# Patient Record
Sex: FEMALE | Race: BLACK OR AFRICAN AMERICAN | Employment: OTHER | URBAN - METROPOLITAN AREA
[De-identification: names, ages, dates, MRNs, and addresses within clinical notes are randomized per-mention and may not be internally consistent; named-entity substitution may affect disease eponyms.]

---

## 2017-02-14 ENCOUNTER — HOSPITAL ENCOUNTER (OUTPATIENT)
Dept: ULTRASOUND IMAGING | Age: 62
Discharge: HOME | End: 2017-02-14
Attending: NURSE PRACTITIONER
Payer: COMMERCIAL

## 2017-02-14 DIAGNOSIS — M79.606 PAIN AND SWELLING OF LOWER EXTREMITY: ICD-10-CM

## 2017-02-14 DIAGNOSIS — M79.89 PAIN AND SWELLING OF LOWER EXTREMITY: ICD-10-CM

## 2017-02-14 PROCEDURE — 93970 EXTREMITY STUDY: CPT

## 2017-08-23 PROBLEM — Z13.1 SCREENING FOR DIABETES MELLITUS (DM): Status: ACTIVE | Noted: 2017-08-23

## 2017-08-23 PROBLEM — E87.6 HYPOKALEMIA: Status: ACTIVE | Noted: 2017-08-23

## 2017-08-23 PROBLEM — E55.9 VITAMIN D DEFICIENCY: Status: ACTIVE | Noted: 2017-08-23

## 2017-08-23 PROBLEM — I10 HTN (HYPERTENSION): Status: ACTIVE | Noted: 2017-08-23

## 2017-08-23 PROBLEM — E04.1 LEFT THYROID NODULE: Status: ACTIVE | Noted: 2017-08-23

## 2017-08-23 PROBLEM — Z13.29 SCREENING FOR HYPOTHYROIDISM: Status: ACTIVE | Noted: 2017-08-23

## 2017-08-23 PROBLEM — Z00.00 ANNUAL PHYSICAL EXAM: Status: ACTIVE | Noted: 2017-08-23

## 2017-08-23 PROBLEM — E66.01 MORBID OBESITY (HCC): Status: ACTIVE | Noted: 2017-08-23

## 2017-08-23 PROBLEM — Z71.3 DIETARY COUNSELING: Status: ACTIVE | Noted: 2017-08-23

## 2017-08-23 PROBLEM — R10.30 LOWER ABDOMINAL PAIN: Status: ACTIVE | Noted: 2017-08-23

## 2017-08-23 PROBLEM — R21 RASH: Status: ACTIVE | Noted: 2017-08-23

## 2017-08-23 PROBLEM — M17.9 DJD (DEGENERATIVE JOINT DISEASE) OF KNEE: Status: ACTIVE | Noted: 2017-08-23

## 2017-08-23 PROBLEM — M51.9 LUMBAR DISC DISEASE: Status: ACTIVE | Noted: 2017-08-23

## 2017-08-23 PROBLEM — J30.9 RHINITIS, ALLERGIC: Status: ACTIVE | Noted: 2017-08-23

## 2017-08-23 PROBLEM — B35.4 TINEA CORPORIS: Status: ACTIVE | Noted: 2017-08-23

## 2017-08-23 PROBLEM — M79.89 SWELLING OF BOTH LOWER EXTREMITIES: Status: ACTIVE | Noted: 2017-08-23

## 2017-08-23 PROBLEM — Z78.0 MENOPAUSE: Status: ACTIVE | Noted: 2017-08-23

## 2017-08-23 PROBLEM — E66.9 OBESITY (BMI 30-39.9): Status: ACTIVE | Noted: 2017-08-23

## 2017-08-23 PROBLEM — L25.0 CONTACT DERMATITIS DUE TO COSMETICS: Status: ACTIVE | Noted: 2017-08-23

## 2017-08-23 PROBLEM — R10.9 ABDOMINAL PAIN: Status: ACTIVE | Noted: 2017-08-23

## 2017-08-23 PROBLEM — E78.5 HYPERLIPIDEMIA: Status: ACTIVE | Noted: 2017-08-23

## 2017-08-23 PROBLEM — K21.9 GASTROESOPHAGEAL REFLUX DISEASE WITH HIATAL HERNIA: Status: ACTIVE | Noted: 2017-08-23

## 2017-08-23 PROBLEM — K80.20 GALLSTONES: Status: ACTIVE | Noted: 2017-08-23

## 2017-08-23 PROBLEM — Z92.89 HISTORY OF TRANSFUSION: Status: ACTIVE | Noted: 2017-08-23

## 2017-08-23 PROBLEM — M19.90 DEGENERATIVE JOINT DISEASE: Status: ACTIVE | Noted: 2017-08-23

## 2017-08-23 PROBLEM — R05.9 COUGH: Status: ACTIVE | Noted: 2017-08-23

## 2017-08-23 PROBLEM — R51.9 HEADACHE: Status: ACTIVE | Noted: 2017-08-23

## 2017-08-23 PROBLEM — R32 URINARY INCONTINENCE: Status: ACTIVE | Noted: 2017-08-23

## 2017-08-23 PROBLEM — M79.89 LEG SWELLING: Status: ACTIVE | Noted: 2017-08-23

## 2017-08-23 PROBLEM — K44.9 GASTROESOPHAGEAL REFLUX DISEASE WITH HIATAL HERNIA: Status: ACTIVE | Noted: 2017-08-23

## 2017-08-23 PROBLEM — M25.551 HIP PAIN, RIGHT: Status: ACTIVE | Noted: 2017-08-23

## 2017-08-23 RX ORDER — TOLTERODINE 4 MG/1
4 CAPSULE, EXTENDED RELEASE ORAL DAILY
COMMUNITY
End: 2018-10-22 | Stop reason: ALTCHOICE

## 2017-08-23 RX ORDER — HYDROXYZINE PAMOATE 25 MG/1
25 CAPSULE ORAL
COMMUNITY
End: 2018-02-08

## 2017-08-23 RX ORDER — CLOTRIMAZOLE AND BETAMETHASONE DIPROPIONATE 10; .64 MG/G; MG/G
CREAM TOPICAL
COMMUNITY
End: 2018-02-08

## 2017-08-23 RX ORDER — RABEPRAZOLE SODIUM 20 MG/1
20 TABLET, DELAYED RELEASE ORAL DAILY
COMMUNITY
End: 2017-12-28

## 2017-09-27 ENCOUNTER — OFFICE VISIT (OUTPATIENT)
Dept: INTERNAL MEDICINE CLINIC | Age: 62
End: 2017-09-27

## 2017-09-27 VITALS
HEIGHT: 66 IN | TEMPERATURE: 99 F | DIASTOLIC BLOOD PRESSURE: 89 MMHG | WEIGHT: 293 LBS | OXYGEN SATURATION: 98 % | SYSTOLIC BLOOD PRESSURE: 136 MMHG | HEART RATE: 82 BPM | BODY MASS INDEX: 47.09 KG/M2

## 2017-09-27 DIAGNOSIS — E66.01 MORBID OBESITY DUE TO EXCESS CALORIES (HCC): ICD-10-CM

## 2017-09-27 DIAGNOSIS — I10 ESSENTIAL HYPERTENSION: Primary | ICD-10-CM

## 2017-09-27 DIAGNOSIS — K80.20 GALLSTONES: ICD-10-CM

## 2017-09-27 DIAGNOSIS — K44.9 GASTROESOPHAGEAL REFLUX DISEASE WITH HIATAL HERNIA: ICD-10-CM

## 2017-09-27 DIAGNOSIS — K21.9 GASTROESOPHAGEAL REFLUX DISEASE WITH HIATAL HERNIA: ICD-10-CM

## 2017-09-27 DIAGNOSIS — E78.2 MIXED HYPERLIPIDEMIA: ICD-10-CM

## 2017-09-27 RX ORDER — FLUCONAZOLE 150 MG/1
150 TABLET ORAL
COMMUNITY
End: 2018-02-08

## 2017-09-27 NOTE — PROGRESS NOTES
Subjective:  Ms. Ryanne Laws is a pleasant 60year old lady, who comes in today for follow up of her medical problems. She has no new complaints. 1. Hypertension, currently managed on Lisinopril/HCTZ 12/12.5 mg daily. She denies any headaches, dizziness or blurred vision. She denies any chest pain or palpitation. She denies any shortness of breath, cough, wheezing, PND or orthopnea. She does have chronic ankle edema. 2. GERD, managed on Nexium 40 mg daily. She denies any recent nausea or vomiting and her appetite remains good. Her weight has persistently gone up in the last year. She did have a normal colonoscopy in 2014. 3. Hyperlipidemia, managed on Simvastatin. She denies any joint pain or muscle ache. 4. Urinary incontinence, for which she sees Dr. Ananda William at [facility]. Dr. Ananda William added some Myrbetriq, which she is taking along with her Detrol LA. It has helped minimally. She is to follow up with Dr. Ananda William in one month. 5. Cholelithiasis. Further discussion with her revealed she has no recollection that she saw Dr. Caro Iverson in May of 2016, at which time Dr. Becki Blake did discuss doing a DaVinci cholecystectomy. I printed Dr. Anne Heart note and went over the visit with her. She has been asymptomatic. Physical Examination:  GENERAL:  On exam she is a pleasant, markedly obese lady in no acute distress. She is alert and oriented. VITALS:  BP: 136/89. P: 82.  R: 16.  T: 99.  O2 sat: 98.  WT: currently 392 lbs. She is 5'6\". NECK:  Supple without adenopathy or carotid bruits. CHEST:  Lungs were clear to auscultation, no rales or wheezes. CARDIAC:  Heart regular rhythm without murmur. EXTREMITIES:  Trace pitting edema in both ankles. Impression:  1. Hypertension, stable. 2. Hyperlipidemia. 3. Morbid obesity. 4. GERD. 5. Urinary incontinence. 6. Cholelithiasis. Plan:  1. She will continue with her current regimen and I will see her every three months.   I reviewed her lab work done at her last visit, which was entirely normal.  2. I did encourage her to follow up with Dr. Ash Rodriguez in regards to cholelithiasis.

## 2017-09-27 NOTE — PROGRESS NOTES
Robert Edwards presents with   Chief Complaint   Patient presents with    Follow-up     Patient here for a 3 month followup. She is fasting. No new complaints. 1. Have you been to the ER, urgent care clinic since your last visit? Hospitalized since your last visit? No    2. Have you seen or consulted any other health care providers outside of the [address] [place] [address] since your last visit? Include any pap smears or colon screening.  No

## 2017-09-27 NOTE — MR AVS SNAPSHOT
Visit Information Date & Time Provider Department Dept. Phone Encounter #  
 9/27/2017 10:30 AM Catrachito Triplett NP [facility] [phone] [id number] Follow-up Instructions Return in about 3 months (around 12/27/2017). Upcoming Health Maintenance Date Due Hepatitis C Screening 1955 DTaP/Tdap/Td series (1 - Tdap) 3/14/1975 PAP AKA CERVICAL CYTOLOGY 3/14/1975 FOBT Q 1 YEAR AGE 50-75 3/14/2004 ZOSTER VACCINE AGE 60> 1/14/2014 BREAST CANCER SCRN MAMMOGRAM 5/22/2015 INFLUENZA AGE 9 TO ADULT 8/1/2017 Allergies as of 9/27/2017  Review Complete On: 9/27/2017 By: Ana Parrish Severity Noted Reaction Type Reactions Adhesive Tape-silicones  59/78/6325    Unknown (comments) Current Immunizations  Never Reviewed Name Date Influenza Vaccine 11/14/2016 Influenza Vaccine Split 10/31/2011 11:27 AM  
  
 Not reviewed this visit You Were Diagnosed With   
  
 Codes Comments Essential hypertension    -  Primary ICD-10-CM: I10 
ICD-9-CM: 401.9 Mixed hyperlipidemia     ICD-10-CM: E78.2 ICD-9-CM: 272.2 Morbid obesity due to excess calories (HCC)     ICD-10-CM: E66.01 
ICD-9-CM: 278.01 Gallstones     ICD-10-CM: K80.20 ICD-9-CM: 574.20 Gastroesophageal reflux disease with hiatal hernia     ICD-10-CM: K21.9, K44.9 ICD-9-CM: 530.81, 553.3 Vitals BP Pulse Temp Height(growth percentile) Weight(growth percentile) SpO2  
 136/89 (BP 1 Location: Left arm, BP Patient Position: Sitting) 82 99 °F (37.2 °C) (Oral) 5' 6\" (1.676 m) (!) 392 lb (177.8 kg) 98% BMI OB Status Smoking Status 63.27 kg/m2 Postmenopausal Never Smoker BMI and BSA Data Body Mass Index Body Surface Area  
 63.27 kg/m 2 2.88 m 2 Preferred Pharmacy Pharmacy Name Phone [id number] [address] [place] [phone] Your Updated Medication List  
  
   
This list is accurate as of: 9/27/17 11:25 AM.  Always use your most recent med list.  
  
  
  
  
 BIOTIN PO Take  by mouth. diclofenac EC 75 mg EC tablet Commonly known as:  VOLTAREN Take 75 mg by mouth three (3) times daily. FISH OIL 1,000 mg Cap Generic drug:  omega-3 fatty acids-vitamin e Take 1 Cap by mouth daily. fluconazole 150 mg tablet Commonly known as:  DIFLUCAN Take 150 mg by mouth every thirty (30) days. FDA advises cautious prescribing of oral fluconazole in pregnancy. hydroCHLOROthiazide 12.5 mg capsule Commonly known as:  Salazar Priscila Take 12.5 mg by mouth daily. lisinopril 10 mg tablet Commonly known as:  Hepler Faustino Take 20 mg by mouth daily. LOTRISONE topical cream  
Generic drug:  clotrimazole-betamethasone Apply  to affected area two (2) times a day. MYRBETRIQ 25 mg ER tablet Generic drug:  mirabegron ER Take 25 mg by mouth daily. NexIUM 40 mg capsule Generic drug:  esomeprazole Take  by mouth daily. potassium chloride SA 10 mEq capsule Commonly known as:  Valentino China Take 20 mEq by mouth daily. RABEprazole 20 mg tablet Commonly known as:  ACIPHEX Take 20 mg by mouth daily. simvastatin 20 mg tablet Commonly known as:  ZOCOR Take  by mouth nightly. solifenacin 5 mg tablet Commonly known as:  Leta [place] Take 5 mg by mouth daily. tolterodine ER 4 mg ER capsule Commonly known as:  Emory Jimenez Take 4 mg by mouth daily. traMADol 50 mg tablet Commonly known as:  ULTRAM  
Take 1 Tab by mouth every six (6) hours as needed for Pain. Max Daily Amount: 200 mg. VISTARIL 25 mg capsule Generic drug:  hydrOXYzine pamoate Take 25 mg by mouth three (3) times daily as needed for Itching. VITAMIN D2 PO Take  by mouth. Follow-up Instructions Return in about 3 months (around 12/27/2017). Patient Instructions DASH Diet: Care Instructions Your Care Instructions The DASH diet is an eating plan that can help lower your blood pressure. DASH stands for Dietary Approaches to Stop Hypertension. Hypertension is high blood pressure. The DASH diet focuses on eating foods that are high in calcium, potassium, and magnesium. These nutrients can lower blood pressure. The foods that are highest in these nutrients are fruits, vegetables, low-fat dairy products, nuts, seeds, and legumes. But taking calcium, potassium, and magnesium supplements instead of eating foods that are high in those nutrients does not have the same effect. The DASH diet also includes whole grains, fish, and poultry. The DASH diet is one of several lifestyle changes your doctor may recommend to lower your high blood pressure. Your doctor may also want you to decrease the amount of sodium in your diet. Lowering sodium while following the DASH diet can lower blood pressure even further than just the DASH diet alone. Follow-up care is a key part of your treatment and safety. Be sure to make and go to all appointments, and call your doctor if you are having problems. It's also a good idea to know your test results and keep a list of the medicines you take. How can you care for yourself at home? Following the DASH diet · Eat 4 to 5 servings of fruit each day. A serving is 1 medium-sized piece of fruit, ½ cup chopped or canned fruit, 1/4 cup dried fruit, or 4 ounces (½ cup) of fruit juice. Choose fruit more often than fruit juice. · Eat 4 to 5 servings of vegetables each day. A serving is 1 cup of lettuce or raw leafy vegetables, ½ cup of chopped or cooked vegetables, or 4 ounces (½ cup) of vegetable juice. Choose vegetables more often than vegetable juice. · Get 2 to 3 servings of low-fat and fat-free dairy each day. A serving is 8 ounces of milk, 1 cup of yogurt, or 1 ½ ounces of cheese. · Eat 6 to 8 servings of grains each day. A serving is 1 slice of bread, 1 ounce of dry cereal, or ½ cup of cooked rice, pasta, or cooked cereal. Try to choose whole-grain products as much as possible. · Limit lean meat, poultry, and fish to 2 servings each day. A serving is 3 ounces, about the size of a deck of cards. · Eat 4 to 5 servings of nuts, seeds, and legumes (cooked dried beans, lentils, and split peas) each week. A serving is 1/3 cup of nuts, 2 tablespoons of seeds, or ½ cup of cooked beans or peas. · Limit fats and oils to 2 to 3 servings each day. A serving is 1 teaspoon of vegetable oil or 2 tablespoons of salad dressing. · Limit sweets and added sugars to 5 servings or less a week. A serving is 1 tablespoon jelly or jam, ½ cup sorbet, or 1 cup of lemonade. · Eat less than 2,300 milligrams (mg) of sodium a day. If you limit your sodium to 1,500 mg a day, you can lower your blood pressure even more. Tips for success · Start small. Do not try to make dramatic changes to your diet all at once. You might feel that you are missing out on your favorite foods and then be more likely to not follow the plan. Make small changes, and stick with them. Once those changes become habit, add a few more changes. · Try some of the following: ¨ Make it a goal to eat a fruit or vegetable at every meal and at snacks. This will make it easy to get the recommended amount of fruits and vegetables each day. ¨ Try yogurt topped with fruit and nuts for a snack or healthy dessert. ¨ Add lettuce, tomato, cucumber, and onion to sandwiches. ¨ Combine a ready-made pizza crust with low-fat mozzarella cheese and lots of vegetable toppings. Try using tomatoes, squash, spinach, broccoli, carrots, cauliflower, and onions. ¨ Have a variety of cut-up vegetables with a low-fat dip as an appetizer instead of chips and dip. ¨ Sprinkle sunflower seeds or chopped almonds over salads.  Or try adding chopped walnuts or almonds to cooked vegetables. ¨ Try some vegetarian meals using beans and peas. Add garbanzo or kidney beans to salads. Make burritos and tacos with mashed santillan beans or black beans. Where can you learn more? Go to http://jean pierre-shanae.info/. Enter T406 in the search box to learn more about \"DASH Diet: Care Instructions. \" Current as of: April 3, 2017 Content Version: 11.3 © 5277-8002 [employer]. Care instructions adapted under license by [employer] (which disclaims liability or warranty for this information). If you have questions about a medical condition or this instruction, always ask your healthcare professional. [address] any warranty or liability for your use of this information. Introducing [facility]! Brandyn Garcia introduces [employer] patient portal. Now you can access parts of your medical record, email your doctor's office, and request medication refills online. 1. In your internet browser, go to [email or web address] 2. Click on the First Time User? Click Here link in the Sign In box. You will see the New Member Sign Up page. 3. Enter your [employer] Access Code exactly as it appears below. You will not need to use this code after youve completed the sign-up process. If you do not sign up before the expiration date, you must request a new code. · [employer] Access Code: [email or web address] Expires: 11/18/2017  9:14 AM 
 
4. Enter the last four digits of your Social Security Number (xxxx) and Date of Birth (mm/dd/yyyy) as indicated and click Submit. You will be taken to the next sign-up page. 5. Create a [employer] ID. This will be your [employer] login ID and cannot be changed, so think of one that is secure and easy to remember. 6. Create a [employer] password. You can change your password at any time. 7. Enter your Password Reset Question and Answer.  This can be used at a later time if you forget your password. 8. Enter your e-mail address. You will receive e-mail notification when new information is available in [address] 9. Click Sign Up. You can now view and download portions of your medical record. 10. Click the Download Summary menu link to download a portable copy of your medical information. If you have questions, please visit the Frequently Asked Questions section of the [employer] website. Remember, [employer] is NOT to be used for urgent needs. For medical emergencies, dial 911. Now available from your iPhone and Android! Please provide this summary of care documentation to your next provider. Your primary care clinician is listed as Bisi Caruso. If you have any questions after today's visit, please call [phone].

## 2017-09-27 NOTE — PATIENT INSTRUCTIONS

## 2017-12-28 ENCOUNTER — OFFICE VISIT (OUTPATIENT)
Dept: INTERNAL MEDICINE CLINIC | Age: 62
End: 2017-12-28

## 2017-12-28 ENCOUNTER — HOSPITAL ENCOUNTER (OUTPATIENT)
Dept: CT IMAGING | Age: 62
Discharge: HOME | End: 2017-12-28
Attending: NURSE PRACTITIONER
Payer: OTHER GOVERNMENT

## 2017-12-28 VITALS
WEIGHT: 293 LBS | DIASTOLIC BLOOD PRESSURE: 86 MMHG | SYSTOLIC BLOOD PRESSURE: 130 MMHG | OXYGEN SATURATION: 99 % | BODY MASS INDEX: 47.09 KG/M2 | HEART RATE: 90 BPM | HEIGHT: 66 IN

## 2017-12-28 DIAGNOSIS — Z11.59 NEED FOR HEPATITIS C SCREENING TEST: ICD-10-CM

## 2017-12-28 DIAGNOSIS — R10.31 RIGHT LOWER QUADRANT ABDOMINAL PAIN: ICD-10-CM

## 2017-12-28 DIAGNOSIS — I10 ESSENTIAL HYPERTENSION: Primary | ICD-10-CM

## 2017-12-28 DIAGNOSIS — E66.01 OBESITY, CLASS III, BMI 40-49.9 (MORBID OBESITY) (HCC): ICD-10-CM

## 2017-12-28 DIAGNOSIS — Z23 ENCOUNTER FOR IMMUNIZATION: ICD-10-CM

## 2017-12-28 DIAGNOSIS — R10.31 RIGHT LOWER QUADRANT ABDOMINAL PAIN: Primary | ICD-10-CM

## 2017-12-28 LAB
ALBUMIN SERPL-MCNC: 4.1 G/DL (ref 3.9–5.4)
ALKALINE PHOS POC: 94 U/L (ref 38–126)
ALT SERPL-CCNC: 18 U/L (ref 9–52)
AST SERPL-CCNC: 14 U/L (ref 14–36)
BACTERIA UA POCT, BACTPOCT: NORMAL
BILIRUB UR QL STRIP: NEGATIVE
BUN BLD-MCNC: 14 MG/DL (ref 7–17)
CALCIUM BLD-MCNC: 9.6 MG/DL (ref 8.4–10.2)
CASTS UA POCT: NORMAL
CHLORIDE BLD-SCNC: 103 MMOL/L (ref 98–107)
CLUE CELLS, CLUEPOCT: NEGATIVE
CO2 POC: 30 MMOL/L (ref 22–32)
CREAT BLD-MCNC: 0.6 MG/DL (ref 0.6–1.3)
CREAT BLD-MCNC: 0.6 MG/DL (ref 0.7–1.2)
CRYSTALS UA POCT, CRYSPOCT: NEGATIVE
EGFR (POC): 97
EPITHELIAL CELLS POCT: NORMAL
GLUCOSE POC: 87 MG/DL (ref 65–105)
GLUCOSE UR-MCNC: NEGATIVE MG/DL
GRAN# POC: 3.4 K/UL (ref 2–7.8)
GRAN% POC: 71.7 % (ref 37–92)
HCT VFR BLD CALC: 34 % (ref 37–51)
HGB BLD-MCNC: 11.1 G/DL (ref 12–18)
KETONES P FAST UR STRIP-MCNC: NEGATIVE MG/DL
LY# POC: 1.1 K/UL (ref 0.6–4.1)
LY% POC: 24 % (ref 10–58.5)
MCH RBC QN: 27.9 PG (ref 26–32)
MCHC RBC-ENTMCNC: 32.7 G/DL (ref 30–36)
MCV RBC: 85 FL (ref 80–97)
MID #, POC: 0.1 K/UL (ref 0–1.8)
MID% POC: 4.3 % (ref 0.1–24)
MUCUS UA POCT, MUCPOCT: NORMAL
PH UR STRIP: 6 [PH] (ref 5–7)
PLATELET # BLD: 226 K/UL (ref 140–440)
POTASSIUM SERPL-SCNC: 4.1 MMOL/L (ref 3.6–5)
PROT SERPL-MCNC: 7.4 G/DL (ref 6.3–8.2)
PROT UR QL STRIP: NEGATIVE
RBC # BLD: 3.98 M/UL (ref 4.2–6.3)
RBC UA POCT, RBCPOCT: NORMAL
SODIUM SERPL-SCNC: 144 MMOL/L (ref 137–145)
SP GR UR STRIP: 1.02 (ref 1.01–1.02)
TOTAL BILIRUBIN POC: 0.8 MG/DL (ref 0.2–1.3)
TRICH UA POCT, TRICHPOC: NEGATIVE
UA UROBILINOGEN AMB POC: NORMAL (ref 0.2–1)
URINALYSIS CLARITY POC: CLEAR
URINALYSIS COLOR POC: NORMAL
URINE BLOOD POC: NEGATIVE
URINE CULT COMMENT, POCT: NORMAL
URINE LEUKOCYTES POC: NORMAL
URINE NITRITES POC: NEGATIVE
WBC # BLD: 4.6 K/UL (ref 4.1–10.9)
WBC UA POCT, WBCPOCT: NORMAL
YEAST UA POCT, YEASTPOC: NEGATIVE

## 2017-12-28 PROCEDURE — 74011250636 HC RX REV CODE- 250/636: Performed by: NURSE PRACTITIONER

## 2017-12-28 PROCEDURE — 74177 CT ABD & PELVIS W/CONTRAST: CPT

## 2017-12-28 PROCEDURE — 82565 ASSAY OF CREATININE: CPT

## 2017-12-28 PROCEDURE — 74011000255 HC RX REV CODE- 255: Performed by: NURSE PRACTITIONER

## 2017-12-28 PROCEDURE — 74011636320 HC RX REV CODE- 636/320: Performed by: NURSE PRACTITIONER

## 2017-12-28 RX ORDER — BARIUM SULFATE 20 MG/ML
900 SUSPENSION ORAL
Status: COMPLETED | OUTPATIENT
Start: 2017-12-28 | End: 2017-12-28

## 2017-12-28 RX ORDER — SODIUM CHLORIDE 0.9 % (FLUSH) 0.9 %
10 SYRINGE (ML) INJECTION
Status: COMPLETED | OUTPATIENT
Start: 2017-12-28 | End: 2017-12-28

## 2017-12-28 RX ORDER — SODIUM CHLORIDE 9 MG/ML
50 INJECTION, SOLUTION INTRAVENOUS
Status: COMPLETED | OUTPATIENT
Start: 2017-12-28 | End: 2017-12-28

## 2017-12-28 RX ADMIN — Medication 10 ML: at 15:07

## 2017-12-28 RX ADMIN — BARIUM SULFATE 900 ML: 21 SUSPENSION ORAL at 15:07

## 2017-12-28 RX ADMIN — SODIUM CHLORIDE 50 ML/HR: 900 INJECTION, SOLUTION INTRAVENOUS at 15:07

## 2017-12-28 RX ADMIN — IOPAMIDOL 100 ML: 755 INJECTION, SOLUTION INTRAVENOUS at 15:07

## 2017-12-28 NOTE — PATIENT INSTRUCTIONS
Abdominal Pain: Care Instructions  Your Care Instructions    Abdominal pain has many possible causes. Some aren't serious and get better on their own in a few days. Others need more testing and treatment. If your pain continues or gets worse, you need to be rechecked and may need more tests to find out what is wrong. You may need surgery to correct the problem. Don't ignore new symptoms, such as fever, nausea and vomiting, urination problems, pain that gets worse, and dizziness. These may be signs of a more serious problem. Your doctor may have recommended a follow-up visit in the next 8 to 12 hours. If you are not getting better, you may need more tests or treatment. The doctor has checked you carefully, but problems can develop later. If you notice any problems or new symptoms, get medical treatment right away. Follow-up care is a key part of your treatment and safety. Be sure to make and go to all appointments, and call your doctor if you are having problems. It's also a good idea to know your test results and keep a list of the medicines you take. How can you care for yourself at home? · Rest until you feel better. · To prevent dehydration, drink plenty of fluids, enough so that your urine is light yellow or clear like water. Choose water and other caffeine-free clear liquids until you feel better. If you have kidney, heart, or liver disease and have to limit fluids, talk with your doctor before you increase the amount of fluids you drink. · If your stomach is upset, eat mild foods, such as rice, dry toast or crackers, bananas, and applesauce. Try eating several small meals instead of two or three large ones. · Wait until 48 hours after all symptoms have gone away before you have spicy foods, alcohol, and drinks that contain caffeine. · Do not eat foods that are high in fat. · Avoid anti-inflammatory medicines such as aspirin, ibuprofen (Advil, Motrin), and naproxen (Aleve).  These can cause stomach upset. Talk to your doctor if you take daily aspirin for another health problem. When should you call for help? Call 911 anytime you think you may need emergency care. For example, call if:  ? · You passed out (lost consciousness). ? · You pass maroon or very bloody stools. ? · You vomit blood or what looks like coffee grounds. ? · You have new, severe belly pain. ?Call your doctor now or seek immediate medical care if:  ? · Your pain gets worse, especially if it becomes focused in one area of your belly. ? · You have a new or higher fever. ? · Your stools are black and look like tar, or they have streaks of blood. ? · You have unexpected vaginal bleeding. ? · You have symptoms of a urinary tract infection. These may include:  ¨ Pain when you urinate. ¨ Urinating more often than usual.  ¨ Blood in your urine. ? · You are dizzy or lightheaded, or you feel like you may faint. ? Watch closely for changes in your health, and be sure to contact your doctor if:  ? · You are not getting better after 1 day (24 hours). Where can you learn more? Go to http://jean pierre-shanae.info/. Enter B800 in the search box to learn more about \"Abdominal Pain: Care Instructions. \"  Current as of: March 20, 2017  Content Version: 11.4  © 0387-2628 [employer]. Care instructions adapted under license by [employer] (which disclaims liability or warranty for this information). If you have questions about a medical condition or this instruction, always ask your healthcare professional. [place] [address] any warranty or liability for your use of this information.

## 2017-12-28 NOTE — MR AVS SNAPSHOT
Visit Information Date & Time Provider Department Dept. Phone Encounter #  
 12/28/2017 10:00 AM Berkley Vidal NP [address] [facility] [phone] [id number] Follow-up Instructions Return in about 6 months (around 6/28/2018). Upcoming Health Maintenance Date Due Hepatitis C Screening 1955 DTaP/Tdap/Td series (1 - Tdap) 3/14/1975 PAP AKA CERVICAL CYTOLOGY 3/14/1975 FOBT Q 1 YEAR AGE 50-75 3/14/2004 ZOSTER VACCINE AGE 60> 1/14/2014 Influenza Age 5 to Adult 8/1/2017 Allergies as of 12/28/2017  Review Complete On: 12/28/2017 By: Adelaida Almeida Severity Noted Reaction Type Reactions Adhesive Tape-silicones  13/11/3749    Unknown (comments) Current Immunizations  Never Reviewed Name Date Influenza Vaccine 11/14/2016 Influenza Vaccine Split 10/31/2011 11:27 AM  
  
 Not reviewed this visit You Were Diagnosed With   
  
 Codes Comments Essential hypertension    -  Primary ICD-10-CM: I10 
ICD-9-CM: 401.9 Obesity, Class III, BMI 40-49.9 (morbid obesity) (HCC)     ICD-10-CM: E66.01 
ICD-9-CM: 278.01 Right lower quadrant abdominal pain     ICD-10-CM: R10.31 ICD-9-CM: 789.03 Need for hepatitis C screening test     ICD-10-CM: Z11.59 
ICD-9-CM: V73.89 Vitals BP Pulse Height(growth percentile) Weight(growth percentile) SpO2 BMI  
 141/74 (BP 1 Location: Left arm, BP Patient Position: Sitting) 90 5' 6\" (1.676 m) (!) 392 lb (177.8 kg) 99% 63.27 kg/m2 OB Status Smoking Status Postmenopausal Never Smoker BMI and BSA Data Body Mass Index Body Surface Area  
 63.27 kg/m 2 2.88 m 2 Preferred Pharmacy Pharmacy Name Phone [facility] [address], [facility] [address] [phone] Your Updated Medication List  
  
   
This list is accurate as of: 12/28/17 10:32 AM.  Always use your most recent med list.  
  
  
  
  
 BIOTIN PO Take  by mouth. diclofenac EC 75 mg EC tablet Commonly known as:  VOLTAREN Take 75 mg by mouth three (3) times daily. FISH OIL 1,000 mg Cap Generic drug:  omega-3 fatty acids-vitamin e Take 1 Cap by mouth daily. fluconazole 150 mg tablet Commonly known as:  DIFLUCAN Take 150 mg by mouth every thirty (30) days. FDA advises cautious prescribing of oral fluconazole in pregnancy. hydroCHLOROthiazide 12.5 mg capsule Commonly known as:  Gale Casa Take 12.5 mg by mouth daily. lisinopril 10 mg tablet Commonly known as:  Onur Shutters Take 20 mg by mouth daily. LOTRISONE topical cream  
Generic drug:  clotrimazole-betamethasone Apply  to affected area two (2) times a day. MYRBETRIQ 25 mg ER tablet Generic drug:  mirabegron ER Take 25 mg by mouth daily. NexIUM 40 mg capsule Generic drug:  esomeprazole Take  by mouth daily. potassium chloride SA 10 mEq capsule Commonly known as:  Tye [place] Take 20 mEq by mouth daily. simvastatin 20 mg tablet Commonly known as:  ZOCOR Take  by mouth nightly. tolterodine ER 4 mg ER capsule Commonly known as:  Daniela Putty Take 4 mg by mouth daily. traMADol 50 mg tablet Commonly known as:  ULTRAM  
Take 1 Tab by mouth every six (6) hours as needed for Pain. Max Daily Amount: 200 mg. VISTARIL 25 mg capsule Generic drug:  hydrOXYzine pamoate Take 25 mg by mouth three (3) times daily as needed for Itching. VITAMIN D2 PO Take  by mouth. We Performed the Following AMB POC BASIC METABOLIC PANEL [98266 CPT(R)] AMB POC COMPREHENSIVE METABOLIC PANEL [53341 CPT(R)] AMB POC URINALYSIS DIP STICK AUTO W/ MICRO  [24454 CPT(R)] HEPATITIS C AB [74178 CPT(R)] Follow-up Instructions Return in about 6 months (around 6/28/2018). Patient Instructions Abdominal Pain: Care Instructions Your Care Instructions Abdominal pain has many possible causes. Some aren't serious and get better on their own in a few days. Others need more testing and treatment. If your pain continues or gets worse, you need to be rechecked and may need more tests to find out what is wrong. You may need surgery to correct the problem. Don't ignore new symptoms, such as fever, nausea and vomiting, urination problems, pain that gets worse, and dizziness. These may be signs of a more serious problem. Your doctor may have recommended a follow-up visit in the next 8 to 12 hours. If you are not getting better, you may need more tests or treatment. The doctor has checked you carefully, but problems can develop later. If you notice any problems or new symptoms, get medical treatment right away. Follow-up care is a key part of your treatment and safety. Be sure to make and go to all appointments, and call your doctor if you are having problems. It's also a good idea to know your test results and keep a list of the medicines you take. How can you care for yourself at home? · Rest until you feel better. · To prevent dehydration, drink plenty of fluids, enough so that your urine is light yellow or clear like water. Choose water and other caffeine-free clear liquids until you feel better. If you have kidney, heart, or liver disease and have to limit fluids, talk with your doctor before you increase the amount of fluids you drink. · If your stomach is upset, eat mild foods, such as rice, dry toast or crackers, bananas, and applesauce. Try eating several small meals instead of two or three large ones. · Wait until 48 hours after all symptoms have gone away before you have spicy foods, alcohol, and drinks that contain caffeine. · Do not eat foods that are high in fat. · Avoid anti-inflammatory medicines such as aspirin, ibuprofen (Advil, Motrin), and naproxen (Aleve). These can cause stomach upset.  Talk to your doctor if you take daily aspirin for another health problem. When should you call for help? Call 911 anytime you think you may need emergency care. For example, call if: 
? · You passed out (lost consciousness). ? · You pass maroon or very bloody stools. ? · You vomit blood or what looks like coffee grounds. ? · You have new, severe belly pain. ?Call your doctor now or seek immediate medical care if: 
? · Your pain gets worse, especially if it becomes focused in one area of your belly. ? · You have a new or higher fever. ? · Your stools are black and look like tar, or they have streaks of blood. ? · You have unexpected vaginal bleeding. ? · You have symptoms of a urinary tract infection. These may include: 
¨ Pain when you urinate. ¨ Urinating more often than usual. 
¨ Blood in your urine. ? · You are dizzy or lightheaded, or you feel like you may faint. ? Watch closely for changes in your health, and be sure to contact your doctor if: 
? · You are not getting better after 1 day (24 hours). Where can you learn more? Go to http://jean pierre-shanae.info/. Enter J922 in the search box to learn more about \"Abdominal Pain: Care Instructions. \" Current as of: March 20, 2017 Content Version: 11.4 © 0861-3665 Healthwise, Incorporated. Care instructions adapted under license by [employer] (which disclaims liability or warranty for this information). If you have questions about a medical condition or this instruction, always ask your healthcare professional. [place] [address] any warranty or liability for your use of this information. Introducing [facility] & HEALTH SERVICES! [facility] introduces Tello patient portal. Now you can access parts of your medical record, email your doctor's office, and request medication refills online. 1. In your internet browser, go to [email or web address] 2. Click on the First Time User? Click Here link in the Sign In box. You will see the New Member Sign Up page. 3. Enter your RACTIV Access Code exactly as it appears below. You will not need to use this code after youve completed the sign-up process. If you do not sign up before the expiration date, you must request a new code. · RACTIV Access Code: [email or web address] Expires: 3/28/2018  9:49 AM 
 
4. Enter the last four digits of your Social Security Number (xxxx) and Date of Birth (mm/dd/yyyy) as indicated and click Submit. You will be taken to the next sign-up page. 5. Create a RACTIV ID. This will be your RACTIV login ID and cannot be changed, so think of one that is secure and easy to remember. 6. Create a RACTIV password. You can change your password at any time. 7. Enter your Password Reset Question and Answer. This can be used at a later time if you forget your password. 8. Enter your e-mail address. You will receive e-mail notification when new information is available in [address] 9. Click Sign Up. You can now view and download portions of your medical record. 10. Click the Download Summary menu link to download a portable copy of your medical information. If you have questions, please visit the Frequently Asked Questions section of the RACTIV website. Remember, RACTIV is NOT to be used for urgent needs. For medical emergencies, dial 911. Now available from your iPhone and Android! Please provide this summary of care documentation to your next provider. Your primary care clinician is listed as Walter Jiménez. If you have any questions after today's visit, please call [phone].

## 2017-12-28 NOTE — PROGRESS NOTES
Subjective:  Ms. Gilda Ramirez is a pleasant 60year old lady, who comes in today for follow up of her medical problems. Her complaint today is a 2-3 month history of intermittent right lower quadrant pain. She gets this discomfort approximately once a week. It is nagging in nature. At times the pain is severe enough to make her bend over. At times she's had some nausea, but denies any vomiting. She saw her gynecologist and urologist about a week ago, at which time she had a pelvic sonogram and she was told that there were no abnormalities. She has a history of cholelithiasis, for which she was evaluated by Dr. Bashir Cruz in May of 2016. She has never returned for follow up. She does have a normal bowel pattern without presence of blood in her stools or melena. She did have a colonoscopy four years ago that apparently was normal.  Her appetite remains unchanged. She tells me that she had a hernia previously and her pain is very similar. 1. Hypertension, currently managed on Lisinopril/HCTZ 12/12.5 mg daily. She denies any headaches, dizziness or blurred vision. She denies any chest pain or palpitations. She denies any shortness of breath, cough, wheezing, PND or orthopnea. She does have chronic ankle edema. 2. GERD, managed on Nexium with no new complaints. Her weight, however, has persistently gone up in the last year. 3. Hyperlipidemia, managed on Simvastatin. She denies any joint pain or muscle ache. 4. Urinary incontinence, under the care of Dr. Hua Sheppard from [facility]. She is now currently on Myrbetriq and Detrol LA. 5. Cholelithiasis as noted previously. 6. She is requesting her flu vaccine. Physical Examination:  GENERAL:  On exam she is a pleasant lady in no acute distress. She is markedly obese. She is unable to get on the examining table. She ambulates with a cane. She is alert and oriented. VITALS:  BP: 130/86. P: 90 and regular.   O2 sat: 99.  We could not get her weight since she is apparently over 400. HT: 5'6\". NECK:  Supple without adenopathy or carotid bruits. CHEST:  Lungs were clear to auscultation, no rales or wheezes. CARDIAC:  Heart regular rhythm without murmur. ABDOMEN:  I could not get her on the examining table. Because of her weight it would be impossible to palpate any masses. EXTREMITIES:  Trace pitting edema in both ankles. Distal pulses were present. Impression:  1. Right lower quadrant abdominal pain. 2. Hypertension. 3. Hyperlipidemia. 4. Morbid obesity. 5. GERD. 6. Urinary incontinence. 7. Cholelithiasis. Plan:  1. It was opted to schedule her for a CT of the abdomen. If her CT is normal then I think we should get a repeat colonoscopy. Further plans will be established pending the results of the CT scan. 2. I did get a CBC this morning, comprehensive metabolic and UA. I will call her with these results. 3. I did give her a flu vaccine in her left deltoid, which she tolerated well. 4. In the meantime she will continue with her current regimen and I will see her every three months.

## 2017-12-28 NOTE — PROGRESS NOTES
Ruby Donis presents with   Chief Complaint   Patient presents with    Follow-up   Patient here for a 3 month followup. She is fasting. Wants flu shot. 1. Have you been to the ER, urgent care clinic since your last visit? Hospitalized since your last visit? No    2. Have you seen or consulted any other health care providers outside of the [address] [place] [address] since your last visit? Include any pap smears or colon screening.  No

## 2017-12-29 LAB — HCV AB S/CO SERPL IA: 0.2 S/CO RATIO (ref 0–0.9)

## 2018-01-02 DIAGNOSIS — R10.31 RIGHT LOWER QUADRANT ABDOMINAL PAIN: Primary | ICD-10-CM

## 2018-02-08 RX ORDER — ELECTROLYTES/DEXTROSE
10 SOLUTION, ORAL ORAL
COMMUNITY

## 2018-02-08 RX ORDER — CLOTRIMAZOLE AND BETAMETHASONE DIPROPIONATE 10; .64 MG/G; MG/G
CREAM TOPICAL 2 TIMES DAILY
COMMUNITY

## 2018-02-08 RX ORDER — LISINOPRIL AND HYDROCHLOROTHIAZIDE 10; 12.5 MG/1; MG/1
1 TABLET ORAL DAILY
COMMUNITY
End: 2018-05-30 | Stop reason: SDUPTHER

## 2018-02-09 ENCOUNTER — ANESTHESIA EVENT (OUTPATIENT)
Dept: ENDOSCOPY | Age: 63
End: 2018-02-09
Payer: OTHER GOVERNMENT

## 2018-02-09 ENCOUNTER — HOSPITAL ENCOUNTER (OUTPATIENT)
Age: 63
Setting detail: OUTPATIENT SURGERY
Discharge: HOME | End: 2018-02-09
Attending: INTERNAL MEDICINE | Admitting: INTERNAL MEDICINE
Payer: OTHER GOVERNMENT

## 2018-02-09 ENCOUNTER — ANESTHESIA (OUTPATIENT)
Dept: ENDOSCOPY | Age: 63
End: 2018-02-09
Payer: OTHER GOVERNMENT

## 2018-02-09 VITALS
HEIGHT: 66 IN | SYSTOLIC BLOOD PRESSURE: 146 MMHG | OXYGEN SATURATION: 97 % | BODY MASS INDEX: 47.09 KG/M2 | DIASTOLIC BLOOD PRESSURE: 64 MMHG | RESPIRATION RATE: 20 BRPM | TEMPERATURE: 97.6 F | WEIGHT: 293 LBS | HEART RATE: 84 BPM

## 2018-02-09 LAB
H PYLORI FROM TISSUE: NEGATIVE
KIT LOT NO., HCLOLOT: NORMAL
NEGATIVE CONTROL: NEGATIVE
POSITIVE CONTROL: POSITIVE

## 2018-02-09 PROCEDURE — 76040000007: Performed by: INTERNAL MEDICINE

## 2018-02-09 PROCEDURE — 77030019988 HC FCPS ENDOSC DISP BSC -B: Performed by: INTERNAL MEDICINE

## 2018-02-09 PROCEDURE — 76060000032 HC ANESTHESIA 0.5 TO 1 HR: Performed by: INTERNAL MEDICINE

## 2018-02-09 PROCEDURE — 74011250636 HC RX REV CODE- 250/636

## 2018-02-09 PROCEDURE — 87077 CULTURE AEROBIC IDENTIFY: CPT | Performed by: INTERNAL MEDICINE

## 2018-02-09 PROCEDURE — 74011250636 HC RX REV CODE- 250/636: Performed by: INTERNAL MEDICINE

## 2018-02-09 PROCEDURE — 74011000250 HC RX REV CODE- 250

## 2018-02-09 RX ORDER — SODIUM CHLORIDE 0.9 % (FLUSH) 0.9 %
5-10 SYRINGE (ML) INJECTION AS NEEDED
Status: DISCONTINUED | OUTPATIENT
Start: 2018-02-09 | End: 2018-02-09 | Stop reason: HOSPADM

## 2018-02-09 RX ORDER — EPINEPHRINE 0.1 MG/ML
1 INJECTION INTRACARDIAC; INTRAVENOUS
Status: DISCONTINUED | OUTPATIENT
Start: 2018-02-09 | End: 2018-02-09 | Stop reason: HOSPADM

## 2018-02-09 RX ORDER — NALOXONE HYDROCHLORIDE 0.4 MG/ML
0.4 INJECTION, SOLUTION INTRAMUSCULAR; INTRAVENOUS; SUBCUTANEOUS
Status: DISCONTINUED | OUTPATIENT
Start: 2018-02-09 | End: 2018-02-09 | Stop reason: HOSPADM

## 2018-02-09 RX ORDER — PROPOFOL 10 MG/ML
INJECTION, EMULSION INTRAVENOUS AS NEEDED
Status: DISCONTINUED | OUTPATIENT
Start: 2018-02-09 | End: 2018-02-09 | Stop reason: HOSPADM

## 2018-02-09 RX ORDER — SODIUM CHLORIDE 9 MG/ML
50 INJECTION, SOLUTION INTRAVENOUS CONTINUOUS
Status: DISCONTINUED | OUTPATIENT
Start: 2018-02-09 | End: 2018-02-09 | Stop reason: HOSPADM

## 2018-02-09 RX ORDER — SODIUM CHLORIDE 0.9 % (FLUSH) 0.9 %
5-10 SYRINGE (ML) INJECTION EVERY 8 HOURS
Status: DISCONTINUED | OUTPATIENT
Start: 2018-02-09 | End: 2018-02-09 | Stop reason: HOSPADM

## 2018-02-09 RX ORDER — ATROPINE SULFATE 0.1 MG/ML
0.5 INJECTION INTRAVENOUS
Status: DISCONTINUED | OUTPATIENT
Start: 2018-02-09 | End: 2018-02-09 | Stop reason: HOSPADM

## 2018-02-09 RX ORDER — DEXTROMETHORPHAN/PSEUDOEPHED 2.5-7.5/.8
1.2 DROPS ORAL
Status: DISCONTINUED | OUTPATIENT
Start: 2018-02-09 | End: 2018-02-09 | Stop reason: HOSPADM

## 2018-02-09 RX ORDER — FLUMAZENIL 0.1 MG/ML
0.2 INJECTION INTRAVENOUS
Status: DISCONTINUED | OUTPATIENT
Start: 2018-02-09 | End: 2018-02-09 | Stop reason: HOSPADM

## 2018-02-09 RX ORDER — LIDOCAINE HYDROCHLORIDE 20 MG/ML
INJECTION, SOLUTION EPIDURAL; INFILTRATION; INTRACAUDAL; PERINEURAL AS NEEDED
Status: DISCONTINUED | OUTPATIENT
Start: 2018-02-09 | End: 2018-02-09 | Stop reason: HOSPADM

## 2018-02-09 RX ADMIN — PROPOFOL 20 MG: 10 INJECTION, EMULSION INTRAVENOUS at 10:34

## 2018-02-09 RX ADMIN — PROPOFOL 50 MG: 10 INJECTION, EMULSION INTRAVENOUS at 10:30

## 2018-02-09 RX ADMIN — SODIUM CHLORIDE 50 ML/HR: 900 INJECTION, SOLUTION INTRAVENOUS at 09:59

## 2018-02-09 RX ADMIN — PROPOFOL 50 MG: 10 INJECTION, EMULSION INTRAVENOUS at 10:20

## 2018-02-09 RX ADMIN — PROPOFOL 50 MG: 10 INJECTION, EMULSION INTRAVENOUS at 10:25

## 2018-02-09 RX ADMIN — PROPOFOL 50 MG: 10 INJECTION, EMULSION INTRAVENOUS at 10:18

## 2018-02-09 RX ADMIN — PROPOFOL 100 MG: 10 INJECTION, EMULSION INTRAVENOUS at 10:16

## 2018-02-09 RX ADMIN — LIDOCAINE HYDROCHLORIDE 100 MG: 20 INJECTION, SOLUTION EPIDURAL; INFILTRATION; INTRACAUDAL; PERINEURAL at 10:16

## 2018-02-09 NOTE — DISCHARGE INSTRUCTIONS
Cal Bell  [id number]  1955    COLON DISCHARGE INSTRUCTIONS  Discomfort:  Redness at IV site- apply warm compress to area; if redness or soreness persist- contact your physician  There may be a slight amount of blood passed from the rectum  Gaseous discomfort- walking, belching will help relieve any discomfort  You may not operate a vehicle for 12 hours  You may not engage in an occupation involving machinery or appliances for rest of today  You may not drink alcoholic beverages for at least 12 hours  Avoid making any critical decisions for at least 24 hour  DIET:   Regular diet    - however -  remember your colon is empty and a heavy meal will produce gas. ACTIVITY:  It is recommended that you spend the remainder of the day resting -  avoid any strenuous activity. CALL M.D. ANY SIGN OF:   Increasing pain, nausea, vomiting  Abdominal distension (swelling)  New increased bleeding (oral or rectal)  Fever (chills)    Follow-up Instructions:   Call Dr. Peyton Bob  Telephone # [phone]  Brief Findings: normal        DISCHARGE SUMMARY from Nurse    The following personal items collected during your admission are returned to you:   Dental Appliance: Dental Appliances: None  Vision: Visual Aid: None  Hearing Aid:    Jewelry:    Clothing:    Other Valuables:    Valuables sent to safe: [employer] Activation    Thank you for requesting access to [employer]. Please follow the instructions below to securely access and download your online medical record. [employer] allows you to send messages to your doctor, view your test results, renew your prescriptions, schedule appointments, and more. How Do I Sign Up? 1. In your internet browser, go to [email or web address]  2. Click on the First Time User? Click Here link in the Sign In box. You will be redirect to the New Member Sign Up page. 3. Enter your [employer] Access Code exactly as it appears below.  You will not need to use this code after youve completed the sign-up process. If you do not sign up before the expiration date, you must request a new code. TheBankCloud Access Code: [email or web address]  Expires: 3/28/2018  9:49 AM (This is the date your TheBankCloud access code will )    4. Enter the last four digits of your Social Security Number (xxxx) and Date of Birth (mm/dd/yyyy) as indicated and click Submit. You will be taken to the next sign-up page. 5. Create a TheBankCloud ID. This will be your TheBankCloud login ID and cannot be changed, so think of one that is secure and easy to remember. 6. Create a TheBankCloud password. You can change your password at any time. 7. Enter your Password Reset Question and Answer. This can be used at a later time if you forget your password. 8. Enter your e-mail address. You will receive e-mail notification when new information is available in [address]. 9. Click Sign Up. You can now view and download portions of your medical record. 10. Click the Download Summary menu link to download a portable copy of your medical information. Additional Information    If you have questions, please visit the Frequently Asked Questions section of the TheBankCloud website at [email or web address] Remember, TheBankCloud is NOT to be used for urgent needs. For medical emergencies, dial 911.

## 2018-02-09 NOTE — IP AVS SNAPSHOT
[address] [facility] [address]. 
[phone] Patient: Loc Ricks MRN: [id number] PHL:2/11/3447 About your hospitalization You were admitted on:  February 9, 2018 You last received care in the:  [facility] ENDOSCOPY You were discharged on:  February 9, 2018 Why you were hospitalized Your primary diagnosis was:  Not on File Follow-up Information None Discharge Orders None A check shawn indicates which time of day the medication should be taken. My Medications CONTINUE taking these medications Instructions Each Dose to Equal  
 Morning Noon Evening Bedtime  
 biotin 5 mg capsule Commonly known as:  VITAMIN B7 Your last dose was: Your next dose is: Take 10 mg by mouth. 10 mg  
    
   
   
   
  
 clotrimazole-betamethasone topical cream  
Commonly known as:  Myrtie Hint Your last dose was: Your next dose is:    
   
   
 Apply  to affected area two (2) times a day. FISH OIL 1,000 mg Cap Generic drug:  omega-3 fatty acids-vitamin e Your last dose was: Your next dose is: Take 1 Cap by mouth daily. 1 Cap  
    
   
   
   
  
 lisinopril-hydroCHLOROthiazide 10-12.5 mg per tablet Commonly known as:  Ari Mcrae Your last dose was: Your next dose is: Take 1 Tab by mouth daily. 1 Tab MYRBETRIQ 25 mg ER tablet Generic drug:  mirabegron ER Your last dose was: Your next dose is: Take 25 mg by mouth daily. 25 mg NexIUM 40 mg capsule Generic drug:  esomeprazole Your last dose was: Your next dose is: Take  by mouth daily. potassium chloride SA 10 mEq capsule Commonly known as:  Nataly Cox Your last dose was: Your next dose is: Take 20 mEq by mouth daily. 20 mEq  
    
   
   
   
  
 simvastatin 20 mg tablet Commonly known as:  ZOCOR Your last dose was: Your next dose is: Take  by mouth nightly. tolterodine ER 4 mg ER capsule Commonly known as:  Cassy Baldwin Your last dose was: Your next dose is: Take 4 mg by mouth daily. 4 mg VITAMIN D2 PO Your last dose was: Your next dose is: Take  by mouth. Discharge Instructions Cal Sanjay Arabella [id number] 1955 COLON DISCHARGE INSTRUCTIONS Discomfort: 
Redness at IV site- apply warm compress to area; if redness or soreness persist- contact your physician There may be a slight amount of blood passed from the rectum Gaseous discomfort- walking, belching will help relieve any discomfort You may not operate a vehicle for 12 hours You may not engage in an occupation involving machinery or appliances for rest of today You may not drink alcoholic beverages for at least 12 hours Avoid making any critical decisions for at least 24 hour DIET: 
 Regular diet  however -  remember your colon is empty and a heavy meal will produce gas. ACTIVITY: It is recommended that you spend the remainder of the day resting -  avoid any strenuous activity. CALL M.D. ANY SIGN OF: Increasing pain, nausea, vomiting Abdominal distension (swelling) New increased bleeding (oral or rectal) Fever (chills) Follow-up Instructions: 
 Call Dr. Peyton Bob Telephone # [phone] Brief Findings: normal 
 
 
 
DISCHARGE SUMMARY from Nurse The following personal items collected during your admission are returned to you:  
Dental Appliance: Dental Appliances: None Vision: Visual Aid: None Hearing Aid:   
Jewelry:   
Clothing:   
Other Valuables:   
Valuables sent to safe: [employer] Thank you for requesting access to [employer]. Please follow the instructions below to securely access and download your online medical record. [employer] allows you to send messages to your doctor, view your test results, renew your prescriptions, schedule appointments, and more. How Do I Sign Up? 1. In your internet browser, go to [email or web address] 
2. Click on the First Time User? Click Here link in the Sign In box. You will be redirect to the New Member Sign Up page. 3. Enter your [employer] Access Code exactly as it appears below. You will not need to use this code after youve completed the sign-up process. If you do not sign up before the expiration date, you must request a new code. [employer] Access Code: [email or web address] Expires: 3/28/2018  9:49 AM (This is the date your [employer] access code will ) 4. Enter the last four digits of your Social Security Number (xxxx) and Date of Birth (mm/dd/yyyy) as indicated and click Submit. You will be taken to the next sign-up page. 5. Create a [employer] ID. This will be your [employer] login ID and cannot be changed, so think of one that is secure and easy to remember. 6. Create a [employer] password. You can change your password at any time. 7. Enter your Password Reset Question and Answer. This can be used at a later time if you forget your password. 8. Enter your e-mail address. You will receive e-mail notification when new information is available in [address] 9. Click Sign Up. You can now view and download portions of your medical record. 10. Click the Download Summary menu link to download a portable copy of your medical information. Additional Information If you have questions, please visit the Frequently Asked Questions section of the [employer] website at [email or web address] Remember, [employer]ek is NOT to be used for urgent needs. For medical emergencies, dial 911. Introducing [facility] & HEALTH SERVICES! Tamiko Stark introduces thePlatform patient portal. Now you can access parts of your medical record, email your doctor's office, and request medication refills online. 1. In your internet browser, go to [email or web address] 2. Click on the First Time User? Click Here link in the Sign In box. You will see the New Member Sign Up page. 3. Enter your thePlatform Access Code exactly as it appears below. You will not need to use this code after youve completed the sign-up process. If you do not sign up before the expiration date, you must request a new code. · thePlatform Access Code: [email or web address] Expires: 3/28/2018  9:49 AM 
 
4. Enter the last four digits of your Social Security Number (xxxx) and Date of Birth (mm/dd/yyyy) as indicated and click Submit. You will be taken to the next sign-up page. 5. Create a thePlatform ID. This will be your thePlatform login ID and cannot be changed, so think of one that is secure and easy to remember. 6. Create a thePlatform password. You can change your password at any time. 7. Enter your Password Reset Question and Answer. This can be used at a later time if you forget your password. 8. Enter your e-mail address. You will receive e-mail notification when new information is available in [address] 9. Click Sign Up. You can now view and download portions of your medical record. 10. Click the Download Summary menu link to download a portable copy of your medical information. If you have questions, please visit the Frequently Asked Questions section of the thePlatform website. Remember, thePlatform is NOT to be used for urgent needs. For medical emergencies, dial 911. Now available from your iPhone and Android! Providers Seen During Your Hospitalization Provider Specialty Primary office phone Nick Van MD Gastroenterology [phone] Your Primary Care Physician (PCP) Primary Care Physician Office Phone Office Fax Willie Babb  You are allergic to the following Allergen Reactions Adhesive Tape-Silicones Unknown (comments) Recent Documentation Height Weight Breastfeeding? BMI OB Status Smoking Status 1.676 m (!) 165.7 kg No 58.95 kg/m2 Postmenopausal Never Smoker Emergency Contacts Name Discharge Info Relation Home Work Mobile Eren Bell DISCHARGE CAREGIVER [3] Spouse [3] [phone] Patient Belongings The following personal items are in your possession at time of discharge: 
  Dental Appliances: None  Visual Aid: None Please provide this summary of care documentation to your next provider. Signatures-by signing, you are acknowledging that this After Visit Summary has been reviewed with you and you have received a copy. Patient Signature:  ____________________________________________________________ Date:  ____________________________________________________________  
  
Pennie Samreen Provider Signature:  ____________________________________________________________ Date:  ____________________________________________________________

## 2018-02-09 NOTE — ANESTHESIA POSTPROCEDURE EVALUATION
Post-Anesthesia Evaluation and Assessment    Patient: Aidan Torres MRN: [id number]  SSN: [id number]    YOB: 1955  Age: 60 y.o. Sex: female       Cardiovascular Function/Vital Signs  Visit Vitals    /83    Pulse 85    Temp 36.4 °C (97.6 °F)    Resp 20    Ht 5' 6\" (1.676 m)    Wt (!) 165.7 kg (365 lb 4 oz)    SpO2 100%    Breastfeeding No    BMI 58.95 kg/m2       Patient is status post general, total IV anesthesia anesthesia for Procedure(s):  ESOPHAGOGASTRODUODENOSCOPY (EGD)  COLONOSCOPY  ESOPHAGOGASTRODUODENAL (EGD) BIOPSY. Nausea/Vomiting: None    Postoperative hydration reviewed and adequate. Pain:  Pain Scale 1: Numeric (0 - 10) (02/09/18 1105)  Pain Intensity 1: 0 (02/09/18 1105)   Managed    Neurological Status: At baseline    Mental Status and Level of Consciousness: Arousable    Pulmonary Status:   O2 Device: Room air (02/09/18 1105)   Adequate oxygenation and airway patent    Complications related to anesthesia: None    Post-anesthesia assessment completed.  No concerns    Signed By: Leesa Hannah MD     February 9, 2018

## 2018-02-09 NOTE — PROCEDURES
Colonoscopy Operative Report  Akira Baldwin [facility]  1955  [id number]      Procedure Type:   Colonoscopy --diagnostic     Indications:     Abdominal pain, LLQ    Pre-operative Diagnosis: see indication above    Post-operative Diagnosis:  See findings below    : Ambreen Nolen MD    Referring Provider: Claudio    Sedation:  MAC anesthesia Propofol    Pre-Procedural Exam:      Airway: clear, Malimpati 2   Heart: RRR, without gallops or rubs  Lungs: clear bilaterally without wheezes, crackles, or rhonchi  Abdomen: soft, nontender, nondistended, bowel sounds present     Procedure Details:  After informed consent was obtained with all risks and benefits of procedure explained and preoperative exam completed, the patient was taken to the endoscopy suite and placed in the left lateral decubitus position. Upon sequential sedation as per above, a digital rectal exam was performed. The Olympus videocolonoscope [id number] was inserted in the rectum and carefully advanced to the hepatic flexure. The quality of preparation was good. The colonoscope was slowly withdrawn with careful evaluation between folds. Retoflexion in the rectum was completed. Findings/Impression: ANUS: Anal exam reveals no masses or hemorrhoids, sphincter tone is normal.   RECTUM: Rectal exam reveals no masses or hemorrhoids. SIGMOID COLON: The mucosa is normal with good vascular pattern and without ulcers, diverticula, and polyps. DESCENDING COLON: The mucosa is normal with good vascular pattern and without ulcers, diverticula, and polyps. SPLENIC FLEXURE: The splenic flexure is normal.   TRANSVERSE COLON: The mucosa is normal with good vascular pattern and without ulcers, diverticula, and polyps. HEPATIC FLEXURE: The hepatic flexure is normal.  Unable to advance due to loop in scope         Specimen Removed:  none    Complications: None. EBL:  None.     Recommendations: --For colon cancer screening in this average-risk patient, colonoscopy may be repeated in 10 years. , -Follow up with primary care physician. Regular diet. Resume normal medication(s). Discharge Disposition:  Home in the company of a  when able to ambulate.

## 2018-02-09 NOTE — PROCEDURES
Endoscopic Gastroduodenoscopy Procedure Note  Elsi [facility]  1955  [id number]      Procedure: Endoscopic Gastroduodenoscopy with VEGA test    Indication:  Abdominal pain, epigastric    Pre-operative Diagnosis: see indication above    Post-operative Diagnosis: see findings below    :  Yo Olson MD    Referring Provider:  Benito Mcgovern    Anethesia/Sedation:  MAC anesthesia Propofol    Pre-Procedural Exam:      Airway: clear, Malimpati 2   Heart: RRR, without gallops or rubs  Lungs: clear bilaterally without wheezes, crackles, or rhonchi  Abdomen: soft, nontender, nondistended, bowel sounds present        Procedure Details     After infom consent was obtained for the procedure, with all risks and benefits of procedure explained the patient was taken to the endoscopy suite and placed in the left lateral decubitus position. Following sequential administration of sedation as per above, the [id number] gastroscope was inserted into the mouth and advanced under direct vision to second portion of the duodenum. A careful inspection was made as the gastroscope was withdrawn, including a retroflexed view of the proximal stomach; findings and interventions are described below. Findings/Impression: ESOPHAGUS: The esophagus is normal. The proximal, mid, and distal portions are normal. The Z-Line is intact. Moderate hiatus hernia  STOMACH: The fundus on antegrade and retroflex views is normal. The body, antrum, and pylorus are normal.   DUODENUM: The bulb and second portions are normal.    Therapies:  none    Specimens: vega          Complications:   None; patient tolerated the procedure well. EBL:  None. Recommendations:  -Await VEGA test result and treat for Helicobacter pylori if positive. , -Follow symptoms. , -Follow up with primary care physician    Discharge Disposition:

## 2018-02-09 NOTE — H&P
Gastroenterology History and Physical    Patient: Guillermo Bell    Physician: Jim Padilla MD    Vital Signs: Blood pressure (!) 150/93, pulse 95, temperature 97.6 °F (36.4 °C), resp. rate 17, height 5' 6\" (1.676 m), weight (!) 165.7 kg (365 lb 4 oz), SpO2 97 %, not currently breastfeeding. Allergies: Allergies   Allergen Reactions    Adhesive Tape-Silicones Unknown (comments)       Indication: epigastric and LLQ pain    History:  Past Medical History:   Diagnosis Date    Abdominal pain 8/23/2017    Annual physical exam 8/23/2017    Arthritis     Chronic pain     Contact dermatitis due to cosmetics 8/23/2017    Cough 8/23/2017    Degenerative joint disease 8/23/2017    Dietary counseling 8/23/2017    DJD (degenerative joint disease) of knee 8/23/2017    Gallstones 8/23/2017    Gastroesophageal reflux disease with hiatal hernia 8/23/2017    GERD (gastroesophageal reflux disease)     Hip pain, right 8/23/2017    History of transfusion 8/23/2017    HTN (hypertension) 8/23/2017    Hypercholesteremia     Hyperlipidemia 8/23/2017    Hypertension     Hypokalemia 8/23/2017    Left thyroid nodule 8/23/2017    Leg swelling 8/23/2017    Lower abdominal pain 8/23/2017    Lumbar disc disease 8/23/2017    Menopause 8/23/2017    Morbid obesity ([address] 8/23/2017    Obesity (BMI 30-39. 9) 8/23/2017    Overactive bladder     Rash 8/23/2017    Rhinitis, allergic 8/23/2017    Screening for diabetes mellitus (DM) 8/23/2017    Screening for hypothyroidism 8/23/2017    Swelling of both lower extremities 8/23/2017    Tinea corporis 8/23/2017    Urinary incontinence 8/23/2017    Vitamin D deficiency 8/23/2017      Past Surgical History:   Procedure Laterality Date    ABDOMEN SURGERY PROC UNLISTED      HERNIA    COLONOSCOPY,DIAGNOSTIC  11/12/2014         HX CATARACT REMOVAL Right     HX HEENT      NODULES-VOCAL CORD    HX ORTHOPAEDIC  2004    ELGIIO KNEE REPLACEMENT    HX TONSILLECTOMY  AS CHILD    HX TUBAL LIGATION        Social History     Social History    Marital status:      Spouse name: N/A    Number of children: N/A    Years of education: N/A     Occupational History    retired      Social History Main Topics    Smoking status: Never Smoker    Smokeless tobacco: Never Used    Alcohol use No    Drug use: No    Sexual activity: Not Asked     Other Topics Concern    None     Social History Narrative      Family History   Problem Relation Age of Onset    Diabetes Mother     Cancer Father     Hypertension Sister     Hypertension Brother     Heart Disease Brother        Medications:   Prior to Admission medications    Medication Sig Start Date End Date Taking? Authorizing Provider   lisinopril-hydroCHLOROthiazide (PRINZIDE, ZESTORETIC) 10-12.5 mg per tablet Take 1 Tab by mouth daily. Yes Historical Provider   biotin (VITAMIN B7) 5 mg capsule Take 10 mg by mouth. Yes Historical Provider   mirabegron ER (MYRBETRIQ) 25 mg ER tablet Take 25 mg by mouth daily. Yes Historical Provider   ERGOCALCIFEROL, VITAMIN D2, (VITAMIN D2 PO) Take  by mouth. Yes Historical Provider   tolterodine ER (DETROL LA) 4 mg ER capsule Take 4 mg by mouth daily. Yes Historical Provider   omega-3 fatty acids-vitamin e (FISH OIL) 1,000 mg cap Take 1 Cap by mouth daily. Yes Historical Provider   potassium chloride SA (MICRO-K) 10 mEq capsule Take 20 mEq by mouth daily. Yes Bernard Man MD   esomeprazole (NEXIUM) 40 mg capsule Take  by mouth daily. Yes Bernard Man MD   simvastatin (ZOCOR) 20 mg tablet Take  by mouth nightly. Yes Bernard Man MD   clotrimazole-betamethasone (LOTRISONE) topical cream Apply  to affected area two (2) times a day. Historical Provider       Physical Exam:   HEENT: Head: Normocephalic, no lesions, without obvious abnormality.    Heart: regular rate and rhythm, S1, S2 normal, no murmur, click, rub or gallop   Lungs: chest clear, no wheezing, rales, normal symmetric air entry, Heart exam - S1, S2 normal, no murmur, no gallop, rate regular   Abdominal: Bowel sounds are normal, liver is not enlarged, spleen is not enlarged     Signed:  Ankit Park MD 2/9/2018

## 2018-02-09 NOTE — ANESTHESIA PREPROCEDURE EVALUATION
Anesthetic History   No history of anesthetic complications            Review of Systems / Medical History  Patient summary reviewed, nursing notes reviewed and pertinent labs reviewed    Pulmonary  Within defined limits                 Neuro/Psych   Within defined limits           Cardiovascular    Hypertension: well controlled              Exercise tolerance: >4 METS     GI/Hepatic/Renal     GERD: well controlled           Endo/Other      Hypothyroidism: poorly controlled  Morbid obesity, arthritis (generalized; knees are worst) and anemia     Other Findings   Comments: Overactive bladder         Physical Exam    Airway  Mallampati: II  TM Distance: > 6 cm  Neck ROM: normal range of motion   Mouth opening: Normal     Cardiovascular    Rhythm: regular  Rate: normal      Pertinent negatives: No murmur   Dental    Dentition: Lower dentition intact and Upper dentition intact  Comments: Permanent upper retainer   Pulmonary      Decreased breath sounds: bibasilar           Abdominal  GI exam deferred       Other Findings            Anesthetic Plan    ASA: 3  Anesthesia type: general and total IV anesthesia          Induction: Intravenous  Anesthetic plan and risks discussed with: Patient

## 2018-02-09 NOTE — PROGRESS NOTES
Paulino Bell  1955  [id number]    Situation:  Verbal report received from: Serafin Kraft rn  Procedure: Procedure(s) with comments:  ESOPHAGOGASTRODUODENOSCOPY (EGD)  COLONOSCOPY  ESOPHAGOGASTRODUODENAL (EGD) BIOPSY - biopsy    Background:    Preoperative diagnosis: LLQ ABD PAIN, EPIGASTRIC ABD PAIN  Postoperative diagnosis: EGD - moderate size hiatal hernia,  Colon - Normal colon to hepatic flexure    :  Dr. Darwin Adame  Assistant(s): Endoscopy Technician-1: Lena Eller  Endoscopy RN-1: Dannielle De León RN; Gabby Li RN    Specimens: * No specimens in log *  H. Pylori  yes    Assessment:  Intra-procedure medications     Anesthesia gave intra-procedure sedation and medications, see anesthesia flow sheet yes    Intravenous fluids: NS@ Ulus May     Vital signs stable  yes    Abdominal assessment: round and soft  yes    Recommendation:  Discharge patient per MD order yes.   Family or Friend  yes  Permission to share finding with family or friend yes

## 2018-04-10 ENCOUNTER — TELEPHONE (OUTPATIENT)
Dept: INTERNAL MEDICINE CLINIC | Age: 63
End: 2018-04-10

## 2018-04-10 NOTE — TELEPHONE ENCOUNTER
Maryuri Spicer phoned the office requesting the results of her CT of the abdomen and repeat colonoscopy. Patient may be reached at [phone] (Y). Addendum:  Informed patient CT was normal which is why Vin sent her to Dr. John Rg who then did the EGD & colonoscopy. Told patient Dr. Pat Rubio office has the results of those 2 tests & gave her that office number.

## 2018-05-16 RX ORDER — RABEPRAZOLE SODIUM 20 MG/1
TABLET, DELAYED RELEASE ORAL
Qty: 90 TAB | Refills: 3 | Status: SHIPPED | OUTPATIENT
Start: 2018-05-16 | End: 2018-10-22 | Stop reason: SDUPTHER

## 2018-05-16 RX ORDER — POTASSIUM CHLORIDE 750 MG/1
CAPSULE, EXTENDED RELEASE ORAL
Qty: 180 CAP | Refills: 3 | Status: SHIPPED | OUTPATIENT
Start: 2018-05-16 | End: 2018-10-22 | Stop reason: SDUPTHER

## 2018-05-30 RX ORDER — SIMVASTATIN 40 MG/1
TABLET, FILM COATED ORAL
Qty: 90 TAB | Refills: 3 | Status: SHIPPED | OUTPATIENT
Start: 2018-05-30 | End: 2018-10-05 | Stop reason: SDUPTHER

## 2018-05-30 RX ORDER — LISINOPRIL AND HYDROCHLOROTHIAZIDE 10; 12.5 MG/1; MG/1
TABLET ORAL
Qty: 90 TAB | Refills: 3 | Status: SHIPPED | OUTPATIENT
Start: 2018-05-30 | End: 2018-07-17 | Stop reason: SDUPTHER

## 2018-07-17 RX ORDER — LISINOPRIL AND HYDROCHLOROTHIAZIDE 10; 12.5 MG/1; MG/1
1 TABLET ORAL DAILY
Qty: 90 TAB | Refills: 3 | Status: SHIPPED | OUTPATIENT
Start: 2018-07-17 | End: 2018-10-22 | Stop reason: SDUPTHER

## 2018-10-05 RX ORDER — SIMVASTATIN 40 MG/1
TABLET, FILM COATED ORAL
Qty: 15 TAB | Refills: 0 | Status: SHIPPED | OUTPATIENT
Start: 2018-10-05 | End: 2018-10-22 | Stop reason: SDUPTHER

## 2018-10-05 NOTE — TELEPHONE ENCOUNTER
Pharmacy on file verified  Requested Prescriptions     Pending Prescriptions Disp Refills    simvastatin (ZOCOR) 40 mg tablet 15 Tab 0     Sig: TAKE 1 TABLET DAILY     *patient states that she is completely out of her medication and [facility] is on back order, she is requesting a 15 day supply (rec. Quantity per [facility]) sent to the pharmacy on file.     Best call back # for patient: [phone]

## 2018-10-22 ENCOUNTER — OFFICE VISIT (OUTPATIENT)
Dept: INTERNAL MEDICINE CLINIC | Age: 63
End: 2018-10-22

## 2018-10-22 VITALS
BODY MASS INDEX: 48.82 KG/M2 | SYSTOLIC BLOOD PRESSURE: 136 MMHG | HEIGHT: 65 IN | WEIGHT: 293 LBS | DIASTOLIC BLOOD PRESSURE: 83 MMHG | OXYGEN SATURATION: 98 % | HEART RATE: 81 BPM

## 2018-10-22 DIAGNOSIS — R53.83 FATIGUE, UNSPECIFIED TYPE: ICD-10-CM

## 2018-10-22 DIAGNOSIS — Z23 ENCOUNTER FOR IMMUNIZATION: ICD-10-CM

## 2018-10-22 DIAGNOSIS — E66.01 OBESITY, CLASS III, BMI 40-49.9 (MORBID OBESITY) (HCC): ICD-10-CM

## 2018-10-22 DIAGNOSIS — R73.9 HYPERGLYCEMIA: ICD-10-CM

## 2018-10-22 DIAGNOSIS — I10 ESSENTIAL HYPERTENSION: Primary | ICD-10-CM

## 2018-10-22 DIAGNOSIS — E78.2 MIXED HYPERLIPIDEMIA: ICD-10-CM

## 2018-10-22 DIAGNOSIS — K44.9 GASTROESOPHAGEAL REFLUX DISEASE WITH HIATAL HERNIA: ICD-10-CM

## 2018-10-22 DIAGNOSIS — K21.9 GASTROESOPHAGEAL REFLUX DISEASE WITH HIATAL HERNIA: ICD-10-CM

## 2018-10-22 DIAGNOSIS — R32 URINARY INCONTINENCE, UNSPECIFIED TYPE: ICD-10-CM

## 2018-10-22 DIAGNOSIS — E55.9 VITAMIN D DEFICIENCY: ICD-10-CM

## 2018-10-22 RX ORDER — RABEPRAZOLE SODIUM 20 MG/1
20 TABLET, DELAYED RELEASE ORAL DAILY
Qty: 90 TAB | Refills: 3 | Status: SHIPPED | OUTPATIENT
Start: 2018-10-22 | End: 2019-05-31 | Stop reason: SDUPTHER

## 2018-10-22 RX ORDER — SIMVASTATIN 40 MG/1
TABLET, FILM COATED ORAL
Qty: 15 TAB | Refills: 0 | Status: SHIPPED | OUTPATIENT
Start: 2018-10-22 | End: 2018-10-22 | Stop reason: SDUPTHER

## 2018-10-22 RX ORDER — DICLOFENAC SODIUM 10 MG/G
GEL TOPICAL 4 TIMES DAILY
COMMUNITY
End: 2022-06-14

## 2018-10-22 RX ORDER — LISINOPRIL AND HYDROCHLOROTHIAZIDE 10; 12.5 MG/1; MG/1
1 TABLET ORAL DAILY
Qty: 90 TAB | Refills: 3 | Status: SHIPPED | OUTPATIENT
Start: 2018-10-22 | End: 2019-06-20 | Stop reason: SDUPTHER

## 2018-10-22 RX ORDER — SIMVASTATIN 40 MG/1
TABLET, FILM COATED ORAL
Qty: 90 TAB | Refills: 3 | Status: SHIPPED | OUTPATIENT
Start: 2018-10-22 | End: 2018-10-29 | Stop reason: SDUPTHER

## 2018-10-22 RX ORDER — POTASSIUM CHLORIDE 750 MG/1
10 CAPSULE, EXTENDED RELEASE ORAL 2 TIMES DAILY
Qty: 180 CAP | Refills: 3 | Status: SHIPPED | OUTPATIENT
Start: 2018-10-22 | End: 2019-06-10 | Stop reason: SDUPTHER

## 2018-10-22 NOTE — PROGRESS NOTES
Susanakimberly Albrecht presents with   Chief Complaint   Patient presents with    Follow-up     overdue    Medication Refill   Patient here for an overdue followup visit. She is fasting. Has been seeing a specialist for her feet. Doesn't remember his name. 1. Have you been to the ER, urgent care clinic since your last visit? Hospitalized since your last visit? Yes When: 02/09/2018 Where: [address] [facility] Reason for visit: endoscopy    2. Have you seen or consulted any other health care providers outside of the [address] [place] [address] since your last visit? Include any pap smears or colon screening.  Yes Reason for visit: feet

## 2018-10-22 NOTE — PROGRESS NOTES
Subjective:  Ms. Daphne Dorado is a pleasant 63year old lady who returns today after almost a ten month absence. She was asked to come in to be seen so we could refill all of her medications. Today she has no complaints. Her medical problems include:  1. HTN that has been managed effectively on Lisinopril/HCTZ 10/12.5 mg daily. She denies any headache, dizziness or blurred vision. She denies any chest pain or palpitations. She denies shortness of breath, cough, wheezing, PND or orthopnea. She does have chronic ankle edema. 2. GERD that is currently being managed on Aciphex 20 mg daily. She did have an endoscopy by Dr. Teodora Eldridge back in February of 2018 that was normal.   In addition at that time she also had a colonoscopy that was also entirely normal and she's to have a repeat one in ten years. 3. Hyperlipidemia, managed on Simvastatin. She denies any joint pain or muscle aches. 4. Urinary incontinence. She is under the care of Dr. Roberto Peters from [facility]. She has been managed on Myrbetriq. She did have a pelvic and pap through the [facility] last year. 5. Morbid obesity. She continued to really struggle with her weight. She's now up to 401 lbs. I have discussed with her bariatric surgery, but she has no desire. Physical Examination:  GENERAL:  Pleasant, markedly obese lady in no acute distress. She is alert and oriented. She answers my questions appropriately. She has great difficulty getting from a sitting to standing position. She ambulates with a cane. VITALS:  BP: 136/83. P: 81.  O2 sat: 98.  WT: 401 lbs. HEENT:  Normocephalic, atraumatic. NECK:  Supple without adenopathy or thyromegaly. No carotid bruits. CHEST:  Lungs clear to auscultation, no rales or wheezes. CV:  Heart regular rhythm without murmur or gallop. EXTREMITIES:  She had trace pitting edema, no calf tenderness. Distal pulses were present.     Impression:  1. HTN, stable. 2. Hyperlipidemia. 3. Morbid obesity. 4. GERD. 5. Urinary incontinence. Plan:  1. She was fasting this morning so therefore it was opted to do all of her fasting lab studies. I will continue to see her every six months. 2. She is scheduled for her mammogram in the next few days at the [facility]. 3. While here today she did get her flu vaccine in her right deltoid, which she tolerated well. She did get her Shingrix last year from [facility], therefore we will obtain these results. 4. Once again I discussed at length today with her the importance of a prudent diet, some mild exercise in order to get her BMI to an acceptable level. I am not sure how successful we will be with this. I will continue to encourage her.

## 2018-10-22 NOTE — PATIENT INSTRUCTIONS

## 2018-10-23 LAB
25(OH)D3+25(OH)D2 SERPL-MCNC: 29.6 NG/ML (ref 30–100)
ALBUMIN SERPL-MCNC: 4.3 G/DL (ref 3.6–4.8)
ALBUMIN/GLOB SERPL: 1.3 {RATIO} (ref 1.2–2.2)
ALP SERPL-CCNC: 110 IU/L (ref 39–117)
ALT SERPL-CCNC: 8 IU/L (ref 0–32)
AST SERPL-CCNC: 12 IU/L (ref 0–40)
BILIRUB SERPL-MCNC: 0.6 MG/DL (ref 0–1.2)
BUN SERPL-MCNC: 15 MG/DL (ref 8–27)
BUN/CREAT SERPL: 24 (ref 12–28)
CALCIUM SERPL-MCNC: 9.8 MG/DL (ref 8.7–10.3)
CHLORIDE SERPL-SCNC: 105 MMOL/L (ref 96–106)
CHOLEST SERPL-MCNC: 143 MG/DL (ref 100–199)
CO2 SERPL-SCNC: 23 MMOL/L (ref 20–29)
CREAT SERPL-MCNC: 0.62 MG/DL (ref 0.57–1)
EST. AVERAGE GLUCOSE BLD GHB EST-MCNC: 111 MG/DL
GLOBULIN SER CALC-MCNC: 3.4 G/DL (ref 1.5–4.5)
GLUCOSE SERPL-MCNC: 90 MG/DL (ref 65–99)
HBA1C MFR BLD: 5.5 % (ref 4.8–5.6)
HDLC SERPL-MCNC: 45 MG/DL
LDLC SERPL CALC-MCNC: 84 MG/DL (ref 0–99)
POTASSIUM SERPL-SCNC: 4.2 MMOL/L (ref 3.5–5.2)
PROT SERPL-MCNC: 7.7 G/DL (ref 6–8.5)
SODIUM SERPL-SCNC: 145 MMOL/L (ref 134–144)
T4 FREE SERPL-MCNC: 1.3 NG/DL (ref 0.82–1.77)
TRIGL SERPL-MCNC: 68 MG/DL (ref 0–149)
TSH SERPL DL<=0.005 MIU/L-ACNC: 2.57 UIU/ML (ref 0.45–4.5)
VLDLC SERPL CALC-MCNC: 14 MG/DL (ref 5–40)

## 2018-10-29 ENCOUNTER — TELEPHONE (OUTPATIENT)
Dept: INTERNAL MEDICINE CLINIC | Age: 63
End: 2018-10-29

## 2018-10-29 LAB
BACTERIA UA POCT, BACTPOCT: NORMAL
BILIRUB UR QL STRIP: NEGATIVE
CASTS UA POCT: 0
CLUE CELLS, CLUEPOCT: NEGATIVE
CRYSTALS UA POCT, CRYSPOCT: NEGATIVE
EPITHELIAL CELLS POCT: NEGATIVE
GLUCOSE UR-MCNC: NEGATIVE MG/DL
GRAN# POC: 3.2 K/UL (ref 2–7.8)
GRAN% POC: 68.9 % (ref 37–92)
HCT VFR BLD CALC: 36.1 % (ref 37–51)
HGB BLD-MCNC: 11.4 G/DL (ref 12–18)
KETONES P FAST UR STRIP-MCNC: NEGATIVE MG/DL
LY# POC: 1.1 K/UL (ref 0.6–4.1)
LY% POC: 27.4 % (ref 10–58.5)
MCH RBC QN: 27.3 PG (ref 26–32)
MCHC RBC-ENTMCNC: 31.6 G/DL (ref 30–36)
MCV RBC: 86 FL (ref 80–97)
MID #, POC: 1 K/UL (ref 0–1.8)
MID% POC: 3.7 % (ref 0.1–24)
MUCUS UA POCT, MUCPOCT: NORMAL
PH UR STRIP: 6 [PH] (ref 5–7)
PLATELET # BLD: 206 K/UL (ref 140–440)
PROT UR QL STRIP: NEGATIVE
RBC # BLD: 4.17 M/UL (ref 4.2–6.3)
RBC UA POCT, RBCPOCT: 0
SP GR UR STRIP: 1.02 (ref 1.01–1.02)
TRICH UA POCT, TRICHPOC: NEGATIVE
UA UROBILINOGEN AMB POC: NORMAL (ref 0.2–1)
URINALYSIS CLARITY POC: CLEAR
URINALYSIS COLOR POC: NORMAL
URINE BLOOD POC: NEGATIVE
URINE CULT COMMENT, POCT: NORMAL
URINE LEUKOCYTES POC: NEGATIVE
URINE NITRITES POC: NEGATIVE
WBC # BLD: 4.4 K/UL (ref 4.1–10.9)
WBC UA POCT, WBCPOCT: 0
YEAST UA POCT, YEASTPOC: NEGATIVE

## 2018-10-29 RX ORDER — SIMVASTATIN 40 MG/1
TABLET, FILM COATED ORAL
Qty: 90 TAB | Refills: 3 | Status: SHIPPED | OUTPATIENT
Start: 2018-10-29 | End: 2018-11-05 | Stop reason: SDUPTHER

## 2018-10-29 NOTE — TELEPHONE ENCOUNTER
Parish Bell, patient of Buck Caraballo NP, phoned the office stating [facility] informed her they did not receive her simvastatin, however upon research computer displays 'Receipt confirmed by pharmacy 10/22/2018 at 1:09 PM'.     I verified [facility] address & phone number with patient, & below is the contact for [facility] that patient has:    [facility]  [phone]   [address] [place] [address], [address] [place] [address],First Floor

## 2018-11-05 ENCOUNTER — TELEPHONE (OUTPATIENT)
Dept: INTERNAL MEDICINE CLINIC | Age: 63
End: 2018-11-05

## 2018-11-05 RX ORDER — SIMVASTATIN 40 MG/1
TABLET, FILM COATED ORAL
Qty: 30 TAB | Refills: 0 | Status: SHIPPED | OUTPATIENT
Start: 2018-11-05 | End: 2019-10-31 | Stop reason: SDUPTHER

## 2019-04-23 ENCOUNTER — OFFICE VISIT (OUTPATIENT)
Dept: INTERNAL MEDICINE CLINIC | Age: 64
End: 2019-04-23

## 2019-04-23 VITALS
RESPIRATION RATE: 17 BRPM | HEIGHT: 65 IN | WEIGHT: 293 LBS | SYSTOLIC BLOOD PRESSURE: 126 MMHG | HEART RATE: 86 BPM | TEMPERATURE: 97.8 F | BODY MASS INDEX: 48.82 KG/M2 | OXYGEN SATURATION: 99 % | DIASTOLIC BLOOD PRESSURE: 84 MMHG

## 2019-04-23 DIAGNOSIS — I10 ESSENTIAL HYPERTENSION: Primary | ICD-10-CM

## 2019-04-23 LAB
ANION GAP SERPL CALC-SCNC: 13 MMOL/L
BUN SERPL-MCNC: 20 MG/DL (ref 7–17)
CALCIUM SERPL-MCNC: 9.5 MG/DL (ref 8.4–10.2)
CHLORIDE SERPL-SCNC: 106 MMOL/L (ref 98–107)
CO2 SERPL-SCNC: 28 MMOL/L (ref 22–32)
CREAT SERPL-MCNC: 0.6 MG/DL (ref 0.7–1.2)
ERYTHROCYTE [DISTWIDTH] IN BLOOD BY AUTOMATED COUNT: 12.9 %
GLUCOSE SERPL-MCNC: 82 MG/DL (ref 65–105)
HCT VFR BLD AUTO: 34.5 % (ref 37–51)
HGB BLD-MCNC: 11.5 G/DL (ref 12–18)
LYMPHOCYTES ABSOLUTE: 1.3 K/UL (ref 0.6–4.1)
LYMPHOCYTES NFR BLD: 27.9 % (ref 10–58.5)
MCH RBC QN AUTO: 28.9 PG (ref 26–32)
MCHC RBC AUTO-ENTMCNC: 33.3 G/DL (ref 30–36)
MCV RBC AUTO: 86.8 FL (ref 80–97)
MONOCYTES ABS-DIF,2141: 0.4 K/UL (ref 0–1.8)
MONOCYTES NFR BLD: 8.3 % (ref 0.1–24)
NEUTROPHILS # BLD: 63.8 % (ref 37–92)
NEUTROPHILS ABS,2156: 3 K/UL (ref 2–7.8)
PLATELET # BLD AUTO: 214 K/UL (ref 140–440)
PMV BLD AUTO: 8.5 FL
POTASSIUM SERPL-SCNC: 4.6 MMOL/L (ref 3.6–5)
RBC # BLD AUTO: 3.98 M/UL (ref 4.2–6.3)
SODIUM SERPL-SCNC: 147 MMOL/L (ref 137–145)
WBC # BLD AUTO: 4.7 K/UL (ref 4.1–10.9)

## 2019-04-23 NOTE — PATIENT INSTRUCTIONS
High Blood Pressure: Care Instructions  Overview    It's normal for blood pressure to go up and down throughout the day. But if it stays up, you have high blood pressure. Another name for high blood pressure is hypertension. Despite what a lot of people think, high blood pressure usually doesn't cause headaches or make you feel dizzy or lightheaded. It usually has no symptoms. But it does increase your risk of stroke, heart attack, and other problems. You and your doctor will talk about your risks of these problems based on your blood pressure. Your doctor will give you a goal for your blood pressure. Your goal will be based on your health and your age. Lifestyle changes, such as eating healthy and being active, are always important to help lower blood pressure. You might also take medicine to reach your blood pressure goal.  Follow-up care is a key part of your treatment and safety. Be sure to make and go to all appointments, and call your doctor if you are having problems. It's also a good idea to know your test results and keep a list of the medicines you take. How can you care for yourself at home? Medical treatment  · If you stop taking your medicine, your blood pressure will go back up. You may take one or more types of medicine to lower your blood pressure. Be safe with medicines. Take your medicine exactly as prescribed. Call your doctor if you think you are having a problem with your medicine. · Talk to your doctor before you start taking aspirin every day. Aspirin can help certain people lower their risk of a heart attack or stroke. But taking aspirin isn't right for everyone, because it can cause serious bleeding. · See your doctor regularly. You may need to see the doctor more often at first or until your blood pressure comes down. · If you are taking blood pressure medicine, talk to your doctor before you take decongestants or anti-inflammatory medicine, such as ibuprofen.  Some of these medicines can raise blood pressure. · Learn how to check your blood pressure at home. Lifestyle changes  · Stay at a healthy weight. This is especially important if you put on weight around the waist. Losing even 10 pounds can help you lower your blood pressure. · If your doctor recommends it, get more exercise. Walking is a good choice. Bit by bit, increase the amount you walk every day. Try for at least 30 minutes on most days of the week. You also may want to swim, bike, or do other activities. · Avoid or limit alcohol. Talk to your doctor about whether you can drink any alcohol. · Try to limit how much sodium you eat to less than 2,300 milligrams (mg) a day. Your doctor may ask you to try to eat less than 1,500 mg a day. · Eat plenty of fruits (such as bananas and oranges), vegetables, legumes, whole grains, and low-fat dairy products. · Lower the amount of saturated fat in your diet. Saturated fat is found in animal products such as milk, cheese, and meat. Limiting these foods may help you lose weight and also lower your risk for heart disease. · Do not smoke. Smoking increases your risk for heart attack and stroke. If you need help quitting, talk to your doctor about stop-smoking programs and medicines. These can increase your chances of quitting for good. When should you call for help? Call 911 anytime you think you may need emergency care. This may mean having symptoms that suggest that your blood pressure is causing a serious heart or blood vessel problem. Your blood pressure may be over 180/120.   For example, call 911 if:    · You have symptoms of a heart attack. These may include:  ? Chest pain or pressure, or a strange feeling in the chest.  ? Sweating. ? Shortness of breath. ? Nausea or vomiting. ? Pain, pressure, or a strange feeling in the back, neck, jaw, or upper belly or in one or both shoulders or arms. ? Lightheadedness or sudden weakness.   ? A fast or irregular heartbeat.     · You have symptoms of a stroke. These may include:  ? Sudden numbness, tingling, weakness, or loss of movement in your face, arm, or leg, especially on only one side of your body. ? Sudden vision changes. ? Sudden trouble speaking. ? Sudden confusion or trouble understanding simple statements. ? Sudden problems with walking or balance. ? A sudden, severe headache that is different from past headaches.     · You have severe back or belly pain.    Do not wait until your blood pressure comes down on its own. Get help right away.   Call your doctor now or seek immediate care if:    · Your blood pressure is much higher than normal (such as 180/120 or higher), but you don't have symptoms.     · You think high blood pressure is causing symptoms, such as:  ? Severe headache.  ? Blurry vision.    Watch closely for changes in your health, and be sure to contact your doctor if:    · Your blood pressure measures higher than your doctor recommends at least 2 times. That means the top number is higher or the bottom number is higher, or both.     · You think you may be having side effects from your blood pressure medicine. Where can you learn more? Go to http://jean pierre-shanae.info/. Enter A026 in the search box to learn more about \"High Blood Pressure: Care Instructions. \"  Current as of: July 22, 2018  Content Version: 11.9  © 3660-0676 [employer]. Care instructions adapted under license by [employer] (which disclaims liability or warranty for this information). If you have questions about a medical condition or this instruction, always ask your healthcare professional. [place] [address] any warranty or liability for your use of this information.

## 2019-04-23 NOTE — PROGRESS NOTES
Subjective:  Ms. Alejandrina Carrasco is a pleasant 71year old lady who comes in today for a six month follow up of her medical problems. She has no new complaints. 1. Hypertension, managed effectively on Lisinopril/HCTZ 10/12.5 mg daily. Denies any headaches, dizziness or blurred vision. Denies chest pain or palpitations. Denies shortness of breath, cough, wheezing, PND or orthopnea. Does have chronic ankle edema. 2. GERD, managed on Aciphex 20 mg daily. She had an endoscopy by Dr. Janny Gregg back in February of 2018 that was normal.  In addition, at that time she also had a colonoscopy that was entirely normal.  3. Hyperlipidemia, managed on Simvastatin. Denies joint pain or muscle aches. 4. Urinary incontinence, under the care of Dr. Taylor Werner at [facility]. She is managed on Myrbetriq. She did have a pelvic and pap through the [facility] last year. 5. Morbid obesity. She has continued to struggle with her weight. In the past she has declined possibility of bariatric surgery. Past Medical History:   Diagnosis Date    Abdominal pain 8/23/2017    Annual physical exam 8/23/2017    Arthritis     Chronic pain     Contact dermatitis due to cosmetics 8/23/2017    Cough 8/23/2017    Degenerative joint disease 8/23/2017    Dietary counseling 8/23/2017    DJD (degenerative joint disease) of knee 8/23/2017    Gallstones 8/23/2017    Gastroesophageal reflux disease with hiatal hernia 8/23/2017    GERD (gastroesophageal reflux disease)     Hip pain, right 8/23/2017    History of transfusion 8/23/2017    HTN (hypertension) 8/23/2017    Hypercholesteremia     Hyperlipidemia 8/23/2017    Hypertension     Hypokalemia 8/23/2017    Left thyroid nodule 8/23/2017    Leg swelling 8/23/2017    Lower abdominal pain 8/23/2017    Lumbar disc disease 8/23/2017    Menopause 8/23/2017    Morbid obesity ([address] 8/23/2017    Obesity (BMI 30-39. 9) 8/23/2017    Overactive bladder     Rash 8/23/2017    Rhinitis, allergic 8/23/2017    Screening for diabetes mellitus (DM) 8/23/2017    Screening for hypothyroidism 8/23/2017    Swelling of both lower extremities 8/23/2017    Tinea corporis 8/23/2017    Urinary incontinence 8/23/2017    Vitamin D deficiency 8/23/2017     Past Surgical History:   Procedure Laterality Date    ABDOMEN SURGERY PROC UNLISTED      HERNIA    COLONOSCOPY N/A 2/9/2018    COLONOSCOPY performed by Melissa Clements MD at [facility]  11/12/2014         HX CATARACT REMOVAL Right     HX HEENT      NODULES-VOCAL CORD    HX ORTHOPAEDIC  2004    ELIGIO KNEE REPLACEMENT    HX TONSILLECTOMY  AS CHILD    HX TUBAL LIGATION         Current Outpatient Medications on File Prior to Visit   Medication Sig Dispense Refill    simvastatin (ZOCOR) 40 mg tablet TAKE 1 TABLET DAILY 30 Tab 0    diclofenac (VOLTAREN) 1 % gel Apply  to affected area four (4) times daily.  lisinopril-hydroCHLOROthiazide (PRINZIDE, ZESTORETIC) 10-12.5 mg per tablet Take 1 Tab by mouth daily. 90 Tab 3    potassium chloride SA (MICRO-K) 10 mEq capsule Take 1 Cap by mouth two (2) times a day. 180 Cap 3    RABEprazole (ACIPHEX) 20 mg tablet Take 1 Tab by mouth daily. 90 Tab 3    biotin (VITAMIN B7) 5 mg capsule Take 10 mg by mouth.  clotrimazole-betamethasone (LOTRISONE) topical cream Apply  to affected area two (2) times a day.  mirabegron ER (MYRBETRIQ) 25 mg ER tablet Take 25 mg by mouth daily.  ERGOCALCIFEROL, VITAMIN D2, (VITAMIN D2 PO) Take  by mouth.  omega-3 fatty acids-vitamin e (FISH OIL) 1,000 mg cap Take 1 Cap by mouth daily. No current facility-administered medications on file prior to visit. Allergies   Allergen Reactions    Adhesive Tape-Silicones Unknown (comments)     Physical Examination:  GENERAL:  Pleasant, morbidly obese lady in no acute distress. She is alert and oriented. VITALS:  BP: 126/84. P: 86.  R: 17.  T: 97.8.   O2 sat: 99%. WT: 389 lbs, which is down by almost 10 lbs since her last visit. HEENT:  Normocephalic, atraumatic. NECK:  Supple without adenopathy. thyromegaly or carotid bruits. CHEST:  Lungs clear to auscultation, no rales or wheezes. CV:  Heart regular rhythm without murmur. EXTREMITIES:  Trace pitting edema in both ankles. No calf tenderness. Distal pulses were present. Impression:  1. Hypertension, stable. 2. Hyperlipidemia. 3. Morbid obesity. 4. GERD. 5. Urinary incontinence. Plan:  1. She will continue with her current regimen and I will see her back in six months for updated H&P and Medicare wellness. Today we did get a CBC and basic metabolic and I will call her as soon as I have results. 2. Once again we discussed importance of prudent diet, mild exercise and certainly weight loss to keep BMI within acceptable level.

## 2019-04-23 NOTE — PROGRESS NOTES
M[facility]  Identified pt with two pt identifiers(name and ). Chief Complaint   Patient presents with    Hypertension    GERD    Cholesterol Problem    Referral Follow Up       1. Have you been to the ER, urgent care clinic since your last visit? Hospitalized since your last visit? no    2. Have you seen or consulted any other health care providers outside of the [address] [place] [address] since your last visit? Include any pap smears or colon screening. Yes, Dr Ольга Olvera, [facility], 2019      Medication reconciliation up to date and corrected with patient at this time. Today's provider has been notified of reason for visit, vitals and flowsheets obtained on patients. Reviewed record in preparation for visit, huddled with provider and have obtained necessary documentation. Depression Risk Factor Screening:     3 most recent PHQ Screens 10/22/2018   Little interest or pleasure in doing things Not at all   Feeling down, depressed, irritable, or hopeless Not at all   Total Score PHQ 2 0       Functional Ability and Level of Safety:     Activities of Daily Living  No flowsheet data found. Fall Risk  No flowsheet data found. Abuse Screen  Abuse Screening Questionnaire 2017   Do you ever feel afraid of your partner? N   Are you in a relationship with someone who physically or mentally threatens you? N   Is it safe for you to go home?  Y           Health Maintenance Due   Topic    DTaP/Tdap/Td series (1 - Tdap)    Shingrix Vaccine Age 50> (1 of 2)    FOBT Q 1 YEAR AGE 50-75     BREAST CANCER SCRN MAMMOGRAM     MEDICARE YEARLY EXAM     GLAUCOMA SCREENING Q2Y     Bone Densitometry (Dexa) Screening     Pneumococcal 65+ years (1 of 2 - PCV13)       Wt Readings from Last 3 Encounters:   19 (!) 389 lb 9.6 oz (176.7 kg)   10/22/18 (!) 401 lb (181.9 kg)   18 (!) 365 lb 4 oz (165.7 kg)     Temp Readings from Last 3 Encounters:   19 97.8 °F (36.6 °C) (Oral) 02/09/18 97.6 °F (36.4 °C)   09/27/17 99 °F (37.2 °C) (Oral)     BP Readings from Last 3 Encounters:   04/23/19 126/84   10/22/18 136/83   02/09/18 146/64     Pulse Readings from Last 3 Encounters:   04/23/19 86   10/22/18 81   02/09/18 84     Vitals:    04/23/19 1015   BP: 126/84   Pulse: 86   Resp: 17   Temp: 97.8 °F (36.6 °C)   TempSrc: Oral   SpO2: 99%   Weight: (!) 389 lb 9.6 oz (176.7 kg)   Height: 5' 5\" (1.651 m)         Patient Care Team   Patient Care Team:  Juancarlos Cardona NP as PCP - General (Nurse Practitioner)  Rikc Bazzi MD as Surgeon (General Surgery)

## 2019-06-03 RX ORDER — RABEPRAZOLE SODIUM 20 MG/1
TABLET, DELAYED RELEASE ORAL
Qty: 90 TAB | Refills: 3 | Status: SHIPPED | OUTPATIENT
Start: 2019-06-03 | End: 2019-08-23 | Stop reason: SDUPTHER

## 2019-06-10 RX ORDER — POTASSIUM CHLORIDE 750 MG/1
CAPSULE, EXTENDED RELEASE ORAL
Qty: 180 CAP | Refills: 3 | Status: SHIPPED | OUTPATIENT
Start: 2019-06-10 | End: 2019-08-23 | Stop reason: SDUPTHER

## 2019-06-20 RX ORDER — LISINOPRIL AND HYDROCHLOROTHIAZIDE 10; 12.5 MG/1; MG/1
TABLET ORAL
Qty: 90 TAB | Refills: 3 | Status: SHIPPED | OUTPATIENT
Start: 2019-06-20 | End: 2019-08-23 | Stop reason: SDUPTHER

## 2019-08-23 RX ORDER — RABEPRAZOLE SODIUM 20 MG/1
TABLET, DELAYED RELEASE ORAL
Qty: 30 TAB | Refills: 0 | Status: SHIPPED | OUTPATIENT
Start: 2019-08-23 | End: 2019-09-17 | Stop reason: SDUPTHER

## 2019-08-23 RX ORDER — POTASSIUM CHLORIDE 750 MG/1
CAPSULE, EXTENDED RELEASE ORAL
Qty: 60 CAP | Refills: 0 | Status: SHIPPED | OUTPATIENT
Start: 2019-08-23 | End: 2019-09-17 | Stop reason: SDUPTHER

## 2019-08-23 RX ORDER — LISINOPRIL AND HYDROCHLOROTHIAZIDE 10; 12.5 MG/1; MG/1
TABLET ORAL
Qty: 30 TAB | Refills: 0 | Status: SHIPPED | OUTPATIENT
Start: 2019-08-23 | End: 2019-09-17 | Stop reason: SDUPTHER

## 2019-08-23 RX ORDER — RABEPRAZOLE SODIUM 20 MG/1
TABLET, DELAYED RELEASE ORAL
Qty: 90 TAB | Refills: 3 | Status: SHIPPED | OUTPATIENT
Start: 2019-08-23 | End: 2019-09-17 | Stop reason: SDUPTHER

## 2019-08-23 RX ORDER — POTASSIUM CHLORIDE 750 MG/1
CAPSULE, EXTENDED RELEASE ORAL
Qty: 180 CAP | Refills: 3 | Status: SHIPPED | OUTPATIENT
Start: 2019-08-23 | End: 2019-09-17 | Stop reason: SDUPTHER

## 2019-08-23 RX ORDER — LISINOPRIL AND HYDROCHLOROTHIAZIDE 10; 12.5 MG/1; MG/1
TABLET ORAL
Qty: 90 TAB | Refills: 3 | Status: SHIPPED | OUTPATIENT
Start: 2019-08-23 | End: 2019-09-17 | Stop reason: SDUPTHER

## 2019-08-23 NOTE — TELEPHONE ENCOUNTER
Patient requests a 30 day supply of these medications [facility] her until her supply from [address] can be sent to her. The prescriptions ordered in June were never shipped to her and [facility] has no orders in their system.     Requested Prescriptions     Pending Prescriptions Disp Refills    potassium chloride SA (MICRO-K) 10 mEq capsule 60 Cap 0     Sig: TAKE 2 CAPSULES BY MOUTH DAILY    RABEprazole (ACIPHEX) 20 mg tablet 30 Tab 0     Sig: TAKE 1 TABLET BY MOUTH DAILY    lisinopril-hydroCHLOROthiazide (PRINZIDE, ZESTORETIC) 10-12.5 mg per tablet 30 Tab 0     Sig: TAKE 1 TABLET BY MOUTH DAILY

## 2019-08-23 NOTE — TELEPHONE ENCOUNTER
PCP: Deondre Mayberry NP    Last appt: 4/23/2019  Future Appointments   Date Time Provider Brennon Duke   10/29/2019  8:30 AM Ramirez Snyder NP [facility]       Requested Prescriptions     Pending Prescriptions Disp Refills    lisinopril-hydroCHLOROthiazide (PRINZIDE, ZESTORETIC) 10-12.5 mg per tablet 90 Tab 3     Sig: TAKE 1 TABLET BY MOUTH DAILY    potassium chloride SA (MICRO-K) 10 mEq capsule 180 Cap 3     Sig: TAKE 2 CAPSULES BY MOUTH DAILY    RABEprazole (ACIPHEX) 20 mg tablet 90 Tab 3     Sig: TAKE 1 TABLET BY MOUTH DAILY       Prior labs and Blood pressures:  BP Readings from Last 3 Encounters:   04/23/19 126/84   10/22/18 136/83   02/09/18 146/64     Lab Results   Component Value Date/Time    Sodium 147 (H) 04/23/2019 10:44 AM    Potassium 4.6 04/23/2019 10:44 AM    Chloride 106 04/23/2019 10:44 AM    CO2 28.0 04/23/2019 10:44 AM    Anion gap 13 04/23/2019 10:44 AM    Glucose 82 04/23/2019 10:44 AM    BUN 20.0 (H) 04/23/2019 10:44 AM    Creatinine 0.6 (L) 04/23/2019 10:44 AM    BUN/Creatinine ratio 24 10/22/2018 11:20 AM    GFR est AA >60 04/23/2019 10:44 AM    GFR est non-AA >60 04/23/2019 10:44 AM    Calcium 9.5 04/23/2019 10:44 AM     Lab Results   Component Value Date/Time    Hemoglobin A1c 5.5 10/22/2018 11:20 AM     Lab Results   Component Value Date/Time    Cholesterol, total 143 10/22/2018 11:20 AM    HDL Cholesterol 45 10/22/2018 11:20 AM    LDL, calculated 84 10/22/2018 11:20 AM    VLDL, calculated 14 10/22/2018 11:20 AM    Triglyceride 68 10/22/2018 11:20 AM     Lab Results   Component Value Date/Time    VITAMIN D, 25-HYDROXY 29.6 (L) 10/22/2018 11:20 AM       Lab Results   Component Value Date/Time    TSH 2.570 10/22/2018 11:20 AM

## 2019-08-23 NOTE — TELEPHONE ENCOUNTER
Patient would like to have enough of each of the [facility] to [facility] her [facility] until [facility]   She would like it sent to [facility] on [address] and [address]

## 2019-09-17 RX ORDER — LISINOPRIL AND HYDROCHLOROTHIAZIDE 10; 12.5 MG/1; MG/1
TABLET ORAL
Qty: 90 TAB | Refills: 3 | Status: SHIPPED | OUTPATIENT
Start: 2019-09-17 | End: 2019-09-20 | Stop reason: SDUPTHER

## 2019-09-17 RX ORDER — POTASSIUM CHLORIDE 750 MG/1
CAPSULE, EXTENDED RELEASE ORAL
Qty: 180 CAP | Refills: 3 | Status: SHIPPED | OUTPATIENT
Start: 2019-09-17 | End: 2019-09-20 | Stop reason: SDUPTHER

## 2019-09-17 RX ORDER — RABEPRAZOLE SODIUM 20 MG/1
TABLET, DELAYED RELEASE ORAL
Qty: 90 TAB | Refills: 3 | Status: SHIPPED | OUTPATIENT
Start: 2019-09-17 | End: 2019-09-20 | Stop reason: SDUPTHER

## 2019-09-17 NOTE — TELEPHONE ENCOUNTER
Requested Prescriptions     Pending Prescriptions Disp Refills    potassium chloride SA (MICRO-K) 10 mEq capsule 180 Cap 3     Sig: TAKE 2 CAPSULES BY MOUTH DAILY    lisinopril-hydroCHLOROthiazide (PRINZIDE, ZESTORETIC) 10-12.5 mg per tablet 90 Tab 3     Sig: TAKE 1 TABLET BY MOUTH DAILY    RABEprazole (ACIPHEX) 20 mg tablet 90 Tab 3     Sig: TAKE 1 TABLET BY MOUTH DAILY

## 2019-09-17 NOTE — TELEPHONE ENCOUNTER
PCP: Horacio Chadwick NP    Last appt: 4/23/2019  Future Appointments   Date Time Provider Brennon Duke   10/29/2019  8:30 AM Shena Snyder NP [facility]       Requested Prescriptions     Pending Prescriptions Disp Refills    potassium chloride SA (MICRO-K) 10 mEq capsule 180 Cap 3     Sig: TAKE 2 CAPSULES BY MOUTH DAILY    lisinopril-hydroCHLOROthiazide (PRINZIDE, ZESTORETIC) 10-12.5 mg per tablet 90 Tab 3     Sig: TAKE 1 TABLET BY MOUTH DAILY    RABEprazole (ACIPHEX) 20 mg tablet 90 Tab 3     Sig: TAKE 1 TABLET BY MOUTH DAILY       Prior labs and Blood pressures:  BP Readings from Last 3 Encounters:   04/23/19 126/84   10/22/18 136/83   02/09/18 146/64     Lab Results   Component Value Date/Time    Sodium 147 (H) 04/23/2019 10:44 AM    Potassium 4.6 04/23/2019 10:44 AM    Chloride 106 04/23/2019 10:44 AM    CO2 28.0 04/23/2019 10:44 AM    Anion gap 13 04/23/2019 10:44 AM    Glucose 82 04/23/2019 10:44 AM    BUN 20.0 (H) 04/23/2019 10:44 AM    Creatinine 0.6 (L) 04/23/2019 10:44 AM    BUN/Creatinine ratio 24 10/22/2018 11:20 AM    GFR est AA >60 04/23/2019 10:44 AM    GFR est non-AA >60 04/23/2019 10:44 AM    Calcium 9.5 04/23/2019 10:44 AM     Lab Results   Component Value Date/Time    Hemoglobin A1c 5.5 10/22/2018 11:20 AM     Lab Results   Component Value Date/Time    Cholesterol, total 143 10/22/2018 11:20 AM    HDL Cholesterol 45 10/22/2018 11:20 AM    LDL, calculated 84 10/22/2018 11:20 AM    VLDL, calculated 14 10/22/2018 11:20 AM    Triglyceride 68 10/22/2018 11:20 AM     Lab Results   Component Value Date/Time    VITAMIN D, 25-HYDROXY 29.6 (L) 10/22/2018 11:20 AM       Lab Results   Component Value Date/Time    TSH 2.570 10/22/2018 11:20 AM

## 2019-09-23 RX ORDER — RABEPRAZOLE SODIUM 20 MG/1
TABLET, DELAYED RELEASE ORAL
Qty: 90 TAB | Refills: 3 | Status: SHIPPED | OUTPATIENT
Start: 2019-09-23 | End: 2019-09-30 | Stop reason: SDUPTHER

## 2019-09-23 RX ORDER — LISINOPRIL AND HYDROCHLOROTHIAZIDE 10; 12.5 MG/1; MG/1
TABLET ORAL
Qty: 90 TAB | Refills: 3 | Status: SHIPPED | OUTPATIENT
Start: 2019-09-23 | End: 2019-09-30 | Stop reason: SDUPTHER

## 2019-09-23 RX ORDER — POTASSIUM CHLORIDE 750 MG/1
CAPSULE, EXTENDED RELEASE ORAL
Qty: 180 CAP | Refills: 3 | Status: SHIPPED | OUTPATIENT
Start: 2019-09-23 | End: 2019-09-30 | Stop reason: SDUPTHER

## 2019-09-23 NOTE — TELEPHONE ENCOUNTER
PCP: Eleanor Alva NP    Last appt: 4/23/2019  Future Appointments   Date Time Provider Brennon Duke   10/29/2019  8:30 AM Greta Snyder NP [facility]       Requested Prescriptions     Pending Prescriptions Disp Refills    lisinopril-hydroCHLOROthiazide (PRINZIDE, ZESTORETIC) 10-12.5 mg per tablet 90 Tab 3     Sig: TAKE 1 TABLET BY MOUTH DAILY    RABEprazole (ACIPHEX) 20 mg tablet 90 Tab 3     Sig: TAKE 1 TABLET BY MOUTH DAILY    potassium chloride SA (MICRO-K) 10 mEq capsule 180 Cap 3     Sig: TAKE 2 CAPSULES BY MOUTH DAILY       Prior labs and Blood pressures:  BP Readings from Last 3 Encounters:   04/23/19 126/84   10/22/18 136/83   02/09/18 146/64     Lab Results   Component Value Date/Time    Sodium 147 (H) 04/23/2019 10:44 AM    Potassium 4.6 04/23/2019 10:44 AM    Chloride 106 04/23/2019 10:44 AM    CO2 28.0 04/23/2019 10:44 AM    Anion gap 13 04/23/2019 10:44 AM    Glucose 82 04/23/2019 10:44 AM    BUN 20.0 (H) 04/23/2019 10:44 AM    Creatinine 0.6 (L) 04/23/2019 10:44 AM    BUN/Creatinine ratio 24 10/22/2018 11:20 AM    GFR est AA >60 04/23/2019 10:44 AM    GFR est non-AA >60 04/23/2019 10:44 AM    Calcium 9.5 04/23/2019 10:44 AM     Lab Results   Component Value Date/Time    Hemoglobin A1c 5.5 10/22/2018 11:20 AM     Lab Results   Component Value Date/Time    Cholesterol, total 143 10/22/2018 11:20 AM    HDL Cholesterol 45 10/22/2018 11:20 AM    LDL, calculated 84 10/22/2018 11:20 AM    VLDL, calculated 14 10/22/2018 11:20 AM    Triglyceride 68 10/22/2018 11:20 AM     Lab Results   Component Value Date/Time    VITAMIN D, 25-HYDROXY 29.6 (L) 10/22/2018 11:20 AM       Lab Results   Component Value Date/Time    TSH 2.570 10/22/2018 11:20 AM

## 2019-09-25 PROBLEM — Z13.29 SCREENING FOR HYPOTHYROIDISM: Status: RESOLVED | Noted: 2017-08-23 | Resolved: 2019-09-25

## 2019-09-25 PROBLEM — Z00.00 ANNUAL PHYSICAL EXAM: Status: RESOLVED | Noted: 2017-08-23 | Resolved: 2019-09-25

## 2019-09-25 PROBLEM — Z13.1 SCREENING FOR DIABETES MELLITUS (DM): Status: RESOLVED | Noted: 2017-08-23 | Resolved: 2019-09-25

## 2019-09-30 RX ORDER — RABEPRAZOLE SODIUM 20 MG/1
TABLET, DELAYED RELEASE ORAL
Qty: 90 TAB | Refills: 3 | Status: SHIPPED | OUTPATIENT
Start: 2019-09-30 | End: 2019-09-30 | Stop reason: SDUPTHER

## 2019-09-30 RX ORDER — LISINOPRIL AND HYDROCHLOROTHIAZIDE 10; 12.5 MG/1; MG/1
TABLET ORAL
Qty: 30 TAB | Refills: 0 | Status: SHIPPED | OUTPATIENT
Start: 2019-09-30 | End: 2019-10-09 | Stop reason: SDUPTHER

## 2019-09-30 RX ORDER — RABEPRAZOLE SODIUM 20 MG/1
TABLET, DELAYED RELEASE ORAL
Qty: 90 TAB | Refills: 3 | Status: SHIPPED | OUTPATIENT
Start: 2019-09-30 | End: 2020-11-11 | Stop reason: SDUPTHER

## 2019-09-30 RX ORDER — POTASSIUM CHLORIDE 750 MG/1
CAPSULE, EXTENDED RELEASE ORAL
Qty: 60 CAP | Refills: 0 | Status: SHIPPED | OUTPATIENT
Start: 2019-09-30 | End: 2020-09-25

## 2019-09-30 NOTE — TELEPHONE ENCOUNTER
Patient wants to know why she can't get her medications refilled via [facility] or at the pharmacy. She is now out of meds.     Call back [phone]

## 2019-09-30 NOTE — TELEPHONE ENCOUNTER
PCP: Wilber Byrne NP    Last appt: 4/23/2019  Future Appointments   Date Time Provider Brennon Duke   10/29/2019  8:30 AM Katlin Snyder NP [facility]       Requested Prescriptions     Pending Prescriptions Disp Refills    RABEprazole (ACIPHEX) 20 mg tablet 90 Tab 3     Sig: TAKE 1 TABLET BY MOUTH DAILY       Prior labs and Blood pressures:  BP Readings from Last 3 Encounters:   04/23/19 126/84   10/22/18 136/83   02/09/18 146/64     Lab Results   Component Value Date/Time    Sodium 147 (H) 04/23/2019 10:44 AM    Potassium 4.6 04/23/2019 10:44 AM    Chloride 106 04/23/2019 10:44 AM    CO2 28.0 04/23/2019 10:44 AM    Anion gap 13 04/23/2019 10:44 AM    Glucose 82 04/23/2019 10:44 AM    BUN 20.0 (H) 04/23/2019 10:44 AM    Creatinine 0.6 (L) 04/23/2019 10:44 AM    BUN/Creatinine ratio 24 10/22/2018 11:20 AM    GFR est AA >60 04/23/2019 10:44 AM    GFR est non-AA >60 04/23/2019 10:44 AM    Calcium 9.5 04/23/2019 10:44 AM     Lab Results   Component Value Date/Time    Hemoglobin A1c 5.5 10/22/2018 11:20 AM     Lab Results   Component Value Date/Time    Cholesterol, total 143 10/22/2018 11:20 AM    HDL Cholesterol 45 10/22/2018 11:20 AM    LDL, calculated 84 10/22/2018 11:20 AM    VLDL, calculated 14 10/22/2018 11:20 AM    Triglyceride 68 10/22/2018 11:20 AM     Lab Results   Component Value Date/Time    VITAMIN D, 25-HYDROXY 29.6 (L) 10/22/2018 11:20 AM       Lab Results   Component Value Date/Time    TSH 2.570 10/22/2018 11:20 AM

## 2019-09-30 NOTE — TELEPHONE ENCOUNTER
Patient needs a 30 day supply of Lisinopril-HCTZ and Potassium Chloride sent to the local pharmacy to hold her until receiving her order from mail order. Also, she needs Rabeprazole sent to [facility] and will  a 30 day supply at her local pharmacy to last her until she receives her order from mail order.     Requested Prescriptions     Pending Prescriptions Disp Refills    RABEprazole (ACIPHEX) 20 mg tablet 90 Tab 3     Sig: TAKE 1 TABLET BY MOUTH DAILY    lisinopril-hydroCHLOROthiazide (PRINZIDE, ZESTORETIC) 10-12.5 mg per tablet 30 Tab 0     Sig: TAKE 1 TABLET BY MOUTH DAILY    potassium chloride SA (MICRO-K) 10 mEq capsule 60 Cap 0     Sig: TAKE 2 CAPSULES BY MOUTH DAILY

## 2019-10-10 RX ORDER — LISINOPRIL AND HYDROCHLOROTHIAZIDE 10; 12.5 MG/1; MG/1
TABLET ORAL
Qty: 30 TAB | Refills: 0 | Status: SHIPPED | OUTPATIENT
Start: 2019-10-10 | End: 2020-09-25

## 2019-11-01 RX ORDER — SIMVASTATIN 40 MG/1
TABLET, FILM COATED ORAL
Qty: 90 TAB | Refills: 0 | Status: SHIPPED | OUTPATIENT
Start: 2019-11-01 | End: 2019-12-30 | Stop reason: SDUPTHER

## 2019-11-01 NOTE — TELEPHONE ENCOUNTER
PCP: Héctor Key NP    Last appt: 4/23/2019  Future Appointments   Date Time Provider Brennon Duke   11/13/2019  2:00 PM Jonny Snyder NP [facility]       Requested Prescriptions     Pending Prescriptions Disp Refills    simvastatin (ZOCOR) 40 mg tablet [Pharmacy Med Name: SIMVASTATIN TABS 40MG] 90 Tab 4     Sig: TAKE 1 TABLET DAILY

## 2019-11-13 ENCOUNTER — OFFICE VISIT (OUTPATIENT)
Dept: INTERNAL MEDICINE CLINIC | Age: 64
End: 2019-11-13

## 2019-11-13 VITALS
WEIGHT: 293 LBS | HEIGHT: 65 IN | SYSTOLIC BLOOD PRESSURE: 132 MMHG | RESPIRATION RATE: 18 BRPM | BODY MASS INDEX: 48.82 KG/M2 | TEMPERATURE: 97.9 F | OXYGEN SATURATION: 99 % | DIASTOLIC BLOOD PRESSURE: 76 MMHG | HEART RATE: 88 BPM

## 2019-11-13 DIAGNOSIS — K21.9 GASTROESOPHAGEAL REFLUX DISEASE WITH HIATAL HERNIA: ICD-10-CM

## 2019-11-13 DIAGNOSIS — E66.01 CLASS 3 SEVERE OBESITY DUE TO EXCESS CALORIES WITHOUT SERIOUS COMORBIDITY WITH BODY MASS INDEX (BMI) OF 60.0 TO 69.9 IN ADULT (HCC): ICD-10-CM

## 2019-11-13 DIAGNOSIS — R53.83 FATIGUE, UNSPECIFIED TYPE: ICD-10-CM

## 2019-11-13 DIAGNOSIS — E55.9 VITAMIN D DEFICIENCY: ICD-10-CM

## 2019-11-13 DIAGNOSIS — I10 ESSENTIAL HYPERTENSION: ICD-10-CM

## 2019-11-13 DIAGNOSIS — K44.9 GASTROESOPHAGEAL REFLUX DISEASE WITH HIATAL HERNIA: ICD-10-CM

## 2019-11-13 DIAGNOSIS — R73.9 HYPERGLYCEMIA: ICD-10-CM

## 2019-11-13 DIAGNOSIS — Z00.00 WELCOME TO MEDICARE PREVENTIVE VISIT: Primary | ICD-10-CM

## 2019-11-13 DIAGNOSIS — E78.2 MIXED HYPERLIPIDEMIA: ICD-10-CM

## 2019-11-13 LAB
A-G RATIO,AGRAT: 1.2 RATIO
ALBUMIN SERPL-MCNC: 4.3 G/DL (ref 3.9–5.4)
ALP SERPL-CCNC: 112 U/L (ref 38–126)
ALT SERPL-CCNC: 16 U/L (ref 0–35)
ANION GAP SERPL CALC-SCNC: 10 MMOL/L
AST SERPL W P-5'-P-CCNC: 18 U/L (ref 14–36)
BILIRUB SERPL-MCNC: 0.9 MG/DL (ref 0.2–1.3)
BILIRUB UR QL: NEGATIVE
BUN SERPL-MCNC: 14 MG/DL (ref 7–17)
BUN/CREATININE RATIO,BUCR: 23 RATIO
CALCIUM SERPL-MCNC: 9.8 MG/DL (ref 8.4–10.2)
CHLORIDE SERPL-SCNC: 106 MMOL/L (ref 98–107)
CHOL/HDL RATIO,CHHD: 3 RATIO (ref 0–4)
CHOLEST SERPL-MCNC: 139 MG/DL (ref 0–200)
CLARITY: CLEAR
CO2 SERPL-SCNC: 29 MMOL/L (ref 22–32)
COLOR UR: ABNORMAL
CREAT SERPL-MCNC: 0.6 MG/DL (ref 0.7–1.2)
GLOBULIN,GLOB: 3.5
GLUCOSE 24H UR-MRATE: NEGATIVE G/(24.H)
GLUCOSE SERPL-MCNC: 94 MG/DL (ref 65–105)
HDLC SERPL-MCNC: 47 MG/DL (ref 35–130)
HGB UR QL STRIP: ABNORMAL
KETONES UR QL STRIP.AUTO: NEGATIVE
LDL/HDL RATIO,LDHD: 2 RATIO
LDLC SERPL CALC-MCNC: 81 MG/DL (ref 0–130)
LEUKOCYTE ESTERASE: NEGATIVE
NITRITE UR QL STRIP.AUTO: NEGATIVE
PH UR STRIP: 6 [PH] (ref 5–7)
POTASSIUM SERPL-SCNC: 4.4 MMOL/L (ref 3.6–5)
PROT SERPL-MCNC: 7.8 G/DL (ref 6.3–8.2)
PROT UR STRIP-MCNC: NEGATIVE MG/DL
RBC #/AREA URNS HPF: 0 #/HPF
SODIUM SERPL-SCNC: 145 MMOL/L (ref 137–145)
SP GR UR REFRACTOMETRY: 1.02 (ref 1–1.03)
SQUAMOUS EPITHELIAL CELLS: ABNORMAL
TRIGL SERPL-MCNC: 57 MG/DL (ref 0–200)
UROBILINOGEN UR QL STRIP.AUTO: NEGATIVE
VLDLC SERPL CALC-MCNC: 11 MG/DL
WBC URNS QL MICRO: ABNORMAL #/HPF

## 2019-11-13 NOTE — PROGRESS NOTES
Jayro Cordero is a 71 y.o. female     Chief Complaint   Patient presents with    Complete Physical       Visit Vitals  /76 (BP 1 Location: Left arm, BP Patient Position: Prone)   Pulse 88   Temp 97.9 °F (36.6 °C) (Oral)   Resp 18   Ht 5' 5\" (1.651 m)   Wt (!) 398 lb 6.4 oz (180.7 kg)   SpO2 99%   BMI 66.30 kg/m²       Health Maintenance Due   Topic Date Due    DTaP/Tdap/Td series (1 - Tdap) 03/14/1975    Shingrix Vaccine Age 50> (1 of 2) 03/14/2004    MEDICARE YEARLY EXAM  10/22/2018    GLAUCOMA SCREENING Q2Y  03/14/2019    Bone Densitometry (Dexa) Screening  03/14/2019    Pneumococcal 65+ years (1 of 1 - PPSV23) 03/14/2019    Influenza Age 9 to Adult  08/01/2019       1. Have you been to the ER, urgent care clinic since your last visit? Hospitalized since your last visit? No    2. Have you seen or consulted any other health care providers outside of the [address] [place] [address] since your last visit? Include any pap smears or colon screening.  No

## 2019-11-13 NOTE — PATIENT INSTRUCTIONS
Medicare Wellness Visit, Female The best way to live healthy is to have a lifestyle where you eat a well-balanced diet, exercise regularly, limit alcohol use, and quit all forms of tobacco/nicotine, if applicable. Regular preventive services are another way to keep healthy. Preventive services (vaccines, screening tests, monitoring & exams) can help personalize your care plan, which helps you manage your own care. Screening tests can find health problems at the earliest stages, when they are easiest to treat. [facility] follows the current, evidence-based guidelines published by the [facility] ([facility]) when recommending preventive services for our patients. Because we follow these guidelines, sometimes recommendations change over time as research supports it. (For example, mammograms used to be recommended annually. Even though Medicare will still pay for an annual mammogram, the newer guidelines recommend a mammogram every two years for women of average risk). Of course, you and your doctor may decide to screen more often for some diseases, based on your risk and your co-morbidities (chronic disease you are already diagnosed with). Preventive services for you include: - Medicare offers their members a free annual wellness visit, which is time for you and your primary care provider to discuss and plan for your preventive service needs. Take advantage of this benefit every year! 
-All adults over the age of 72 should receive the recommended pneumonia vaccines. Current USPSTF guidelines recommend a series of two vaccines for the best pneumonia protection.  
-All adults should have a flu vaccine yearly and a tetanus vaccine every 10 years.  
-All adults age 48 and older should receive the shingles vaccines (series of two vaccines). -All adults age 38-68 who are overweight should have a diabetes screening test once every three years. -All adults born between [birth year] and 1966 should be screened once for Hepatitis C. 
-Other screening tests and preventive services for persons with diabetes include: an eye exam to screen for diabetic retinopathy, a kidney function test, a foot exam, and stricter control over your cholesterol.  
-Cardiovascular screening for adults with routine risk involves an electrocardiogram (ECG) at intervals determined by your doctor.  
-Colorectal cancer screenings should be done for adults age 54-65 with no increased risk factors for colorectal cancer. There are a number of acceptable methods of screening for this type of cancer. Each test has its own benefits and drawbacks. Discuss with your doctor what is most appropriate for you during your annual wellness visit. The different tests include: colonoscopy (considered the best screening method), a fecal occult blood test, a fecal DNA test, and sigmoidoscopy. 
 
-A bone mass density test is recommended when a woman turns 65 to screen for osteoporosis. This test is only recommended one time, as a screening. Some providers will use this same test as a disease monitoring tool if you already have osteoporosis. -Breast cancer screenings are recommended every other year for women of normal risk, age 54-69. 
-Cervical cancer screenings for women over age 72 are only recommended with certain risk factors. Here is a list of your current Health Maintenance items (your personalized list of preventive services) with a due date: 
Health Maintenance Due Topic Date Due  
 DTaP/Tdap/Td  (1 - Tdap) 03/14/1975  Shingles Vaccine (1 of 2) 03/14/2004 [employer] Annual Well Visit  10/22/2018  Glaucoma Screening   03/14/2019  Bone Mineral Density   03/14/2019  Pneumococcal Vaccine (1 of 1 - PPSV23) 03/14/2019  Flu Vaccine  08/01/2019 Body Mass Index: Care Instructions Your Care Instructions Body mass index (BMI) can help you see if your weight is raising your risk for health problems. It uses a formula to compare how much you weigh with how tall you are. · A BMI lower than 18.5 is considered underweight. · A BMI between 18.5 and 24.9 is considered healthy. · A BMI between 25 and 29.9 is considered overweight. A BMI of 30 or higher is considered obese. If your BMI is in the normal range, it means that you have a lower risk for weight-related health problems. If your BMI is in the overweight or obese range, you may be at increased risk for weight-related health problems, such as high blood pressure, heart disease, stroke, arthritis or joint pain, and diabetes. If your BMI is in the underweight range, you may be at increased risk for health problems such as fatigue, lower protection (immunity) against illness, muscle loss, bone loss, hair loss, and hormone problems. BMI is just one measure of your risk for weight-related health problems. You may be at higher risk for health problems if you are not active, you eat an unhealthy diet, or you drink too much alcohol or use tobacco products. Follow-up care is a key part of your treatment and safety. Be sure to make and go to all appointments, and call your doctor if you are having problems. It's also a good idea to know your test results and keep a list of the medicines you take. How can you care for yourself at home? · Practice healthy eating habits. This includes eating plenty of fruits, vegetables, whole grains, lean protein, and low-fat dairy. · If your doctor recommends it, get more exercise. Walking is a good choice. Bit by bit, increase the amount you walk every day. Try for at least 30 minutes on most days of the week. · Do not smoke. Smoking can increase your risk for health problems. If you need help quitting, talk to your doctor about stop-smoking programs and medicines. These can increase your chances of quitting for good. · Limit alcohol to 2 drinks a day for men and 1 drink a day for women.  Too much alcohol can cause health problems. If you have a BMI higher than 25 · Your doctor may do other tests to check your risk for weight-related health problems. This may include measuring the distance around your waist. A waist measurement of more than 40 inches in men or 35 inches in women can increase the risk of weight-related health problems. · Talk with your doctor about steps you can take to stay healthy or improve your health. You may need to make lifestyle changes to lose weight and stay healthy, such as changing your diet and getting regular exercise. If you have a BMI lower than 18.5 · Your doctor may do other tests to check your risk for health problems. · Talk with your doctor about steps you can take to stay healthy or improve your health. You may need to make lifestyle changes to gain or maintain weight and stay healthy, such as getting more healthy foods in your diet and doing exercises to build muscle. Where can you learn more? Go to http://jean pierre-shanae.info/. Enter S176 in the search box to learn more about \"Body Mass Index: Care Instructions. \" Current as of: March 28, 2019 Content Version: 12.2 © 9967-5143 [employer]. Care instructions adapted under license by [employer] (which disclaims liability or warranty for this information). If you have questions about a medical condition or this instruction, always ask your healthcare professional. [address] any warranty or liability for your use of this information.

## 2019-11-14 LAB
25(OH)D3 SERPL-MCNC: 27 NG/ML (ref 30–96)
BASOPHILS # BLD AUTO: 0 X10E3/UL (ref 0–0.2)
BASOPHILS NFR BLD AUTO: 0 %
EOSINOPHIL # BLD AUTO: 0.2 X10E3/UL (ref 0–0.4)
EOSINOPHIL NFR BLD AUTO: 4 %
ERYTHROCYTE [DISTWIDTH] IN BLOOD BY AUTOMATED COUNT: 12.8 % (ref 12.3–15.4)
HCT VFR BLD AUTO: 37.2 % (ref 34–46.6)
HGB BLD-MCNC: 11.8 G/DL (ref 11.1–15.9)
IMM GRANULOCYTES # BLD AUTO: 0 X10E3/UL (ref 0–0.1)
IMM GRANULOCYTES NFR BLD AUTO: 0 %
LYMPHOCYTES # BLD AUTO: 1.4 X10E3/UL (ref 0.7–3.1)
LYMPHOCYTES NFR BLD AUTO: 29 %
MCH RBC QN AUTO: 27.6 PG (ref 26.6–33)
MCHC RBC AUTO-ENTMCNC: 31.7 G/DL (ref 31.5–35.7)
MCV RBC AUTO: 87 FL (ref 79–97)
MONOCYTES # BLD AUTO: 0.4 X10E3/UL (ref 0.1–0.9)
MONOCYTES NFR BLD AUTO: 9 %
NEUTROPHILS # BLD AUTO: 2.7 X10E3/UL (ref 1.4–7)
NEUTROPHILS NFR BLD AUTO: 58 %
PLATELET # BLD AUTO: 226 X10E3/UL (ref 150–450)
RBC # BLD AUTO: 4.27 X10E6/UL (ref 3.77–5.28)
T4 FREE SERPL-MCNC: 1.07 NG/DL (ref 0.58–2.3)
TSH SERPL DL<=0.05 MIU/L-ACNC: 1.85 UIU/ML (ref 0.34–5.6)
WBC # BLD AUTO: 4.6 X10E3/UL (ref 3.4–10.8)

## 2019-11-15 LAB — HBA1C MFR BLD HPLC: 5 % (ref 4–5.7)

## 2019-12-30 RX ORDER — SIMVASTATIN 40 MG/1
TABLET, FILM COATED ORAL
Qty: 90 TAB | Refills: 3 | Status: SHIPPED | OUTPATIENT
Start: 2019-12-30 | End: 2020-01-08 | Stop reason: SDUPTHER

## 2019-12-30 NOTE — TELEPHONE ENCOUNTER
Requested Prescriptions     Pending Prescriptions Disp Refills    simvastatin (ZOCOR) 40 mg tablet 90 Tab 3     Sig: Take 1 tab daily       Last Refill: 11-1-19  Last visit:11-13-19

## 2020-01-08 RX ORDER — SIMVASTATIN 40 MG/1
TABLET, FILM COATED ORAL
Qty: 90 TAB | Refills: 3 | Status: SHIPPED | OUTPATIENT
Start: 2020-01-08 | End: 2021-01-18

## 2020-01-08 NOTE — TELEPHONE ENCOUNTER
PCP: Wei Morrison NP    Last appt: 11/13/2019  No future appointments.     Requested Prescriptions     Pending Prescriptions Disp Refills    simvastatin (ZOCOR) 40 mg tablet 90 Tab 3     Sig: Take 1 tab daily       Prior labs and Blood pressures:  BP Readings from Last 3 Encounters:   11/13/19 132/76   04/23/19 126/84   10/22/18 136/83     Lab Results   Component Value Date/Time    Sodium 145 11/13/2019 03:27 PM    Potassium 4.4 11/13/2019 03:27 PM    Chloride 106 11/13/2019 03:27 PM    CO2 29.0 11/13/2019 03:27 PM    Anion gap 10 11/13/2019 03:27 PM    Glucose 94 11/13/2019 03:27 PM    BUN 14.0 11/13/2019 03:27 PM    Creatinine 0.6 (L) 11/13/2019 03:27 PM    BUN/Creatinine ratio 23 11/13/2019 03:27 PM    GFR est AA >60 11/13/2019 03:27 PM    GFR est non-AA >60 11/13/2019 03:27 PM    Calcium 9.8 11/13/2019 03:27 PM     Lab Results   Component Value Date/Time    Hemoglobin A1c 5.0 11/13/2019 03:27 PM     Lab Results   Component Value Date/Time    Cholesterol, total 139 11/13/2019 03:27 PM    HDL Cholesterol 47 11/13/2019 03:27 PM    LDL, calculated 81 11/13/2019 03:27 PM    VLDL, calculated 14 10/22/2018 11:20 AM    VLDL 11 11/13/2019 03:27 PM    Triglyceride 57 11/13/2019 03:27 PM    CHOL/HDL Ratio 3 11/13/2019 03:27 PM     Lab Results   Component Value Date/Time    VITAMIN D, 25-HYDROXY 27 (L) 11/13/2019 03:27 PM       Lab Results   Component Value Date/Time    TSH 2.570 10/22/2018 11:20 AM    TSH, 3rd generation 1.85 11/13/2019 03:27 PM

## 2020-03-19 ENCOUNTER — TELEPHONE (OUTPATIENT)
Dept: INTERNAL MEDICINE CLINIC | Age: 65
End: 2020-03-19

## 2020-03-19 RX ORDER — AZITHROMYCIN 250 MG/1
TABLET, FILM COATED ORAL
Qty: 6 TAB | Refills: 0 | Status: SHIPPED | OUTPATIENT
Start: 2020-03-19 | End: 2022-06-14

## 2020-03-19 NOTE — TELEPHONE ENCOUNTER
Patient called stating she has sinus issue and a cough with no fever would like a prescription sent to pharmacy.

## 2020-09-25 ENCOUNTER — HOSPITAL ENCOUNTER (OUTPATIENT)
Dept: VASCULAR SURGERY | Age: 65
Discharge: HOME | End: 2020-09-25
Attending: ORTHOPAEDIC SURGERY
Payer: MEDICARE

## 2020-09-25 DIAGNOSIS — M79.89 RIGHT LEG SWELLING: ICD-10-CM

## 2020-09-25 PROCEDURE — 93971 EXTREMITY STUDY: CPT

## 2020-09-25 RX ORDER — LISINOPRIL AND HYDROCHLOROTHIAZIDE 10; 12.5 MG/1; MG/1
TABLET ORAL
Qty: 90 TAB | Refills: 3 | Status: SHIPPED | OUTPATIENT
Start: 2020-09-25 | End: 2020-12-29 | Stop reason: SDUPTHER

## 2020-09-25 RX ORDER — POTASSIUM CHLORIDE 750 MG/1
CAPSULE, EXTENDED RELEASE ORAL
Qty: 180 CAP | Refills: 3 | Status: SHIPPED | OUTPATIENT
Start: 2020-09-25 | End: 2022-09-02 | Stop reason: SDUPTHER

## 2020-09-25 NOTE — TELEPHONE ENCOUNTER
PCP: Bonny Townsend NP    Last appt: 11/13/2019    Future Appointments   Date Time Provider Brennon Duke   9/25/2020 11:00 AM VASCULAR ROOM 1 [address] [place] [address]. BONNYEdenCB DELAROSA   11/24/2020 10:00 AM Nadeau-Deckert, Karle Babinski, NP [facility]       Requested Prescriptions     Pending Prescriptions Disp Refills    potassium chloride SA (MICRO-K) 10 mEq capsule [Pharmacy Med Name: POTASSIUM CHLORIDE ER CAPS 10MEQ] 180 Cap 3     Sig: TAKE 2 CAPSULES DAILY    lisinopril-hydroCHLOROthiazide (PRINZIDE, ZESTORETIC) 10-12.5 mg per tablet [Pharmacy Med Name: LISINOPRIL/HCTZ TABS 10/12.5] 90 Tab 3     Sig: TAKE 1 TABLET DAILY       Prior labs and Blood pressures:  BP Readings from Last 3 Encounters:   11/13/19 132/76   04/23/19 126/84   10/22/18 136/83     Lab Results   Component Value Date/Time    Sodium 145 11/13/2019 03:27 PM    Potassium 4.4 11/13/2019 03:27 PM    Chloride 106 11/13/2019 03:27 PM    CO2 29.0 11/13/2019 03:27 PM    Anion gap 10 11/13/2019 03:27 PM    Glucose 94 11/13/2019 03:27 PM    BUN 14.0 11/13/2019 03:27 PM    Creatinine 0.6 (L) 11/13/2019 03:27 PM    BUN/Creatinine ratio 23 11/13/2019 03:27 PM    GFR est AA >60 11/13/2019 03:27 PM    GFR est non-AA >60 11/13/2019 03:27 PM    Calcium 9.8 11/13/2019 03:27 PM     Lab Results   Component Value Date/Time    Hemoglobin A1c 5.0 11/13/2019 03:27 PM     Lab Results   Component Value Date/Time    Cholesterol, total 139 11/13/2019 03:27 PM    HDL Cholesterol 47 11/13/2019 03:27 PM    LDL, calculated 81 11/13/2019 03:27 PM    VLDL, calculated 14 10/22/2018 11:20 AM    VLDL 11 11/13/2019 03:27 PM    Triglyceride 57 11/13/2019 03:27 PM    CHOL/HDL Ratio 3 11/13/2019 03:27 PM     Lab Results   Component Value Date/Time    VITAMIN D, 25-HYDROXY 27 (L) 11/13/2019 03:27 PM       Lab Results   Component Value Date/Time    TSH 2.570 10/22/2018 11:20 AM    TSH, 3rd generation 1.85 11/13/2019 03:27 PM

## 2020-11-11 RX ORDER — RABEPRAZOLE SODIUM 20 MG/1
TABLET, DELAYED RELEASE ORAL
Qty: 90 TAB | Refills: 3 | Status: SHIPPED | OUTPATIENT
Start: 2020-11-11 | End: 2021-03-18 | Stop reason: SDUPTHER

## 2020-11-11 NOTE — TELEPHONE ENCOUNTER
PCP: Ramona Ruth NP    Last appt: 11/13/2019    Future Appointments   Date Time Provider Brennon Duke   11/24/2020 10:00 AM Joie Snyder NP [facility]       Requested Prescriptions     Pending Prescriptions Disp Refills    RABEprazole (ACIPHEX) 20 mg tablet 90 Tab 3     Sig: TAKE 1 TABLET BY MOUTH DAILY       Prior labs and Blood pressures:  BP Readings from Last 3 Encounters:   11/13/19 132/76   04/23/19 126/84   10/22/18 136/83     Lab Results   Component Value Date/Time    Sodium 145 11/13/2019 03:27 PM    Potassium 4.4 11/13/2019 03:27 PM    Chloride 106 11/13/2019 03:27 PM    CO2 29.0 11/13/2019 03:27 PM    Anion gap 10 11/13/2019 03:27 PM    Glucose 94 11/13/2019 03:27 PM    BUN 14.0 11/13/2019 03:27 PM    Creatinine 0.6 (L) 11/13/2019 03:27 PM    BUN/Creatinine ratio 23 11/13/2019 03:27 PM    GFR est AA >60 11/13/2019 03:27 PM    GFR est non-AA >60 11/13/2019 03:27 PM    Calcium 9.8 11/13/2019 03:27 PM     Lab Results   Component Value Date/Time    Hemoglobin A1c 5.0 11/13/2019 03:27 PM     Lab Results   Component Value Date/Time    Cholesterol, total 139 11/13/2019 03:27 PM    HDL Cholesterol 47 11/13/2019 03:27 PM    LDL, calculated 81 11/13/2019 03:27 PM    VLDL, calculated 14 10/22/2018 11:20 AM    VLDL 11 11/13/2019 03:27 PM    Triglyceride 57 11/13/2019 03:27 PM    CHOL/HDL Ratio 3 11/13/2019 03:27 PM     Lab Results   Component Value Date/Time    VITAMIN D, 25-HYDROXY 27 (L) 11/13/2019 03:27 PM       Lab Results   Component Value Date/Time    TSH 2.570 10/22/2018 11:20 AM    TSH, 3rd generation 1.85 11/13/2019 03:27 PM

## 2020-11-11 NOTE — TELEPHONE ENCOUNTER
Last Refill: 9/30/2019  Last Visit: 11/13/2019  Next Visit: 11/24/2020    Requested Prescriptions     Pending Prescriptions Disp Refills    RABEprazole (ACIPHEX) 20 mg tablet 90 Tab 3     Sig: TAKE 1 TABLET BY MOUTH DAILY

## 2020-11-24 ENCOUNTER — OFFICE VISIT (OUTPATIENT)
Dept: INTERNAL MEDICINE CLINIC | Age: 65
End: 2020-11-24
Payer: MEDICARE

## 2020-11-24 VITALS
OXYGEN SATURATION: 99 % | WEIGHT: 293 LBS | DIASTOLIC BLOOD PRESSURE: 74 MMHG | SYSTOLIC BLOOD PRESSURE: 132 MMHG | TEMPERATURE: 98 F | RESPIRATION RATE: 18 BRPM | BODY MASS INDEX: 48.82 KG/M2 | HEIGHT: 65 IN | HEART RATE: 91 BPM

## 2020-11-24 DIAGNOSIS — E66.01 CLASS 3 SEVERE OBESITY DUE TO EXCESS CALORIES WITHOUT SERIOUS COMORBIDITY WITH BODY MASS INDEX (BMI) OF 60.0 TO 69.9 IN ADULT (HCC): ICD-10-CM

## 2020-11-24 DIAGNOSIS — I10 ESSENTIAL HYPERTENSION: ICD-10-CM

## 2020-11-24 DIAGNOSIS — R53.83 FATIGUE, UNSPECIFIED TYPE: ICD-10-CM

## 2020-11-24 DIAGNOSIS — E78.2 MIXED HYPERLIPIDEMIA: ICD-10-CM

## 2020-11-24 DIAGNOSIS — R73.9 HYPERGLYCEMIA: ICD-10-CM

## 2020-11-24 DIAGNOSIS — Z00.00 MEDICARE ANNUAL WELLNESS VISIT, SUBSEQUENT: ICD-10-CM

## 2020-11-24 DIAGNOSIS — K21.9 GASTROESOPHAGEAL REFLUX DISEASE WITH HIATAL HERNIA: Primary | ICD-10-CM

## 2020-11-24 DIAGNOSIS — E55.9 VITAMIN D DEFICIENCY: ICD-10-CM

## 2020-11-24 DIAGNOSIS — K44.9 GASTROESOPHAGEAL REFLUX DISEASE WITH HIATAL HERNIA: Primary | ICD-10-CM

## 2020-11-24 DIAGNOSIS — Z23 ENCOUNTER FOR IMMUNIZATION: ICD-10-CM

## 2020-11-24 DIAGNOSIS — N39.0 URINARY TRACT INFECTION WITHOUT HEMATURIA, SITE UNSPECIFIED: ICD-10-CM

## 2020-11-24 DIAGNOSIS — Z23 NEEDS FLU SHOT: ICD-10-CM

## 2020-11-24 LAB
25(OH)D3 SERPL-MCNC: 29 NG/ML (ref 30–96)
A-G RATIO,AGRAT: 1.2 RATIO
ALBUMIN SERPL-MCNC: 4.5 G/DL (ref 3.9–5.4)
ALP SERPL-CCNC: 117 U/L (ref 38–126)
ALT SERPL-CCNC: 12 U/L (ref 0–35)
ANION GAP SERPL CALC-SCNC: 11 MMOL/L
AST SERPL W P-5'-P-CCNC: 18 U/L (ref 14–36)
BACTERIA,BACTU: ABNORMAL
BILIRUB SERPL-MCNC: 0.9 MG/DL (ref 0.2–1.3)
BILIRUB UR QL: NEGATIVE
BUN SERPL-MCNC: 17 MG/DL (ref 7–17)
BUN/CREATININE RATIO,BUCR: 24 RATIO
CALCIUM SERPL-MCNC: 10.1 MG/DL (ref 8.4–10.2)
CHLORIDE SERPL-SCNC: 104 MMOL/L (ref 98–107)
CHOL/HDL RATIO,CHHD: 4 RATIO (ref 0–4)
CHOLEST SERPL-MCNC: 186 MG/DL (ref 0–200)
CLARITY: CLEAR
CO2 SERPL-SCNC: 30 MMOL/L (ref 22–32)
COLOR UR: ABNORMAL
CREAT SERPL-MCNC: 0.7 MG/DL (ref 0.7–1.2)
ERYTHROCYTE [DISTWIDTH] IN BLOOD BY AUTOMATED COUNT: 13.9 %
GLOBULIN,GLOB: 3.8
GLUCOSE 24H UR-MRATE: NEGATIVE G/(24.H)
GLUCOSE SERPL-MCNC: 96 MG/DL (ref 65–105)
HBA1C MFR BLD HPLC: 5.3 % (ref 4–5.7)
HCT VFR BLD AUTO: 41 % (ref 37–51)
HDLC SERPL-MCNC: 53 MG/DL (ref 35–130)
HGB BLD-MCNC: 12.7 G/DL (ref 12–18)
HGB UR QL STRIP: NEGATIVE
KETONES UR QL STRIP.AUTO: NEGATIVE
LDL/HDL RATIO,LDHD: 2 RATIO
LDLC SERPL CALC-MCNC: 120 MG/DL (ref 0–130)
LEUKOCYTE ESTERASE: ABNORMAL
LYMPHOCYTES ABSOLUTE: 1.5 K/UL (ref 0.6–4.1)
LYMPHOCYTES NFR BLD: 29.2 % (ref 10–58.5)
MCH RBC QN AUTO: 27.3 PG (ref 26–32)
MCHC RBC AUTO-ENTMCNC: 31 G/DL (ref 30–36)
MCV RBC AUTO: 88.1 FL (ref 80–97)
MONOCYTES ABS-DIF,2141: 0.4 K/UL (ref 0–1.8)
MONOCYTES NFR BLD: 7.7 % (ref 0.1–24)
NEUTROPHILS # BLD: 63.1 % (ref 37–92)
NEUTROPHILS ABS,2156: 3.3 K/UL (ref 2–7.8)
NITRITE UR QL STRIP.AUTO: NEGATIVE
PH UR STRIP: 6 [PH] (ref 5–7)
PLATELET # BLD AUTO: 220 K/UL (ref 140–440)
POTASSIUM SERPL-SCNC: 4.4 MMOL/L (ref 3.6–5)
PROT SERPL-MCNC: 8.3 G/DL (ref 6.3–8.2)
PROT UR STRIP-MCNC: NEGATIVE MG/DL
RBC # BLD AUTO: 4.65 M/UL (ref 4.2–6.3)
RBC #/AREA URNS HPF: 0 #/HPF
SODIUM SERPL-SCNC: 145 MMOL/L (ref 137–145)
SP GR UR REFRACTOMETRY: 1.02 (ref 1–1.03)
T4 FREE SERPL-MCNC: 1.1 NG/DL (ref 0.58–2.3)
TRIGL SERPL-MCNC: 64 MG/DL (ref 0–200)
TSH SERPL DL<=0.05 MIU/L-ACNC: 3.33 UIU/ML (ref 0.34–5.6)
UROBILINOGEN UR QL STRIP.AUTO: NEGATIVE
VLDLC SERPL CALC-MCNC: 13 MG/DL
WBC # BLD AUTO: 5.2 K/UL (ref 4.1–10.9)
WBC URNS QL MICRO: ABNORMAL #/HPF

## 2020-11-24 PROCEDURE — 85025 COMPLETE CBC W/AUTO DIFF WBC: CPT | Performed by: NURSE PRACTITIONER

## 2020-11-24 PROCEDURE — 80053 COMPREHEN METABOLIC PANEL: CPT | Performed by: NURSE PRACTITIONER

## 2020-11-24 PROCEDURE — 83036 HEMOGLOBIN GLYCOSYLATED A1C: CPT | Performed by: NURSE PRACTITIONER

## 2020-11-24 PROCEDURE — G9899 SCRN MAM PERF RSLTS DOC: HCPCS | Performed by: NURSE PRACTITIONER

## 2020-11-24 PROCEDURE — G8752 SYS BP LESS 140: HCPCS | Performed by: NURSE PRACTITIONER

## 2020-11-24 PROCEDURE — G0008 ADMIN INFLUENZA VIRUS VAC: HCPCS | Performed by: NURSE PRACTITIONER

## 2020-11-24 PROCEDURE — G0009 ADMIN PNEUMOCOCCAL VACCINE: HCPCS | Performed by: NURSE PRACTITIONER

## 2020-11-24 PROCEDURE — 3017F COLORECTAL CA SCREEN DOC REV: CPT | Performed by: NURSE PRACTITIONER

## 2020-11-24 PROCEDURE — 84439 ASSAY OF FREE THYROXINE: CPT | Performed by: NURSE PRACTITIONER

## 2020-11-24 PROCEDURE — G0439 PPPS, SUBSEQ VISIT: HCPCS | Performed by: NURSE PRACTITIONER

## 2020-11-24 PROCEDURE — 1090F PRES/ABSN URINE INCON ASSESS: CPT | Performed by: NURSE PRACTITIONER

## 2020-11-24 PROCEDURE — G8536 NO DOC ELDER MAL SCRN: HCPCS | Performed by: NURSE PRACTITIONER

## 2020-11-24 PROCEDURE — 80061 LIPID PANEL: CPT | Performed by: NURSE PRACTITIONER

## 2020-11-24 PROCEDURE — 90670 PCV13 VACCINE IM: CPT | Performed by: NURSE PRACTITIONER

## 2020-11-24 PROCEDURE — 1101F PT FALLS ASSESS-DOCD LE1/YR: CPT | Performed by: NURSE PRACTITIONER

## 2020-11-24 PROCEDURE — 81003 URINALYSIS AUTO W/O SCOPE: CPT | Performed by: NURSE PRACTITIONER

## 2020-11-24 PROCEDURE — 84443 ASSAY THYROID STIM HORMONE: CPT | Performed by: NURSE PRACTITIONER

## 2020-11-24 PROCEDURE — 82306 VITAMIN D 25 HYDROXY: CPT | Performed by: NURSE PRACTITIONER

## 2020-11-24 PROCEDURE — G8754 DIAS BP LESS 90: HCPCS | Performed by: NURSE PRACTITIONER

## 2020-11-24 PROCEDURE — G8427 DOCREV CUR MEDS BY ELIG CLIN: HCPCS | Performed by: NURSE PRACTITIONER

## 2020-11-24 PROCEDURE — G8400 PT W/DXA NO RESULTS DOC: HCPCS | Performed by: NURSE PRACTITIONER

## 2020-11-24 PROCEDURE — 90694 VACC AIIV4 NO PRSRV 0.5ML IM: CPT | Performed by: NURSE PRACTITIONER

## 2020-11-24 PROCEDURE — G8417 CALC BMI ABV UP PARAM F/U: HCPCS | Performed by: NURSE PRACTITIONER

## 2020-11-24 PROCEDURE — 99214 OFFICE O/P EST MOD 30 MIN: CPT | Performed by: NURSE PRACTITIONER

## 2020-11-24 PROCEDURE — G8432 DEP SCR NOT DOC, RNG: HCPCS | Performed by: NURSE PRACTITIONER

## 2020-11-24 NOTE — PROGRESS NOTES
Talita Sawant is a 76 y.o. female     Chief Complaint   Patient presents with    Annual Wellness Visit     medicare wellness/cpe       Visit Vitals  /74 (BP 1 Location: Left arm, BP Patient Position: Sitting)   Pulse 91   Temp 98 °F (36.7 °C) (Oral)   Resp 18   Ht 5' 5\" (1.651 m)   Wt (!) 406 lb 12.8 oz (184.5 kg)   SpO2 99%   BMI 67.70 kg/m²       Health Maintenance Due   Topic Date Due    DTaP/Tdap/Td series (1 - Tdap) 03/14/1975    Shingrix Vaccine Age 50> (1 of 2) 03/14/2004    GLAUCOMA SCREENING Q2Y  03/14/2019    Bone Densitometry (Dexa) Screening  03/14/2019    Pneumococcal 65+ years (1 of 1 - PPSV23) 03/14/2019    Flu Vaccine (1) 09/01/2020    Medicare Yearly Exam  11/13/2020    Lipid Screen  11/13/2020       1. Have you been to the ER, urgent care clinic since your last visit? Hospitalized since your last visit? No    2. Have you seen or consulted any other health care providers outside of the [address] [place] [address] since your last visit? Include any pap smears or colon screening. No      Administered Influenza vaccine in left deltoid patient tolerated injection well. ICP#:[id number]    Exp:6/30/21    EEN:[id number]    Administered Prevnar 13 in left deltoid patient tolerated injection well.     VJE#:[id number]    Exp:8/31/22    HP:[id number]

## 2020-11-24 NOTE — PROGRESS NOTES
This is the Subsequent Medicare Annual Wellness Exam, performed 12 months or more after the Initial AWV or the last Subsequent AWV    I have reviewed the patient's medical history in detail and updated the computerized patient record. Depression Risk Factor Screening:     3 most recent PHQ Screens 11/24/2020   Little interest or pleasure in doing things Not at all   Feeling down, depressed, irritable, or hopeless Not at all   Total Score PHQ 2 0       Alcohol Risk Screen   Do you average more than 1 drink per night or more than 7 drinks a week:  No    On any one occasion in the past three months have you have had more than 3 drinks containing alcohol:  No        Functional Ability and Level of Safety:   Hearing: Hearing is good. Activities of Daily Living: The home contains: grab bars  Patient does total self care     Ambulation: with no difficulty     Fall Risk:  Fall Risk Assessment, last 12 mths 11/24/2020   Able to walk? Yes   Fall in past 12 months? No     Abuse Screen:  Patient is not abused       Cognitive Screening   Has your family/caregiver stated any concerns about your memory: no     Cognitive Screening: Normal - MMSE (Mini Mental Status Exam)    Assessment/Plan   Education and counseling provided:  Are appropriate based on today's review and evaluation    Diagnoses and all orders for this visit:    1. Gastroesophageal reflux disease with hiatal hernia    2. Hyperglycemia  -     HEMOGLOBIN A1C W/O EAG    3. Fatigue, unspecified type  -     TSH 3RD GENERATION  -     T4, FREE    4. Vitamin D deficiency  -     VITAMIN D, 25 HYDROXY    5. Mixed hyperlipidemia  -     LIPID PANEL    6. Essential hypertension  -     CBC WITH AUTOMATED DIFF  -     METABOLIC PANEL, COMPREHENSIVE  -     URINALYSIS W/O MICRO    7. Needs flu shot  -     FLU (FLUAD QUAD INFLUENZA VACCINE,QUAD,ADJUVANTED)    8.  Encounter for immunization  -     PNEUMOCOCCAL CONJ VACCINE 13 VALENT IM    Other orders  -     URINALYSIS; MICROSCOPIC ONLY        Health Maintenance Due     Health Maintenance Due   Topic Date Due    DTaP/Tdap/Td series (1 - Tdap) 03/14/1975    Shingrix Vaccine Age 50> (1 of 2) 03/14/2004    GLAUCOMA SCREENING Q2Y  03/14/2019    Bone Densitometry (Dexa) Screening  03/14/2019    Medicare Yearly Exam  11/13/2020       Patient Care Team   Patient Care Team:  Archana Khan NP as PCP - General (Nurse Practitioner)  [facility]  Belinda Salazar MD as Surgeon (General Surgery)    History     Patient Active Problem List   Diagnosis Code    Left knee DJD M17.12    Contact dermatitis due to cosmetics L25.0    Dietary counseling Z71.3    DJD (degenerative joint disease) of knee M17.10    Gallstones K80.20    Gastroesophageal reflux disease with hiatal hernia K21.9, K44.9    Headache R51.9    Hip pain, right M25.551    HTN (hypertension) I10    Hyperlipidemia E78.5    Hypokalemia E87.6    Left thyroid nodule E04.1    Leg swelling M79.89    Lower abdominal pain R10.30    Lumbar disc disease M51.9    Menopause Z78.0    Morbid obesity ([address] E66.01    Obesity (BMI 30-39. 9) E66.9    Rash R21    Rhinitis, allergic J30.9    Swelling of both lower extremities M79.89    Tinea corporis B35.4    Urinary incontinence R32    Vitamin D deficiency E55.9    Abdominal pain R10.9    Cough R05    History of transfusion Z92.89    Degenerative joint disease M19.90     Past Medical History:   Diagnosis Date    Abdominal pain 8/23/2017    Annual physical exam 8/23/2017    Arthritis     Chronic pain     Contact dermatitis due to cosmetics 8/23/2017    Cough 8/23/2017    Degenerative joint disease 8/23/2017    Dietary counseling 8/23/2017    DJD (degenerative joint disease) of knee 8/23/2017    Gallstones 8/23/2017    Gastroesophageal reflux disease with hiatal hernia 8/23/2017    GERD (gastroesophageal reflux disease)     Hip pain, right 8/23/2017    History of transfusion 8/23/2017    HTN (hypertension) 8/23/2017    Hypercholesteremia     Hyperlipidemia 8/23/2017    Hypertension     Hypokalemia 8/23/2017    Left thyroid nodule 8/23/2017    Leg swelling 8/23/2017    Lower abdominal pain 8/23/2017    Lumbar disc disease 8/23/2017    Menopause 8/23/2017    Morbid obesity ([address] 8/23/2017    Obesity (BMI 30-39. 9) 8/23/2017    Overactive bladder     Rash 8/23/2017    Rhinitis, allergic 8/23/2017    Screening for diabetes mellitus (DM) 8/23/2017    Screening for hypothyroidism 8/23/2017    Swelling of both lower extremities 8/23/2017    Tinea corporis 8/23/2017    Urinary incontinence 8/23/2017    Vitamin D deficiency 8/23/2017      Past Surgical History:   Procedure Laterality Date    ABDOMEN SURGERY PROC UNLISTED      HERNIA    COLONOSCOPY N/A 2/9/2018    COLONOSCOPY performed by [facility]an [facility] at [facility]  11/12/2014         HX CATARACT REMOVAL Right     HX HEENT      NODULES-VOCAL CORD    HX ORTHOPAEDIC  2004    ELIGIO KNEE REPLACEMENT    HX TONSILLECTOMY  AS CHILD    HX TUBAL LIGATION       Current Outpatient Medications   Medication Sig Dispense Refill    RABEprazole (ACIPHEX) 20 mg tablet TAKE 1 TABLET BY MOUTH DAILY 90 Tab 3    potassium chloride SA (MICRO-K) 10 mEq capsule TAKE 2 CAPSULES DAILY 180 Cap 3    lisinopril-hydroCHLOROthiazide (PRINZIDE, ZESTORETIC) 10-12.5 mg per tablet TAKE 1 TABLET DAILY 90 Tab 3    simvastatin (ZOCOR) 40 mg tablet Take 1 tab daily 90 Tab 3    diclofenac (VOLTAREN) 1 % gel Apply  to affected area four (4) times daily.  biotin (VITAMIN B7) 5 mg capsule Take 10 mg by mouth.  clotrimazole-betamethasone (LOTRISONE) topical cream Apply  to affected area two (2) times a day.  mirabegron ER (MYRBETRIQ) 25 mg ER tablet Take 25 mg by mouth daily.  ERGOCALCIFEROL, VITAMIN D2, (VITAMIN D2 PO) Take  by mouth.       omega-3 fatty acids-vitamin e (FISH OIL) 1,000 mg cap Take 1 Cap by mouth daily.  azithromycin (ZITHROMAX) 250 mg tablet Take 2 tablets initially then one tablet daily until completed 6 Tab 0     Allergies   Allergen Reactions    Adhesive Tape-Silicones Unknown (comments)       Family History   Problem Relation Age of Onset    Diabetes Mother     Cancer Father     Hypertension Sister     Hypertension Brother     Heart Disease Brother      Social History     Tobacco Use    Smoking status: Never Smoker    Smokeless tobacco: Never Used   Substance Use Topics    Alcohol use: No   Subjective:  Ms. Raquel Morataya is a pleasant 59-year-old lady, who comes in today for her updated history and physical and Medicare wellness. History of Present Illness: In the past year Ms. Mattson has seen Dr. Keya Corey at the [facility] and been worked up for urinary incontinence. She has had Botox injections, as well as PTNS, although she does not think this has been helpful. Dr. Nakita Caceres and Mrs Mattson discussed that perhaps Hydrochlorothiazide is what is causing her urinary incontinence. Ms. Raquel Morataya tells me that on the days she skips her medication, she is much better. She wants to see if we can change her medication. Past Medical History/Surgeries:    1. Tonsillectomy. 2. Bilateral tubal ligation. 3. Repair of umbilical hernia. 4. Bilateral total knee arthroplasty. 5. Excision of benign vocal cord nodules. 6. Left cataract extraction. Family History:  Father , unsure of cause. Mother  at 80, complications of diabetes, she was hypertensive. Several siblings are living. Social History:  She is . She is retired. She has two sons living and well. Allergies:  Adhesive tape and silicone. Medications:  1. Biotin 10 mg daily. 2. Lotrisone cream twice daily as needed. 3. Voltaren 1% gel four times a day as needed. 4. Vitamin D 1,000 units daily.   5. Lisinopril/Hydrochlorothiazide 10/12.5 mg daily. 6. Myrbetriq ER 25 mg daily. 7. Fish oil 1,000 mg daily. 8. Potassium chloride 10 mEq, two tablets daily. 9. Aciphex 20 mg daily. 10. Simvastatin 40 mg daily. Habits:  Nonsmoker and a non drinker. Review of Systems:  HEENT:  Denies any headaches, dizziness or blurred vision. She is up to date with her eye exam.  CVR:  Denies any chest pain or palpitations. Denies any syncopal episodes, shortness of breath, cough, wheezing, PND or orthopnea. Denies any ankle edema. GI:  Appetite is good, weight is persistently going up. Does have a normal bowel pattern without presence of blood in stools or melena. :  Urinary incontinence as noted previously. Physical Examination:  GENERAL:  Pleasant lady in no acute distress. She is alert and oriented. She answers my questions appropriately. She is unable to get on the examining table. VITALS:  Blood Pressure:  132/74. Pulse:  91.  Respirations:  18.  Temperature:  98.  O2 sat:  96.  Weight:  406 pounds. HEENT:  Normocephalic, atraumatic. PERRLA, EOMI. TMs normal.  Mouth mucosa pink. Tongue midline. Pharynx normal.  NECK:  Supple without adenopathy, thyromegaly or carotid bruits. CHEST:  Lungs clear to auscultation, no rales or wheezes. CV:  Heart regular rhythm without murmur. ABDOMEN/BREASTS:  Exam deferred as she could not get on the examining table. EXTREMITIES:  Trace pitting edema in both ankles. No calf tenderness. Distal pulses were present. NEUROLOGIC:  Cranial nerves II-XII intact. Excellent strength in the upper and lower extremities against resistance. Sensation is preserved. Romberg is negative. She had good coordination. Impression:  1. Hypertension. 2. Hyperlipidemia. 3. OAB syndrome. 4. GERD. 5. Morbid obesity. Plan:  1. She was fasting this morning, so therefore it was opted to do all of her lab studies and I will call her as soon as I have her results.   2. In terms of her OAB syndrome, it was opted to give her a prescription for Lisinopril 10 mg that she will take one tablet daily and a prescription for Hydrochlorothiazide 12.5 mg, one daily as needed for fluid retention. We will try this for at least 30 days. If she sees no improvement, then she may resume her previous dosage. However, I do want to see her back in three weeks for a blood pressure check. She is agreeable. 3. Once again we discussed the importance of weight loss, which unfortunately she was unsuccessful at and her weight is persistently going up. 4. While in the office today we went ahead and gave her a flu vaccination as well as Prevnar 13. She tolerated it well.

## 2020-11-24 NOTE — PATIENT INSTRUCTIONS
Medicare Wellness Visit, Female The best way to live healthy is to have a lifestyle where you eat a well-balanced diet, exercise regularly, limit alcohol use, and quit all forms of tobacco/nicotine, if applicable. Regular preventive services are another way to keep healthy. Preventive services (vaccines, screening tests, monitoring & exams) can help personalize your care plan, which helps you manage your own care. Screening tests can find health problems at the earliest stages, when they are easiest to treat. [facility] follows the current, evidence-based guidelines published by the [facility] Jackson ([facility]) when recommending preventive services for our patients. Because we follow these guidelines, sometimes recommendations change over time as research supports it. (For example, mammograms used to be recommended annually. Even though Medicare will still pay for an annual mammogram, the newer guidelines recommend a mammogram every two years for women of average risk). Of course, you and your doctor may decide to screen more often for some diseases, based on your risk and your co-morbidities (chronic disease you are already diagnosed with). Preventive services for you include: - Medicare offers their members a free annual wellness visit, which is time for you and your primary care provider to discuss and plan for your preventive service needs. Take advantage of this benefit every year! 
-All adults over the age of 72 should receive the recommended pneumonia vaccines. Current USPSTF guidelines recommend a series of two vaccines for the best pneumonia protection.  
-All adults should have a flu vaccine yearly and a tetanus vaccine every 10 years.  
-All adults age 48 and older should receive the shingles vaccines (series of two vaccines). -All adults age 38-68 who are overweight should have a diabetes screening test once every three years. -All adults born between [birth year] and 1966 should be screened once for Hepatitis C. 
-Other screening tests and preventive services for persons with diabetes include: an eye exam to screen for diabetic retinopathy, a kidney function test, a foot exam, and stricter control over your cholesterol.  
-Cardiovascular screening for adults with routine risk involves an electrocardiogram (ECG) at intervals determined by your doctor.  
-Colorectal cancer screenings should be done for adults age 54-65 with no increased risk factors for colorectal cancer. There are a number of acceptable methods of screening for this type of cancer. Each test has its own benefits and drawbacks. Discuss with your doctor what is most appropriate for you during your annual wellness visit. The different tests include: colonoscopy (considered the best screening method), a fecal occult blood test, a fecal DNA test, and sigmoidoscopy. 
 
-A bone mass density test is recommended when a woman turns 65 to screen for osteoporosis. This test is only recommended one time, as a screening. Some providers will use this same test as a disease monitoring tool if you already have osteoporosis. -Breast cancer screenings are recommended every other year for women of normal risk, age 54-69. 
-Cervical cancer screenings for women over age 72 are only recommended with certain risk factors. Here is a list of your current Health Maintenance items (your personalized list of preventive services) with a due date: 
Health Maintenance Due Topic Date Due  
 DTaP/Tdap/Td  (1 - Tdap) 03/14/1975  Shingles Vaccine (1 of 2) 03/14/2004  Glaucoma Screening   03/14/2019  Bone Mineral Density   03/14/2019 [address] [place] [address] Annual Well Visit  11/13/2020 Body Mass Index: Care Instructions Your Care Instructions Body mass index (BMI) can help you see if your weight is raising your risk for health problems.  It uses a formula to compare how much you weigh with how tall you are. · A BMI lower than 18.5 is considered underweight. · A BMI between 18.5 and 24.9 is considered healthy. · A BMI between 25 and 29.9 is considered overweight. A BMI of 30 or higher is considered obese. If your BMI is in the normal range, it means that you have a lower risk for weight-related health problems. If your BMI is in the overweight or obese range, you may be at increased risk for weight-related health problems, such as high blood pressure, heart disease, stroke, arthritis or joint pain, and diabetes. If your BMI is in the underweight range, you may be at increased risk for health problems such as fatigue, lower protection (immunity) against illness, muscle loss, bone loss, hair loss, and hormone problems. BMI is just one measure of your risk for weight-related health problems. You may be at higher risk for health problems if you are not active, you eat an unhealthy diet, or you drink too much alcohol or use tobacco products. Follow-up care is a key part of your treatment and safety. Be sure to make and go to all appointments, and call your doctor if you are having problems. It's also a good idea to know your test results and keep a list of the medicines you take. How can you care for yourself at home? · Practice healthy eating habits. This includes eating plenty of fruits, vegetables, whole grains, lean protein, and low-fat dairy. · If your doctor recommends it, get more exercise. Walking is a good choice. Bit by bit, increase the amount you walk every day. Try for at least 30 minutes on most days of the week. · Do not smoke. Smoking can increase your risk for health problems. If you need help quitting, talk to your doctor about stop-smoking programs and medicines. These can increase your chances of quitting for good. · Limit alcohol to 2 drinks a day for men and 1 drink a day for women. Too much alcohol can cause health problems. If you have a BMI higher than 25 · Your doctor may do other tests to check your risk for weight-related health problems. This may include measuring the distance around your waist. A waist measurement of more than 40 inches in men or 35 inches in women can increase the risk of weight-related health problems. · Talk with your doctor about steps you can take to stay healthy or improve your health. You may need to make lifestyle changes to lose weight and stay healthy, such as changing your diet and getting regular exercise. If you have a BMI lower than 18.5 · Your doctor may do other tests to check your risk for health problems. · Talk with your doctor about steps you can take to stay healthy or improve your health. You may need to make lifestyle changes to gain or maintain weight and stay healthy, such as getting more healthy foods in your diet and doing exercises to build muscle. Where can you learn more? Go to http://jean pierre-shanae.info/ Enter S176 in the search box to learn more about \"Body Mass Index: Care Instructions. \" Current as of: December 11, 2019               Content Version: 12.6 © 6265-1923 [employer]. Care instructions adapted under license by [employer] (which disclaims liability or warranty for this information). If you have questions about a medical condition or this instruction, always ask your healthcare professional. [address] any warranty or liability for your use of this information.

## 2020-11-25 ENCOUNTER — TELEPHONE (OUTPATIENT)
Dept: INTERNAL MEDICINE CLINIC | Age: 65
End: 2020-11-25

## 2020-11-25 RX ORDER — HYDROCHLOROTHIAZIDE 12.5 MG/1
12.5 TABLET ORAL DAILY
Qty: 30 TAB | Refills: 1 | Status: SHIPPED | OUTPATIENT
Start: 2020-11-25 | End: 2020-12-29 | Stop reason: ALTCHOICE

## 2020-11-25 RX ORDER — LISINOPRIL 10 MG/1
10 TABLET ORAL DAILY
Qty: 30 TAB | Refills: 1 | Status: SHIPPED | OUTPATIENT
Start: 2020-11-25 | End: 2020-12-29 | Stop reason: ALTCHOICE

## 2020-11-25 NOTE — TELEPHONE ENCOUNTER
Patient was seen yesterday and was told to start taking separate Hydrochlorothiazide and lisinopril prescriptions today but the separate prescriptions were not called into her pharmacy.  Please advise  CB: [phone]

## 2020-11-26 LAB — BACTERIA UR CULT: NORMAL

## 2020-12-01 ENCOUNTER — TELEPHONE (OUTPATIENT)
Dept: INTERNAL MEDICINE CLINIC | Age: 65
End: 2020-12-01

## 2020-12-23 ENCOUNTER — OFFICE VISIT (OUTPATIENT)
Dept: INTERNAL MEDICINE CLINIC | Age: 65
End: 2020-12-23
Payer: MEDICARE

## 2020-12-23 VITALS
RESPIRATION RATE: 18 BRPM | HEART RATE: 83 BPM | SYSTOLIC BLOOD PRESSURE: 142 MMHG | HEIGHT: 65 IN | OXYGEN SATURATION: 99 % | TEMPERATURE: 98.4 F | WEIGHT: 293 LBS | DIASTOLIC BLOOD PRESSURE: 90 MMHG | BODY MASS INDEX: 48.82 KG/M2

## 2020-12-23 DIAGNOSIS — I10 ESSENTIAL HYPERTENSION: Primary | ICD-10-CM

## 2020-12-23 PROCEDURE — G8432 DEP SCR NOT DOC, RNG: HCPCS | Performed by: NURSE PRACTITIONER

## 2020-12-23 PROCEDURE — G8427 DOCREV CUR MEDS BY ELIG CLIN: HCPCS | Performed by: NURSE PRACTITIONER

## 2020-12-23 PROCEDURE — G8417 CALC BMI ABV UP PARAM F/U: HCPCS | Performed by: NURSE PRACTITIONER

## 2020-12-23 PROCEDURE — G8400 PT W/DXA NO RESULTS DOC: HCPCS | Performed by: NURSE PRACTITIONER

## 2020-12-23 PROCEDURE — G9899 SCRN MAM PERF RSLTS DOC: HCPCS | Performed by: NURSE PRACTITIONER

## 2020-12-23 PROCEDURE — G8536 NO DOC ELDER MAL SCRN: HCPCS | Performed by: NURSE PRACTITIONER

## 2020-12-23 PROCEDURE — 3017F COLORECTAL CA SCREEN DOC REV: CPT | Performed by: NURSE PRACTITIONER

## 2020-12-23 PROCEDURE — G8755 DIAS BP > OR = 90: HCPCS | Performed by: NURSE PRACTITIONER

## 2020-12-23 PROCEDURE — 99213 OFFICE O/P EST LOW 20 MIN: CPT | Performed by: NURSE PRACTITIONER

## 2020-12-23 PROCEDURE — 1090F PRES/ABSN URINE INCON ASSESS: CPT | Performed by: NURSE PRACTITIONER

## 2020-12-23 PROCEDURE — G8753 SYS BP > OR = 140: HCPCS | Performed by: NURSE PRACTITIONER

## 2020-12-23 PROCEDURE — 1101F PT FALLS ASSESS-DOCD LE1/YR: CPT | Performed by: NURSE PRACTITIONER

## 2020-12-23 NOTE — PROGRESS NOTES
Geeta Tovar is a 76 y.o. female     Chief Complaint   Patient presents with    Blood Pressure Check     bp check       Visit Vitals  BP (!) 142/90 (BP 1 Location: Left arm, BP Patient Position: Sitting)   Pulse 83   Temp 98.4 °F (36.9 °C) (Temporal)   Resp 18   Ht 5' 5\" (1.651 m)   Wt (!) 414 lb (187.8 kg)   SpO2 99%   BMI 68.89 kg/m²       Health Maintenance Due   Topic Date Due    DTaP/Tdap/Td series (1 - Tdap) 03/14/1975    Shingrix Vaccine Age 50> (1 of 2) 03/14/2004    GLAUCOMA SCREENING Q2Y  03/14/2019    Bone Densitometry (Dexa) Screening  03/14/2019    Breast Cancer Screen Mammogram  01/07/2021       1. Have you been to the ER, urgent care clinic since your last visit? Hospitalized since your last visit? No    2. Have you seen or consulted any other health care providers outside of the [address] [place] [address] since your last visit? Include any pap smears or colon screening.  No

## 2020-12-23 NOTE — PROGRESS NOTES
Subjective:  Ms. Ania De La Torre is a pleasant 69-year-old lady, who comes in today for a blood pressure check. When I saw her at her last visit, she wondered if coming off the Hydrochlorothiazide would help with her urinary frequency, as suggested by her urologist.  She tells me that she does not think that coming off the medication really made a big change. She does not note any ankle edema while off the medication. Today she specifically denies any headaches, dizziness or blurred vision. She denies chest pain or palpitations. Denies shortness of breath, cough, wheezing, PND or orthopnea. Past Medical History:   Diagnosis Date    Abdominal pain 8/23/2017    Annual physical exam 8/23/2017    Arthritis     Chronic pain     Contact dermatitis due to cosmetics 8/23/2017    Cough 8/23/2017    Degenerative joint disease 8/23/2017    Dietary counseling 8/23/2017    DJD (degenerative joint disease) of knee 8/23/2017    Gallstones 8/23/2017    Gastroesophageal reflux disease with hiatal hernia 8/23/2017    GERD (gastroesophageal reflux disease)     Hip pain, right 8/23/2017    History of transfusion 8/23/2017    HTN (hypertension) 8/23/2017    Hypercholesteremia     Hyperlipidemia 8/23/2017    Hypertension     Hypokalemia 8/23/2017    Left thyroid nodule 8/23/2017    Leg swelling 8/23/2017    Lower abdominal pain 8/23/2017    Lumbar disc disease 8/23/2017    Menopause 8/23/2017    Morbid obesity ([address] 8/23/2017    Obesity (BMI 30-39. 9) 8/23/2017    Overactive bladder     Rash 8/23/2017    Rhinitis, allergic 8/23/2017    Screening for diabetes mellitus (DM) 8/23/2017    Screening for hypothyroidism 8/23/2017    Swelling of both lower extremities 8/23/2017    Tinea corporis 8/23/2017    Urinary incontinence 8/23/2017    Vitamin D deficiency 8/23/2017     Past Surgical History:   Procedure Laterality Date    ABDOMEN SURGERY PROC UNLISTED      HERNIA    COLONOSCOPY N/A 2/9/2018 [facility]  11/12/2014         HX CATARACT REMOVAL Right     HX HEENT      [facility] [facility] HX ORTHOPAEDIC  2004    ELIGIO KNEE REPLACEMENT    HX TONSILLECTOMY  AS CHILD    HX TUBAL LIGATION         Current Outpatient Medications on File Prior to Visit   Medication Sig Dispense Refill    lisinopriL (PRINIVIL, ZESTRIL) 10 mg tablet Take 1 Tab by mouth daily. 30 Tab 1    hydroCHLOROthiazide (HYDRODIURIL) 12.5 mg tablet Take 1 Tab by mouth daily. 30 Tab 1    RABEprazole (ACIPHEX) 20 mg tablet TAKE 1 TABLET BY MOUTH DAILY 90 Tab 3    potassium chloride SA (MICRO-K) 10 mEq capsule TAKE 2 CAPSULES DAILY 180 Cap 3    simvastatin (ZOCOR) 40 mg tablet Take 1 tab daily 90 Tab 3    diclofenac (VOLTAREN) 1 % gel Apply  to affected area four (4) times daily.  biotin (VITAMIN B7) 5 mg capsule Take 10 mg by mouth.  clotrimazole-betamethasone (LOTRISONE) topical cream Apply  to affected area two (2) times a day.  mirabegron ER (MYRBETRIQ) 25 mg ER tablet Take 25 mg by mouth daily.  ERGOCALCIFEROL, VITAMIN D2, (VITAMIN D2 PO) Take  by mouth.  omega-3 fatty acids-vitamin e (FISH OIL) 1,000 mg cap Take 1 Cap by mouth daily.  lisinopril-hydroCHLOROthiazide (PRINZIDE, ZESTORETIC) 10-12.5 mg per tablet TAKE 1 TABLET DAILY 90 Tab 3    azithromycin (ZITHROMAX) 250 mg tablet Take 2 tablets initially then one tablet daily until completed 6 Tab 0     No current facility-administered medications on file prior to visit. Allergies   Allergen Reactions    Adhesive Tape-Silicones Unknown (comments)   Physical Examination:  GENERAL:  Pleasant, morbidly obese lady in no acute distress. She is alert and oriented. She answers my questions appropriately. VITALS:  Blood Pressure:  142/90. Pulse:  83.  Respirations:  18.  Temperature:  98.2. O2 sat:  99%.   Weight:  414 pounds, which is up by 8 pounds since her last visit. Impression:  1. Hypertension, slightly elevated since off Hydrochlorothiazide. 2. Morbid obesity. I suspect her weight is up secondary to fluid retention. Plan:  1. She would like to get back on her Hydrochlorothiazide. I am agreeable. However, if she would rather we increase her Lisinopril, it is certainly an option. However, since she does not feel that the Hydrochlorothiazide made a big difference with her urinary frequency, she would rather get back on it. 2. I do want to see her back in three weeks for a blood pressure check or sooner if she has any concerns.

## 2020-12-27 NOTE — PATIENT INSTRUCTIONS
High Blood Pressure: Care Instructions Overview It's normal for blood pressure to go up and down throughout the day. But if it stays up, you have high blood pressure. Another name for high blood pressure is hypertension. Despite what a lot of people think, high blood pressure usually doesn't cause headaches or make you feel dizzy or lightheaded. It usually has no symptoms. But it does increase your risk of stroke, heart attack, and other problems. You and your doctor will talk about your risks of these problems based on your blood pressure. Your doctor will give you a goal for your blood pressure. Your goal will be based on your health and your age. Lifestyle changes, such as eating healthy and being active, are always important to help lower blood pressure. You might also take medicine to reach your blood pressure goal. 
Follow-up care is a key part of your treatment and safety. Be sure to make and go to all appointments, and call your doctor if you are having problems. It's also a good idea to know your test results and keep a list of the medicines you take. How can you care for yourself at home? Medical treatment · If you stop taking your medicine, your blood pressure will go back up. You may take one or more types of medicine to lower your blood pressure. Be safe with medicines. Take your medicine exactly as prescribed. Call your doctor if you think you are having a problem with your medicine. · Talk to your doctor before you start taking aspirin every day. Aspirin can help certain people lower their risk of a heart attack or stroke. But taking aspirin isn't right for everyone, because it can cause serious bleeding. · See your doctor regularly. You may need to see the doctor more often at first or until your blood pressure comes down. · If you are taking blood pressure medicine, talk to your doctor before you take decongestants or anti-inflammatory medicine, such as ibuprofen. Some of these medicines can raise blood pressure. · Learn how to check your blood pressure at home. Lifestyle changes · Stay at a healthy weight. This is especially important if you put on weight around the waist. Losing even 10 pounds can help you lower your blood pressure. · If your doctor recommends it, get more exercise. Walking is a good choice. Bit by bit, increase the amount you walk every day. Try for at least 30 minutes on most days of the week. You also may want to swim, bike, or do other activities. · Avoid or limit alcohol. Talk to your doctor about whether you can drink any alcohol. · Try to limit how much sodium you eat to less than 2,300 milligrams (mg) a day. Your doctor may ask you to try to eat less than 1,500 mg a day. · Eat plenty of fruits (such as bananas and oranges), vegetables, legumes, whole grains, and low-fat dairy products. · Lower the amount of saturated fat in your diet. Saturated fat is found in animal products such as milk, cheese, and meat. Limiting these foods may help you lose weight and also lower your risk for heart disease. · Do not smoke. Smoking increases your risk for heart attack and stroke. If you need help quitting, talk to your doctor about stop-smoking programs and medicines. These can increase your chances of quitting for good. When should you call for help? Call  911 anytime you think you may need emergency care. This may mean having symptoms that suggest that your blood pressure is causing a serious heart or blood vessel problem. Your blood pressure may be over 180/120. For example, call 911 if: 
  · You have symptoms of a heart attack. These may include: 
? Chest pain or pressure, or a strange feeling in the chest. 
? Sweating. ? Shortness of breath. ? Nausea or vomiting. ? Pain, pressure, or a strange feeling in the back, neck, jaw, or upper belly or in one or both shoulders or arms. ? Lightheadedness or sudden weakness. ? A fast or irregular heartbeat.  
  · You have symptoms of a stroke. These may include: 
? Sudden numbness, tingling, weakness, or loss of movement in your face, arm, or leg, especially on only one side of your body. ? Sudden vision changes. ? Sudden trouble speaking. ? Sudden confusion or trouble understanding simple statements. ? Sudden problems with walking or balance. ? A sudden, severe headache that is different from past headaches.  
  · You have severe back or belly pain. Do not wait until your blood pressure comes down on its own. Get help right away. Call your doctor now or seek immediate care if: 
  · Your blood pressure is much higher than normal (such as 180/120 or higher), but you don't have symptoms.  
  · You think high blood pressure is causing symptoms, such as: 
? Severe headache. 
? Blurry vision. Watch closely for changes in your health, and be sure to contact your doctor if: 
  · Your blood pressure measures higher than your doctor recommends at least 2 times. That means the top number is higher or the bottom number is higher, or both.  
  · You think you may be having side effects from your blood pressure medicine. Where can you learn more? Go to http://www.gray.com/ Enter D898 in the search box to learn more about \"High Blood Pressure: Care Instructions. \" Current as of: December 16, 2019               Content Version: 12.6 © 0265-9209 [employer]. Care instructions adapted under license by [employer] (which disclaims liability or warranty for this information). If you have questions about a medical condition or this instruction, always ask your healthcare professional. [address] any warranty or liability for your use of this information.

## 2020-12-28 NOTE — TELEPHONE ENCOUNTER
Last Refill: 11/25/2020  Last Visit: 12/23/2020   Next Visit: 1/12/2021    Requested Prescriptions     Pending Prescriptions Disp Refills    hydroCHLOROthiazide (HYDRODIURIL) 12.5 mg tablet 30 Tab 1     Sig: Take 1 Tab by mouth daily.

## 2020-12-28 NOTE — TELEPHONE ENCOUNTER
Last Refill: 11/25/2020  Last Visit: 12/23/2020   Next Visit: 1/12/2021    Requested Prescriptions     Pending Prescriptions Disp Refills    lisinopriL (PRINIVIL, ZESTRIL) 10 mg tablet 30 Tab 1     Sig: Take 1 Tab by mouth daily.      Patient would like a 90 day supply of this medication

## 2020-12-29 RX ORDER — LISINOPRIL 10 MG/1
10 TABLET ORAL DAILY
Qty: 30 TAB | Refills: 1 | OUTPATIENT
Start: 2020-12-29

## 2020-12-29 RX ORDER — HYDROCHLOROTHIAZIDE 12.5 MG/1
12.5 TABLET ORAL DAILY
Qty: 90 TAB | Refills: 1 | OUTPATIENT
Start: 2020-12-29

## 2020-12-29 RX ORDER — LISINOPRIL AND HYDROCHLOROTHIAZIDE 10; 12.5 MG/1; MG/1
TABLET ORAL
Qty: 90 TAB | Refills: 3 | Status: SHIPPED | OUTPATIENT
Start: 2020-12-29 | End: 2021-08-30 | Stop reason: SDUPTHER

## 2021-01-12 ENCOUNTER — OFFICE VISIT (OUTPATIENT)
Dept: INTERNAL MEDICINE CLINIC | Age: 66
End: 2021-01-12
Payer: MEDICARE

## 2021-01-12 VITALS
HEART RATE: 84 BPM | TEMPERATURE: 98.7 F | OXYGEN SATURATION: 100 % | BODY MASS INDEX: 48.82 KG/M2 | HEIGHT: 65 IN | DIASTOLIC BLOOD PRESSURE: 76 MMHG | WEIGHT: 293 LBS | SYSTOLIC BLOOD PRESSURE: 132 MMHG | RESPIRATION RATE: 18 BRPM

## 2021-01-12 DIAGNOSIS — I10 ESSENTIAL HYPERTENSION: Primary | ICD-10-CM

## 2021-01-12 DIAGNOSIS — E66.01 CLASS 3 SEVERE OBESITY DUE TO EXCESS CALORIES WITHOUT SERIOUS COMORBIDITY WITH BODY MASS INDEX (BMI) OF 60.0 TO 69.9 IN ADULT (HCC): ICD-10-CM

## 2021-01-12 PROCEDURE — 99213 OFFICE O/P EST LOW 20 MIN: CPT | Performed by: NURSE PRACTITIONER

## 2021-01-12 PROCEDURE — 1101F PT FALLS ASSESS-DOCD LE1/YR: CPT | Performed by: NURSE PRACTITIONER

## 2021-01-12 PROCEDURE — 3017F COLORECTAL CA SCREEN DOC REV: CPT | Performed by: NURSE PRACTITIONER

## 2021-01-12 PROCEDURE — G8417 CALC BMI ABV UP PARAM F/U: HCPCS | Performed by: NURSE PRACTITIONER

## 2021-01-12 PROCEDURE — G8754 DIAS BP LESS 90: HCPCS | Performed by: NURSE PRACTITIONER

## 2021-01-12 PROCEDURE — G8536 NO DOC ELDER MAL SCRN: HCPCS | Performed by: NURSE PRACTITIONER

## 2021-01-12 PROCEDURE — G8432 DEP SCR NOT DOC, RNG: HCPCS | Performed by: NURSE PRACTITIONER

## 2021-01-12 PROCEDURE — G8400 PT W/DXA NO RESULTS DOC: HCPCS | Performed by: NURSE PRACTITIONER

## 2021-01-12 PROCEDURE — 1090F PRES/ABSN URINE INCON ASSESS: CPT | Performed by: NURSE PRACTITIONER

## 2021-01-12 PROCEDURE — G8752 SYS BP LESS 140: HCPCS | Performed by: NURSE PRACTITIONER

## 2021-01-12 PROCEDURE — G8427 DOCREV CUR MEDS BY ELIG CLIN: HCPCS | Performed by: NURSE PRACTITIONER

## 2021-01-12 NOTE — PROGRESS NOTES
Ly Allen is a 76 y.o. female     Chief Complaint   Patient presents with    Blood Pressure Check     3 wk follow up       Visit Vitals  /76 (BP 1 Location: Left arm, BP Patient Position: Sitting)   Pulse 84   Temp 98.7 °F (37.1 °C) (Temporal)   Resp 18   Ht 5' 5\" (1.651 m)   Wt (!) 404 lb 12.8 oz (183.6 kg)   SpO2 100%   BMI 67.36 kg/m²       Health Maintenance Due   Topic Date Due    DTaP/Tdap/Td series (1 - Tdap) 03/14/1975    Shingrix Vaccine Age 50> (1 of 2) 03/14/2004    GLAUCOMA SCREENING Q2Y  03/14/2019    Bone Densitometry (Dexa) Screening  03/14/2019    Breast Cancer Screen Mammogram  01/07/2021       1. Have you been to the ER, urgent care clinic since your last visit? Hospitalized since your last visit? No    2. Have you seen or consulted any other health care providers outside of the [address] [place] [address] since your last visit? Include any pap smears or colon screening.  No

## 2021-01-12 NOTE — PROGRESS NOTES
Subjective:  Ms. Denise Segal is a pleasant 79-year-old lady who comes in today for blood pressure. I saw her back three weeks ago, at which time she had been off her Hydrochlorothiazide in an attempt to evaluate her urinary frequency. While off the Hydrochlorothiazide, it really did not help her frequency and incontinence. She had requested to get back on her medication. She has done well since her last visit. She specifically today denies any headaches, dizziness or blurred vision. She denies chest pain or palpitations. She denies any shortness of breath, cough, wheezing, PND or orthopnea. Denies any ankle edema. Past Medical History:   Diagnosis Date    Abdominal pain 8/23/2017    Annual physical exam 8/23/2017    Arthritis     Chronic pain     Contact dermatitis due to cosmetics 8/23/2017    Cough 8/23/2017    Degenerative joint disease 8/23/2017    Dietary counseling 8/23/2017    DJD (degenerative joint disease) of knee 8/23/2017    Gallstones 8/23/2017    Gastroesophageal reflux disease with hiatal hernia 8/23/2017    GERD (gastroesophageal reflux disease)     Hip pain, right 8/23/2017    History of transfusion 8/23/2017    HTN (hypertension) 8/23/2017    Hypercholesteremia     Hyperlipidemia 8/23/2017    Hypertension     Hypokalemia 8/23/2017    Left thyroid nodule 8/23/2017    Leg swelling 8/23/2017    Lower abdominal pain 8/23/2017    Lumbar disc disease 8/23/2017    Menopause 8/23/2017    Morbid obesity ([address] 8/23/2017    Obesity (BMI 30-39. 9) 8/23/2017    Overactive bladder     Rash 8/23/2017    Rhinitis, allergic 8/23/2017    Screening for diabetes mellitus (DM) 8/23/2017    Screening for hypothyroidism 8/23/2017    Swelling of both lower extremities 8/23/2017    Tinea corporis 8/23/2017    Urinary incontinence 8/23/2017    Vitamin D deficiency 8/23/2017     Past Surgical History:   Procedure Laterality Date    COLONOSCOPY N/A 2/9/2018    COL[facility]into [facility]  11/12/2014         HX CATARACT REMOVAL Right     HX HEENT      [facility] HX ORTHOPAEDIC  2004    ELIGIO KNEE REPLACEMENT    HX TONSILLECTOMY  AS CHILD    HX TUBAL LIGATION      AL ABDOMEN SURGERY PROC UNLISTED      HERNIA       Current Outpatient Medications on File Prior to Visit   Medication Sig Dispense Refill    lisinopril-hydroCHLOROthiazide (PRINZIDE, ZESTORETIC) 10-12.5 mg per tablet TAKE 1 TABLET DAILY 90 Tab 3    RABEprazole (ACIPHEX) 20 mg tablet TAKE 1 TABLET BY MOUTH DAILY 90 Tab 3    potassium chloride SA (MICRO-K) 10 mEq capsule TAKE 2 CAPSULES DAILY 180 Cap 3    simvastatin (ZOCOR) 40 mg tablet Take 1 tab daily 90 Tab 3    diclofenac (VOLTAREN) 1 % gel Apply  to affected area four (4) times daily.  biotin (VITAMIN B7) 5 mg capsule Take 10 mg by mouth.  clotrimazole-betamethasone (LOTRISONE) topical cream Apply  to affected area two (2) times a day.  mirabegron ER (MYRBETRIQ) 25 mg ER tablet Take 25 mg by mouth daily.  ERGOCALCIFEROL, VITAMIN D2, (VITAMIN D2 PO) Take  by mouth.  omega-3 fatty acids-vitamin e (FISH OIL) 1,000 mg cap Take 1 Cap by mouth daily.  azithromycin (ZITHROMAX) 250 mg tablet Take 2 tablets initially then one tablet daily until completed 6 Tab 0     No current facility-administered medications on file prior to visit. Allergies   Allergen Reactions    Adhesive Tape-Silicones Unknown (comments)     Physical Examination:  GENERAL:  Pleasant, markedly obese lady in no acute distress. She is alert and oriented. She answers my questions appropriately. She ambulates with a cane. VITALS:  Blood Pressure:  132/76. Pulse:  84.  Respirations:  18.  Temperature:  98.7. O2 sat:  100. HEENT:  Normocephalic, atraumatic. NECK:  Supple without adenopathy, thyromegaly or carotid bruits. CHEST:  Lungs clear to auscultation, no rales or wheezes.   CV:  Heart regular rhythm without murmur or gallop. EXTREMITIES:  No edema or calf tenderness. Distal pulses were present. Impression:  1. Hypertension, stable. Plan:  1. She will continue with her current regimen and I will continue to see her yearly. 2. Once again we discussed weight loss, although she has remained unsuccessful.

## 2021-01-18 RX ORDER — SIMVASTATIN 40 MG/1
TABLET, FILM COATED ORAL
Qty: 90 TAB | Refills: 3 | Status: SHIPPED | OUTPATIENT
Start: 2021-01-18 | End: 2021-03-18 | Stop reason: SDUPTHER

## 2021-01-18 NOTE — TELEPHONE ENCOUNTER
PCP: Shila Sierra NP    Last appt: 1/12/2021  Future Appointments   Date Time Provider Brennon Duke   12/7/2021 10:30 AM Ravi Snyder NP [facility] BS AMB       Requested Prescriptions     Pending Prescriptions Disp Refills    simvastatin (ZOCOR) 40 mg tablet [Pharmacy Med Name: SIMVASTATIN 40MG TABLETS] 90 Tab 3     Sig: TAKE 1 TABLET BY MOUTH EVERY DAY       Prior labs and Blood pressures:  BP Readings from Last 3 Encounters:   01/12/21 132/76   12/23/20 (!) 142/90   11/24/20 132/74     Lab Results   Component Value Date/Time    Sodium 145 11/24/2020 11:23 AM    Potassium 4.4 11/24/2020 11:23 AM    Chloride 104 11/24/2020 11:23 AM    CO2 30.0 11/24/2020 11:23 AM    Anion gap 11 11/24/2020 11:23 AM    Glucose 96 11/24/2020 11:23 AM    BUN 17.0 11/24/2020 11:23 AM    Creatinine 0.7 11/24/2020 11:23 AM    BUN/Creatinine ratio 24 11/24/2020 11:23 AM    GFR est AA >60 11/24/2020 11:23 AM    GFR est non-AA >60 11/24/2020 11:23 AM    Calcium 10.1 11/24/2020 11:23 AM     Lab Results   Component Value Date/Time    Hemoglobin A1c 5.3 11/24/2020 11:23 AM     Lab Results   Component Value Date/Time    Cholesterol, total 186 11/24/2020 11:23 AM    HDL Cholesterol 53 11/24/2020 11:23 AM    LDL, calculated 120 11/24/2020 11:23 AM    VLDL, calculated 14 10/22/2018 11:20 AM    VLDL 13 11/24/2020 11:23 AM    Triglyceride 64 11/24/2020 11:23 AM    CHOL/HDL Ratio 4 11/24/2020 11:23 AM     Lab Results   Component Value Date/Time    VITAMIN D, 25-HYDROXY 29 (L) 11/24/2020 11:22 AM       Lab Results   Component Value Date/Time    TSH 2.570 10/22/2018 11:20 AM    TSH, 3rd generation 3.33 11/24/2020 11:22 AM

## 2021-03-02 ENCOUNTER — TELEPHONE (OUTPATIENT)
Dept: INTERNAL MEDICINE CLINIC | Age: 66
End: 2021-03-02

## 2021-03-02 NOTE — TELEPHONE ENCOUNTER
Spoke to patient and informed her that she is on the list of patient that want to get the Covid vaccine.

## 2021-03-02 NOTE — TELEPHONE ENCOUNTER
Patient called wanting to make sure she is on the [facility] list.  Brooklyn Luong: [phone]

## 2021-03-09 ENCOUNTER — IMMUNIZATION (OUTPATIENT)
Dept: INTERNAL MEDICINE CLINIC | Age: 66
End: 2021-03-09
Payer: MEDICARE

## 2021-03-09 DIAGNOSIS — Z23 ENCOUNTER FOR IMMUNIZATION: Primary | ICD-10-CM

## 2021-03-09 PROCEDURE — 91300 COVID-19, MRNA, LNP-S, PF, 30MCG/0.3ML DOSE(PFIZER): CPT | Performed by: FAMILY MEDICINE

## 2021-03-09 PROCEDURE — 0001A COVID-19, MRNA, LNP-S, PF, 30MCG/0.3ML DOSE(PFIZER): CPT | Performed by: FAMILY MEDICINE

## 2021-03-18 NOTE — TELEPHONE ENCOUNTER
PCP: Lowry Cranker, NP    Last appt: 1/12/2021  Future Appointments   Date Time Provider Brennon Duke   3/30/2021 10:10 AM [facility] COVID VAC 21 DAY [facility] AMB   12/7/2021 10:30 AM Huseyin Snyder, RIVKA [facility] AMB       Requested Prescriptions     Pending Prescriptions Disp Refills    RABEprazole (ACIPHEX) 20 mg TbEC 90 Tab 3     Sig: TAKE 1 TABLET BY MOUTH DAILY    simvastatin (ZOCOR) 40 mg tablet 90 Tab 3     Sig: TAKE 1 TABLET BY MOUTH EVERY DAY       Prior labs and Blood pressures:  BP Readings from Last 3 Encounters:   01/12/21 132/76   12/23/20 (!) 142/90   11/24/20 132/74     Lab Results   Component Value Date/Time    Sodium 145 11/24/2020 11:23 AM    Potassium 4.4 11/24/2020 11:23 AM    Chloride 104 11/24/2020 11:23 AM    CO2 30.0 11/24/2020 11:23 AM    Anion gap 11 11/24/2020 11:23 AM    Glucose 96 11/24/2020 11:23 AM    BUN 17.0 11/24/2020 11:23 AM    Creatinine 0.7 11/24/2020 11:23 AM    BUN/Creatinine ratio 24 11/24/2020 11:23 AM    GFR est AA >60 11/24/2020 11:23 AM    GFR est non-AA >60 11/24/2020 11:23 AM    Calcium 10.1 11/24/2020 11:23 AM     Lab Results   Component Value Date/Time    Hemoglobin A1c 5.3 11/24/2020 11:23 AM     Lab Results   Component Value Date/Time    Cholesterol, total 186 11/24/2020 11:23 AM    HDL Cholesterol 53 11/24/2020 11:23 AM    LDL, calculated 120 11/24/2020 11:23 AM    VLDL, calculated 14 10/22/2018 11:20 AM    VLDL 13 11/24/2020 11:23 AM    Triglyceride 64 11/24/2020 11:23 AM    CHOL/HDL Ratio 4 11/24/2020 11:23 AM     Lab Results   Component Value Date/Time    VITAMIN D, 25-HYDROXY 29 (L) 11/24/2020 11:22 AM       Lab Results   Component Value Date/Time    TSH 2.570 10/22/2018 11:20 AM    TSH, 3rd generation 3.33 11/24/2020 11:22 AM

## 2021-03-19 RX ORDER — RABEPRAZOLE SODIUM 20 MG/1
TABLET, DELAYED RELEASE ORAL
Qty: 90 TAB | Refills: 3 | Status: SHIPPED | OUTPATIENT
Start: 2021-03-19 | End: 2021-04-05 | Stop reason: SDUPTHER

## 2021-03-19 RX ORDER — SIMVASTATIN 40 MG/1
TABLET, FILM COATED ORAL
Qty: 90 TAB | Refills: 3 | Status: SHIPPED | OUTPATIENT
Start: 2021-03-19 | End: 2021-04-02 | Stop reason: SDUPTHER

## 2021-03-30 ENCOUNTER — IMMUNIZATION (OUTPATIENT)
Dept: INTERNAL MEDICINE CLINIC | Age: 66
End: 2021-03-30
Payer: MEDICARE

## 2021-03-30 DIAGNOSIS — Z23 ENCOUNTER FOR IMMUNIZATION: Primary | ICD-10-CM

## 2021-03-30 PROCEDURE — 0002A COVID-19, MRNA, LNP-S, PF, 30MCG/0.3ML DOSE(PFIZER): CPT | Performed by: FAMILY MEDICINE

## 2021-03-30 PROCEDURE — 91300 COVID-19, MRNA, LNP-S, PF, 30MCG/0.3ML DOSE(PFIZER): CPT | Performed by: FAMILY MEDICINE

## 2021-04-02 NOTE — TELEPHONE ENCOUNTER
Last Refill: 03/19/2021  Last Visit: 1/12/2021   Next Visit: Visit date not found    Requested Prescriptions     Pending Prescriptions Disp Refills    simvastatin (ZOCOR) 40 mg tablet 90 Tab 3     Sig: TAKE 1 TABLET BY MOUTH EVERY DAY

## 2021-04-04 RX ORDER — SIMVASTATIN 40 MG/1
TABLET, FILM COATED ORAL
Qty: 90 TAB | Refills: 3 | Status: SHIPPED | OUTPATIENT
Start: 2021-04-04 | End: 2021-08-30 | Stop reason: SDUPTHER

## 2021-04-05 RX ORDER — RABEPRAZOLE SODIUM 20 MG/1
TABLET, DELAYED RELEASE ORAL
Qty: 90 TAB | Refills: 3 | Status: SHIPPED | OUTPATIENT
Start: 2021-04-05 | End: 2021-08-30 | Stop reason: SDUPTHER

## 2021-04-05 RX ORDER — HYDROCHLOROTHIAZIDE 12.5 MG/1
12.5 TABLET ORAL DAILY
Qty: 90 TAB | Refills: 1 | Status: SHIPPED | OUTPATIENT
Start: 2021-04-05 | End: 2021-08-30 | Stop reason: ALTCHOICE

## 2021-04-05 NOTE — TELEPHONE ENCOUNTER
Last Refill:    Rabeprazole: 3/19/2021   Hydrochlorothiazide: 11/25/2020  Last Visit: 1/12/2021   Next Visit: 12/7/2021    Requested Prescriptions     Pending Prescriptions Disp Refills    RABEprazole (ACIPHEX) 20 mg TbEC 90 Tab 3     Sig: TAKE 1 TABLET BY MOUTH DAILY    hydroCHLOROthiazide (HYDRODIURIL) 12.5 mg tablet 90 Tab 1     Sig: Take 1 Tab by mouth daily.

## 2021-08-30 RX ORDER — SIMVASTATIN 40 MG/1
TABLET, FILM COATED ORAL
Qty: 90 TABLET | Refills: 3 | Status: SHIPPED | OUTPATIENT
Start: 2021-08-30 | End: 2022-04-01

## 2021-08-30 RX ORDER — LISINOPRIL AND HYDROCHLOROTHIAZIDE 10; 12.5 MG/1; MG/1
TABLET ORAL
Qty: 90 TABLET | Refills: 3 | Status: SHIPPED | OUTPATIENT
Start: 2021-08-30 | End: 2021-09-20

## 2021-08-30 RX ORDER — RABEPRAZOLE SODIUM 20 MG/1
TABLET, DELAYED RELEASE ORAL
Qty: 90 TABLET | Refills: 3 | Status: SHIPPED | OUTPATIENT
Start: 2021-08-30 | End: 2022-02-28 | Stop reason: SDUPTHER

## 2021-08-30 NOTE — TELEPHONE ENCOUNTER
Last Refill: 12/29/2020, 04/05/21, 04/04/21  Last Visit: Visit date not found   Next Visit: 12/7/2021    Requested Prescriptions     Pending Prescriptions Disp Refills    lisinopril-hydroCHLOROthiazide (PRINZIDE, ZESTORETIC) 10-12.5 mg per tablet 90 Tablet 3     Sig: TAKE 1 TABLET DAILY    simvastatin (ZOCOR) 40 mg tablet 90 Tablet 3     Sig: TAKE 1 TABLET BY MOUTH EVERY DAY    RABEprazole (ACIPHEX) 20 mg TbEC 90 Tablet 3     Sig: TAKE 1 TABLET BY MOUTH DAILY

## 2021-09-20 RX ORDER — LISINOPRIL AND HYDROCHLOROTHIAZIDE 10; 12.5 MG/1; MG/1
TABLET ORAL
Qty: 90 TABLET | Refills: 3 | Status: SHIPPED | OUTPATIENT
Start: 2021-09-20 | End: 2022-08-29 | Stop reason: SDUPTHER

## 2021-12-07 ENCOUNTER — OFFICE VISIT (OUTPATIENT)
Dept: INTERNAL MEDICINE CLINIC | Age: 66
End: 2021-12-07
Payer: MEDICARE

## 2021-12-07 VITALS
HEART RATE: 88 BPM | DIASTOLIC BLOOD PRESSURE: 78 MMHG | TEMPERATURE: 97.7 F | RESPIRATION RATE: 18 BRPM | OXYGEN SATURATION: 100 % | SYSTOLIC BLOOD PRESSURE: 130 MMHG | WEIGHT: 293 LBS | HEIGHT: 65 IN | BODY MASS INDEX: 48.82 KG/M2

## 2021-12-07 DIAGNOSIS — G89.29 CHRONIC PAIN OF BOTH KNEES: ICD-10-CM

## 2021-12-07 DIAGNOSIS — E78.2 MIXED HYPERLIPIDEMIA: ICD-10-CM

## 2021-12-07 DIAGNOSIS — I10 ESSENTIAL HYPERTENSION: ICD-10-CM

## 2021-12-07 DIAGNOSIS — M25.562 CHRONIC PAIN OF BOTH KNEES: ICD-10-CM

## 2021-12-07 DIAGNOSIS — M25.561 CHRONIC PAIN OF BOTH KNEES: ICD-10-CM

## 2021-12-07 DIAGNOSIS — Z23 ENCOUNTER FOR IMMUNIZATION: ICD-10-CM

## 2021-12-07 DIAGNOSIS — Z23 NEEDS FLU SHOT: Primary | ICD-10-CM

## 2021-12-07 DIAGNOSIS — E66.01 CLASS 3 SEVERE OBESITY DUE TO EXCESS CALORIES WITHOUT SERIOUS COMORBIDITY WITH BODY MASS INDEX (BMI) OF 60.0 TO 69.9 IN ADULT (HCC): ICD-10-CM

## 2021-12-07 DIAGNOSIS — Z00.00 MEDICARE ANNUAL WELLNESS VISIT, SUBSEQUENT: ICD-10-CM

## 2021-12-07 DIAGNOSIS — R73.9 HYPERGLYCEMIA: ICD-10-CM

## 2021-12-07 DIAGNOSIS — R53.83 FATIGUE, UNSPECIFIED TYPE: ICD-10-CM

## 2021-12-07 DIAGNOSIS — E55.9 VITAMIN D DEFICIENCY: ICD-10-CM

## 2021-12-07 LAB
COMMENT, HOLDF: NORMAL
SAMPLES BEING HELD,HOLD: NORMAL

## 2021-12-07 PROCEDURE — 99213 OFFICE O/P EST LOW 20 MIN: CPT | Performed by: NURSE PRACTITIONER

## 2021-12-07 PROCEDURE — G8756 NO BP MEASURE DOC: HCPCS | Performed by: NURSE PRACTITIONER

## 2021-12-07 PROCEDURE — 90694 VACC AIIV4 NO PRSRV 0.5ML IM: CPT | Performed by: NURSE PRACTITIONER

## 2021-12-07 PROCEDURE — G8400 PT W/DXA NO RESULTS DOC: HCPCS | Performed by: NURSE PRACTITIONER

## 2021-12-07 PROCEDURE — 1090F PRES/ABSN URINE INCON ASSESS: CPT | Performed by: NURSE PRACTITIONER

## 2021-12-07 PROCEDURE — G0008 ADMIN INFLUENZA VIRUS VAC: HCPCS | Performed by: NURSE PRACTITIONER

## 2021-12-07 PROCEDURE — G8432 DEP SCR NOT DOC, RNG: HCPCS | Performed by: NURSE PRACTITIONER

## 2021-12-07 PROCEDURE — G8536 NO DOC ELDER MAL SCRN: HCPCS | Performed by: NURSE PRACTITIONER

## 2021-12-07 PROCEDURE — G0439 PPPS, SUBSEQ VISIT: HCPCS | Performed by: NURSE PRACTITIONER

## 2021-12-07 PROCEDURE — G8427 DOCREV CUR MEDS BY ELIG CLIN: HCPCS | Performed by: NURSE PRACTITIONER

## 2021-12-07 PROCEDURE — G8417 CALC BMI ABV UP PARAM F/U: HCPCS | Performed by: NURSE PRACTITIONER

## 2021-12-07 PROCEDURE — 3017F COLORECTAL CA SCREEN DOC REV: CPT | Performed by: NURSE PRACTITIONER

## 2021-12-07 PROCEDURE — 1101F PT FALLS ASSESS-DOCD LE1/YR: CPT | Performed by: NURSE PRACTITIONER

## 2021-12-07 NOTE — PATIENT INSTRUCTIONS
Vaccine Information Statement    Influenza (Flu) Vaccine (Inactivated or Recombinant): What You Need to Know    Many vaccine information statements are available in [place] and other languages. See www.immunize.org/vis. Hojas de información sobre vacunas están disponibles en español y en muchos otros idiomas. Visite www.immunize.org/vis. 1. Why get vaccinated? Influenza vaccine can prevent influenza (flu). Flu is a contagious disease that spreads around the [facility] every year, usually between October and May. Anyone can get the flu, but it is more dangerous for some people. Infants and young children, people 72 years and older, pregnant people, and people with certain health conditions or a weakened immune system are at greatest risk of flu complications. Pneumonia, bronchitis, sinus infections, and ear infections are examples of flu-related complications. If you have a medical condition, such as heart disease, cancer, or diabetes, flu can make it worse. Flu can cause fever and chills, sore throat, muscle aches, fatigue, cough, headache, and runny or stuffy nose. Some people may have vomiting and diarrhea, though this is more common in children than adults. In an average year, thousands of people in the [facility] die from flu, and many more are hospitalized. Flu vaccine prevents millions of illnesses and flu-related visits to the doctor each year. 2. Influenza vaccines     CDC recommends everyone 6 months and older get vaccinated every flu season. Children 6 months through 6years of age may need 2 doses during a single flu season. Everyone else needs only 1 dose each flu season. It takes about 2 weeks for protection to develop after vaccination. There are many flu viruses, and they are always changing. Each year a new flu vaccine is made to protect against the influenza viruses believed to be likely to cause disease in the upcoming flu season.  Even when the vaccine doesnt exactly match these viruses, it may still provide some protection. Influenza vaccine does not cause flu. Influenza vaccine may be given at the same time as other vaccines. 3. Talk with your health care provider    Tell your vaccination provider if the person getting the vaccine:   Has had an allergic reaction after a previous dose of influenza vaccine, or has any severe, life-threatening allergies    Has ever had Guillain-Barré Syndrome (also called GBS)    In some cases, your health care provider may decide to postpone influenza vaccination until a future visit. Influenza vaccine can be administered at any time during pregnancy. People who are or will be pregnant during influenza season should receive inactivated influenza vaccine. People with minor illnesses, such as a cold, may be vaccinated. People who are moderately or severely ill should usually wait until they recover before getting influenza vaccine. Your health care provider can give you more information. 4. Risks of a vaccine reaction     Soreness, redness, and swelling where the shot is given, fever, muscle aches, and headache can happen after influenza vaccination.  There may be a very small increased risk of Guillain-Barré Syndrome (GBS) after inactivated influenza vaccine (the flu shot). Leoma March children who get the flu shot along with pneumococcal vaccine (PCV13) and/or DTaP vaccine at the same time might be slightly more likely to have a seizure caused by fever. Tell your health care provider if a child who is getting flu vaccine has ever had a seizure. People sometimes faint after medical procedures, including vaccination. Tell your provider if you feel dizzy or have vision changes or ringing in the ears. As with any medicine, there is a very remote chance of a vaccine causing a severe allergic reaction, other serious injury, or death. 5. What if there is a serious problem?     An allergic reaction could occur after the vaccinated person leaves the clinic. If you see signs of a severe allergic reaction (hives, swelling of the face and throat, difficulty breathing, a fast heartbeat, dizziness, or weakness), call 9-1-1 and get the person to the nearest hospital.    For other signs that concern you, call your health care provider. Adverse reactions should be reported to the Vaccine Adverse Event Reporting System (VAERS). Your health care provider will usually file this report, or you can do it yourself. Visit the VAERS website at www.vaers. [facility].gov or call 0-531.821.9413. VAERS is only for reporting reactions, and VAERS staff members do not give medical advice. 6. The National Vaccine Injury Compensation Program    The [facility] Vaccine Injury Compensation Program (VICP) is a federal program that was created to compensate people who may have been injured by certain vaccines. Claims regarding alleged injury or death due to vaccination have a time limit for filing, which may be as short as two years. Visit the VICP website at www.[facility].gov/vaccinecompensation or call 5-804.858.8008 to learn about the program and about filing a claim. 7. How can I learn more?  Ask your health care provider.  Call your local or state health department.  Visit the website of the Food and Drug Administration (FDA) for vaccine package inserts and additional information at www.fda.gov/vaccines-blood-biologics/vaccines.  Contact the Centers for Disease Control and Prevention (CDC):  - Call 3-809.842.3306 (1-800-CDC-INFO) or  - Visit CDCs influenza website at www.cdc.gov/flu. Vaccine Information Statement   Inactivated Influenza Vaccine   8/6/2021  41 WILLIAM Rhonda Victoria 127WS-17   Department of Health and Human Services  Centers for Disease Control and Prevention    Office Use Only      Medicare Wellness Visit, Female     The best way to live healthy is to have a lifestyle where you eat a well-balanced diet, exercise regularly, limit alcohol use, and quit all forms of tobacco/nicotine, if applicable. Regular preventive services are another way to keep healthy. Preventive services (vaccines, screening tests, monitoring & exams) can help personalize your care plan, which helps you manage your own care. Screening tests can find health problems at the earliest stages, when they are easiest to treat. [facility] follows the current, evidence-based guidelines published by the [facility] ([facility]) when recommending preventive services for our patients. Because we follow these guidelines, sometimes recommendations change over time as research supports it. (For example, mammograms used to be recommended annually. Even though Medicare will still pay for an annual mammogram, the newer guidelines recommend a mammogram every two years for women of average risk). Of course, you and your doctor may decide to screen more often for some diseases, based on your risk and your co-morbidities (chronic disease you are already diagnosed with). Preventive services for you include:  - Medicare offers their members a free annual wellness visit, which is time for you and your primary care provider to discuss and plan for your preventive service needs. Take advantage of this benefit every year!  -All adults over the age of 72 should receive the recommended pneumonia vaccines. Current USPSTF guidelines recommend a series of two vaccines for the best pneumonia protection.   -All adults should have a flu vaccine yearly and a tetanus vaccine every 10 years.   -All adults age 48 and older should receive the shingles vaccines (series of two vaccines).       -All adults age 38-68 who are overweight should have a diabetes screening test once every three years.   -All adults born between [birth year] and 1965 should be screened once for Hepatitis C.  -Other screening tests and preventive services for persons with diabetes include: an eye exam to screen for diabetic retinopathy, a kidney function test, a foot exam, and stricter control over your cholesterol.   -Cardiovascular screening for adults with routine risk involves an electrocardiogram (ECG) at intervals determined by your doctor.   -Colorectal cancer screenings should be done for adults age 54-65 with no increased risk factors for colorectal cancer. There are a number of acceptable methods of screening for this type of cancer. Each test has its own benefits and drawbacks. Discuss with your doctor what is most appropriate for you during your annual wellness visit. The different tests include: colonoscopy (considered the best screening method), a fecal occult blood test, a fecal DNA test, and sigmoidoscopy.    -A bone mass density test is recommended when a woman turns 65 to screen for osteoporosis. This test is only recommended one time, as a screening. Some providers will use this same test as a disease monitoring tool if you already have osteoporosis. -Breast cancer screenings are recommended every other year for women of normal risk, age 54-69.  -Cervical cancer screenings for women over age 72 are only recommended with certain risk factors. Here is a list of your current Health Maintenance items (your personalized list of preventive services) with a due date:  Health Maintenance Due   Topic Date Due    DTaP/Tdap/Td  (1 - Tdap) Never done    Shingles Vaccine (1 of 2) Never done    Bone Mineral Density   Never done    Mammogram  01/07/2021    COVID-19 Vaccine (3 - Booster for Pfizer series) 09/30/2021    Pneumococcal Vaccine (2 of 2 - PPSV23) 11/24/2021    Cholesterol Test   11/24/2021            Body Mass Index: Care Instructions  Your Care Instructions     Body mass index (BMI) can help you see if your weight is raising your risk for health problems. It uses a formula to compare how much you weigh with how tall you are.   · A BMI lower than 18.5 is considered underweight. · A BMI between 18.5 and 24.9 is considered healthy. · A BMI between 25 and 29.9 is considered overweight. A BMI of 30 or higher is considered obese. If your BMI is in the normal range, it means that you have a lower risk for weight-related health problems. If your BMI is in the overweight or obese range, you may be at increased risk for weight-related health problems, such as high blood pressure, heart disease, stroke, arthritis or joint pain, and diabetes. If your BMI is in the underweight range, you may be at increased risk for health problems such as fatigue, lower protection (immunity) against illness, muscle loss, bone loss, hair loss, and hormone problems. BMI is just one measure of your risk for weight-related health problems. You may be at higher risk for health problems if you are not active, you eat an unhealthy diet, or you drink too much alcohol or use tobacco products. Follow-up care is a key part of your treatment and safety. Be sure to make and go to all appointments, and call your doctor if you are having problems. It's also a good idea to know your test results and keep a list of the medicines you take. How can you care for yourself at home? · Practice healthy eating habits. This includes eating plenty of fruits, vegetables, whole grains, lean protein, and low-fat dairy. · If your doctor recommends it, get more exercise. Walking is a good choice. Bit by bit, increase the amount you walk every day. Try for at least 30 minutes on most days of the week. · Do not smoke. Smoking can increase your risk for health problems. If you need help quitting, talk to your doctor about stop-smoking programs and medicines. These can increase your chances of quitting for good. · Limit alcohol to 2 drinks a day for men and 1 drink a day for women. Too much alcohol can cause health problems.   If you have a BMI higher than 25  · Your doctor may do other tests to check your risk for weight-related health problems. This may include measuring the distance around your waist. A waist measurement of more than 40 inches in men or 35 inches in women can increase the risk of weight-related health problems. · Talk with your doctor about steps you can take to stay healthy or improve your health. You may need to make lifestyle changes to lose weight and stay healthy, such as changing your diet and getting regular exercise. If you have a BMI lower than 18.5  · Your doctor may do other tests to check your risk for health problems. · Talk with your doctor about steps you can take to stay healthy or improve your health. You may need to make lifestyle changes to gain or maintain weight and stay healthy, such as getting more healthy foods in your diet and doing exercises to build muscle. Where can you learn more? Go to http://www.bonilla.com/  Enter S176 in the search box to learn more about \"Body Mass Index: Care Instructions. \"  Current as of: March 17, 2021               Content Version: 13.0  © 2006-2021 [employer]. Care instructions adapted under license by [employer] (which disclaims liability or warranty for this information). If you have questions about a medical condition or this instruction, always ask your healthcare professional. [address] any warranty or liability for your use of this information.

## 2021-12-07 NOTE — PROGRESS NOTES
Elroy Mendez is a 78 y.o. female     Chief Complaint   Patient presents with    Annual Wellness Visit     medicare wellness/cpe       Visit Vitals  /78 (BP 1 Location: Left arm, BP Patient Position: Sitting, BP Cuff Size: Adult)   Pulse 88   Temp 97.7 °F (36.5 °C) (Temporal)   Resp 18   Ht 5' 5\" (1.651 m)   Wt (!) 409 lb 3.2 oz (185.6 kg)   SpO2 100%   BMI 68.09 kg/m²       Health Maintenance Due   Topic Date Due    DTaP/Tdap/Td series (1 - Tdap) Never done    Shingrix Vaccine Age 50> (1 of 2) Never done    Bone Densitometry (Dexa) Screening  Never done    Breast Cancer Screen Mammogram  01/07/2021    Flu Vaccine (1) 09/01/2021    COVID-19 Vaccine (3 - Booster for Pfizer series) 09/30/2021    Pneumococcal 65+ years (2 of 2 - PPSV23) 11/24/2021    Lipid Screen  11/24/2021    Medicare Yearly Exam  11/25/2021       1. Have you been to the ER, urgent care clinic since your last visit? Hospitalized since your last visit? No     2. Have you seen or consulted any other health care providers outside of the [address] [place] [address] since your last visit? Include any pap smears or colon screening. No     Administered influenza vaccine in left deltoid patient tolerated injection well. DYP#:[id number]    Exp:JUNE 30 2022    ND:[id number]    Administered Pneumovax 23 in right deltoid patient tolerated injection well.     PQN#:[id number]    Exp:OCT 31 2022    IEN:[id number]

## 2021-12-07 NOTE — PROGRESS NOTES
This is the Subsequent Medicare Annual Wellness Exam, performed 12 months or more after the Initial AWV or the last Subsequent AWV    I have reviewed the patient's medical history in detail and updated the computerized patient record. Assessment/Plan   Education and counseling provided:  Are appropriate based on today's review and evaluation    1. Needs flu shot  -     FLU (FLUAD QUAD INFLUENZA VACCINE,QUAD,ADJUVANTED)  2. Encounter for immunization  -     PNEUMOCOCCAL POLYSACCHARIDE VACCINE, 23-VALENT, ADULT OR IMMUNOSUPPRESSED PT DOSE,  3. Essential hypertension  -     CBC WITH AUTOMATED DIFF; Future  -     METABOLIC PANEL, COMPREHENSIVE; Future  -     URINALYSIS W/ RFLX MICROSCOPIC; Future  4. Class 3 severe obesity due to excess calories without serious comorbidity with body mass index (BMI) of 60.0 to 69.9 in adult ([facility] [address]  5. Hyperglycemia  -     HEMOGLOBIN A1C WITH EAG; Future  6. Fatigue, unspecified type  -     TSH 3RD GENERATION; Future  -     T4, FREE; Future  7. Vitamin D deficiency  -     VITAMIN D, 25 HYDROXY; Future  8. Mixed hyperlipidemia  -     LIPID PANEL; Future       Depression Risk Factor Screening     3 most recent PHQ Screens 12/7/2021   Little interest or pleasure in doing things Not at all   Feeling down, depressed, irritable, or hopeless Not at all   Total Score PHQ 2 0       Alcohol Risk Screen    Do you average more than 1 drink per night or more than 7 drinks a week:  No    On any one occasion in the past three months have you have had more than 3 drinks containing alcohol:  No        Functional Ability and Level of Safety    Hearing: Hearing is good. Activities of Daily Living: The home contains: grab bars  Patient does total self care      Ambulation: with no difficulty     Fall Risk:  Fall Risk Assessment, last 12 mths 12/7/2021   Able to walk? Yes   Fall in past 12 months? 0   Do you feel unsteady?  0   Are you worried about falling 0      Abuse Screen:  Patient is not abused       Cognitive Screening    Has your family/caregiver stated any concerns about your memory: no     Cognitive Screening: Normal - MMSE (Mini Mental Status Exam)    Health Maintenance Due     Health Maintenance Due   Topic Date Due    DTaP/Tdap/Td series (1 - Tdap) Never done    Shingrix Vaccine Age 50> (1 of 2) Never done    Bone Densitometry (Dexa) Screening  Never done    Breast Cancer Screen Mammogram  01/07/2021    COVID-19 Vaccine (3 - Booster for Pfizer series) 09/30/2021    Pneumococcal 65+ years (2 of 2 - PPSV23) 11/24/2021    Lipid Screen  11/24/2021    Medicare Yearly Exam  11/25/2021       Patient Care Team   Patient Care Team:  Faith Wang NP as PCP - General (Nurse Practitioner)  Faith Wang NP as PCP - [address] [place] [address]  [facility] Provider  Jami Arcos MD as Surgeon (General Surgery)    History     Patient Active Problem List   Diagnosis Code    Left knee DJD M17.12    Contact dermatitis due to cosmetics L25.0    Dietary counseling Z71.3    DJD (degenerative joint disease) of knee M17.10    Gallstones K80.20    Gastroesophageal reflux disease with hiatal hernia K21.9, K44.9    Headache R51.9    Hip pain, right M25.551    HTN (hypertension) I10    Hyperlipidemia E78.5    Hypokalemia E87.6    Left thyroid nodule E04.1    Leg swelling M79.89    Lower abdominal pain R10.30    Lumbar disc disease M51.9    Menopause Z78.0    Morbid obesity ([address] E66.01    Obesity (BMI 30-39. 9) E66.9    Rash R21    Rhinitis, allergic J30.9    Swelling of both lower extremities M79.89    Tinea corporis B35.4    Urinary incontinence R32    Vitamin D deficiency E55.9    Abdominal pain R10.9    Cough R05.9    History of transfusion Z92.89    Degenerative joint disease M19.90     Past Medical History:   Diagnosis Date    Abdominal pain 8/23/2017    Annual physical exam 8/23/2017    Arthritis     Chronic pain     Contact dermatitis due to cosmetics 8/23/2017    Cough 8/23/2017    Degenerative joint disease 8/23/2017    Dietary counseling 8/23/2017    DJD (degenerative joint disease) of knee 8/23/2017    Gallstones 8/23/2017    Gastroesophageal reflux disease with hiatal hernia 8/23/2017    GERD (gastroesophageal reflux disease)     Hip pain, right 8/23/2017    History of transfusion 8/23/2017    HTN (hypertension) 8/23/2017    Hypercholesteremia     Hyperlipidemia 8/23/2017    Hypertension     Hypokalemia 8/23/2017    Left thyroid nodule 8/23/2017    Leg swelling 8/23/2017    Lower abdominal pain 8/23/2017    Lumbar disc disease 8/23/2017    Menopause 8/23/2017    Morbid obesity ([address] 8/23/2017    Obesity (BMI 30-39. 9) 8/23/2017    Overactive bladder     Rash 8/23/2017    Rhinitis, allergic 8/23/2017    Screening for diabetes mellitus (DM) 8/23/2017    Screening for hypothyroidism 8/23/2017    Swelling of both lower extremities 8/23/2017    Tinea corporis 8/23/2017    Urinary incontinence 8/23/2017    Vitamin D deficiency 8/23/2017      Past Surgical History:   Procedure Laterality Date    COLONOSCOPY N/A 2/9/2018    COLONOSCOPY performed by Kristen Rogers MD at [address] [place] [address] Dr  11/12/2014         HX CATARACT REMOVAL Right     HX HEENT      NODULES-VOCAL CORD    HX ORTHOPAEDIC  2004    ELIGIO KNEE REPLACEMENT    HX TONSILLECTOMY  AS CHILD    HX TUBAL LIGATION      [place] ABDOMEN SURGERY PROC UNLISTED      HERNIA     Current Outpatient Medications   Medication Sig Dispense Refill    lisinopril-hydroCHLOROthiazide (PRINZIDE, ZESTORETIC) 10-12.5 mg per tablet TAKE 1 TABLET DAILY 90 Tablet 3    simvastatin (ZOCOR) 40 mg tablet TAKE 1 TABLET BY MOUTH EVERY DAY 90 Tablet 3    RABEprazole (ACIPHEX) 20 mg TbEC TAKE 1 TABLET BY MOUTH DAILY 90 Tablet 3    potassium chloride SA (MICRO-K) 10 mEq capsule TAKE 2 CAPSULES DAILY 180 Cap 3    biotin (VITAMIN B7) 5 mg capsule Take 10 mg by mouth.       clotrimazole-betamethasone (LOTRISONE) topical cream Apply  to affected area two (2) times a day.  mirabegron ER (MYRBETRIQ) 25 mg ER tablet Take 25 mg by mouth daily.  ERGOCALCIFEROL, VITAMIN D2, (VITAMIN D2 PO) Take  by mouth.  omega-3 fatty acids-vitamin e (FISH OIL) 1,000 mg cap Take 1 Cap by mouth daily.  azithromycin (ZITHROMAX) 250 mg tablet Take 2 tablets initially then one tablet daily until completed (Patient not taking: Reported on 12/7/2021) 6 Tab 0    diclofenac (VOLTAREN) 1 % gel Apply  to affected area four (4) times daily. (Patient not taking: Reported on 12/7/2021)       Allergies   Allergen Reactions    Adhesive Tape-Silicones Unknown (comments)       Family History   Problem Relation Age of Onset    Diabetes Mother     Cancer Father     Hypertension Sister     Hypertension Brother     Heart Disease Brother      Social History     Tobacco Use    Smoking status: Never Smoker    Smokeless tobacco: Never Used   Substance Use Topics    Alcohol use: No      Subjective:  Ms. Elliott Madrigal is a pleasant 19-year-old lady, who comes in today for updated history and physical and Medicare wellness. Further discussion with her revealed that she really continues to struggle with her weight. when last seen about a year ago, she was approximately 404 pounds and now she is almost at 410 pounds. She is having some difficulty getting around. In the past she has had bilateral total knee arthroplasty with subsequent revision on her left knee. She sees Dr. Jozef Anderson at [facility]. In the past we did discuss bariatric surgery, but she flatly declined. Past Medical History/Surgeries:    1. Tonsillectomy. 2. Bilateral tubal ligation. 3. Repair of umbilical hernia. 4. Total knee arthroplasty with subsequent revision on the left as noted previously. 5. Excision of benign vocal cord nodule. 6. Left cataract extraction. Illnesses:  1. Hypertension. 2. Urinary stress incontinence. 3. Hyperlipidemia.   4. GERD.  5. Bilateral knee pain. 6. Morbid obesity with BMI of 68.09. Family History:  Father is , she was unsure of the cause. Mother  at 80 from complications of diabetes, she was hypertensive. Several siblings are living. Social History:  She is . She is retired. She has two sons living and well. Allergies:  She has allergies to adhesive tape and silicone. Medications:  1. Lisinopril 10/12.5 mg daily. 2. Simvastatin 40 mg daily. 3. Aciphex 40 mg daily. 4. Potassium chloride 10 mEq daily. 5. Voltaren Gel 1% four times a day as needed. 6. Lotrisone cream twice daily as needed. 7. Biotin 10 mg daily. 8. Fish oil 1,000 mg daily. 9. Vitamin D 1,000 units daily. Habits:  Nonsmoker and a non drinker. Review of Systems:  HEENT:  Denies any headaches, dizziness or blurred vision. She is up to date with her eye exam that is done through the [facility]. CVR:  Denies any chest pain or palpitations. Denies any syncopal episode. She denies any shortness of breath, cough, wheezing, hemoptysis, PND or orthopnea. She does note occasional ankle edema. GI:  Appetite is good, weight is persistently going up as noted previously. Stools are normal.  :  She notes urinary incontinence, for which she sees Dr. Yamil Dominguez at the [facility]. Immunizations:  She has completed her COVID vaccination, as well as Shingrix vaccination. Physical Examination:  GENERAL:  Pleasant, markedly obese lady in no acute distress. She is alert and oriented. She answers my questions appropriately. She is unable to get on the examining table. VITALS:  Blood Pressure:  130/78. Pulse:  88.  Respirations:  18.  Temperature:  97.7. O2 sat:100 % . Weight:  409.2 pounds. HEENT:  Normocephalic, atraumatic. PERRLA, EOMI. TMs normal.  Mouth mucosa pink. Tongue midline. Pharynx normal.  NECK:  Supple without adenopathy, thyromegaly or carotid bruits.   CHEST:  Lungs clear to auscultation, no rales or wheezes. CV:  Heart regular rhythm without murmur or gallop. ABDOMEN/BREASTS:  Exam deferred since she could not get on the examining table. EXTREMITIES:  Trace pitting edema in both ankles. No calf tenderness. Distal pulses were present. NEUROLOGIC:  Cranial nerves II-XII intact. Excellent strength in the upper and lower extremities against resistance. Sensation is preserved. Romberg is negative. She had good coordination. Impression:  1. Hypertension, stable. 2. Hyperlipidemia. 3. GERD. 4. Morbid obesity with BMI of 68.09.  5. Bilateral knee pain. Plan:  1. She was fasting this morning, so it was opted to do CBC, comprehensive metabolic, lipid profile and A1c. I will call her as soon as I have her results. 2. Once again we discussed weight loss, which unfortunately she has been unsuccessful at. She declined a referral to the dietitian. 3. While in the office today we went ahead and gave her a flu vaccination, as well as Pneumovax 13. She tolerated this well.         Trevon Montague NP

## 2021-12-08 LAB
25(OH)D3 SERPL-MCNC: 28.2 NG/ML (ref 30–100)
ALBUMIN SERPL-MCNC: 3.9 G/DL (ref 3.5–5)
ALBUMIN/GLOB SERPL: 0.9 {RATIO} (ref 1.1–2.2)
ALP SERPL-CCNC: 123 U/L (ref 45–117)
ALT SERPL-CCNC: 16 U/L (ref 12–78)
ANION GAP SERPL CALC-SCNC: 4 MMOL/L (ref 5–15)
APPEARANCE UR: CLEAR
AST SERPL-CCNC: 10 U/L (ref 15–37)
BASOPHILS # BLD: 0 K/UL (ref 0–0.1)
BASOPHILS NFR BLD: 1 % (ref 0–1)
BILIRUB SERPL-MCNC: 0.8 MG/DL (ref 0.2–1)
BILIRUB UR QL: NEGATIVE
BUN SERPL-MCNC: 16 MG/DL (ref 6–20)
BUN/CREAT SERPL: 23 (ref 12–20)
CALCIUM SERPL-MCNC: 9.7 MG/DL (ref 8.5–10.1)
CHLORIDE SERPL-SCNC: 107 MMOL/L (ref 97–108)
CHOLEST SERPL-MCNC: 182 MG/DL
CO2 SERPL-SCNC: 28 MMOL/L (ref 21–32)
COLOR UR: NORMAL
CREAT SERPL-MCNC: 0.7 MG/DL (ref 0.55–1.02)
DIFFERENTIAL METHOD BLD: NORMAL
EOSINOPHIL # BLD: 0.2 K/UL (ref 0–0.4)
EOSINOPHIL NFR BLD: 4 % (ref 0–7)
ERYTHROCYTE [DISTWIDTH] IN BLOOD BY AUTOMATED COUNT: 14.3 % (ref 11.5–14.5)
EST. AVERAGE GLUCOSE BLD GHB EST-MCNC: 103 MG/DL
GLOBULIN SER CALC-MCNC: 4.2 G/DL (ref 2–4)
GLUCOSE SERPL-MCNC: 92 MG/DL (ref 65–100)
GLUCOSE UR STRIP.AUTO-MCNC: NEGATIVE MG/DL
HBA1C MFR BLD: 5.2 % (ref 4–5.6)
HCT VFR BLD AUTO: 39.9 % (ref 35–47)
HDLC SERPL-MCNC: 53 MG/DL
HDLC SERPL: 3.4 {RATIO} (ref 0–5)
HGB BLD-MCNC: 12.2 G/DL (ref 11.5–16)
HGB UR QL STRIP: NEGATIVE
IMM GRANULOCYTES # BLD AUTO: 0 K/UL (ref 0–0.04)
IMM GRANULOCYTES NFR BLD AUTO: 0 % (ref 0–0.5)
KETONES UR QL STRIP.AUTO: NEGATIVE MG/DL
LDLC SERPL CALC-MCNC: 115.8 MG/DL (ref 0–100)
LEUKOCYTE ESTERASE UR QL STRIP.AUTO: NEGATIVE
LYMPHOCYTES # BLD: 1.4 K/UL (ref 0.8–3.5)
LYMPHOCYTES NFR BLD: 32 % (ref 12–49)
MCH RBC QN AUTO: 28.1 PG (ref 26–34)
MCHC RBC AUTO-ENTMCNC: 30.6 G/DL (ref 30–36.5)
MCV RBC AUTO: 91.9 FL (ref 80–99)
MONOCYTES # BLD: 0.4 K/UL (ref 0–1)
MONOCYTES NFR BLD: 9 % (ref 5–13)
NEUTS SEG # BLD: 2.5 K/UL (ref 1.8–8)
NEUTS SEG NFR BLD: 54 % (ref 32–75)
NITRITE UR QL STRIP.AUTO: NEGATIVE
NRBC # BLD: 0 K/UL (ref 0–0.01)
NRBC BLD-RTO: 0 PER 100 WBC
PH UR STRIP: 6.5 [PH] (ref 5–8)
PLATELET # BLD AUTO: 231 K/UL (ref 150–400)
PMV BLD AUTO: 11.8 FL (ref 8.9–12.9)
POTASSIUM SERPL-SCNC: 4.1 MMOL/L (ref 3.5–5.1)
PROT SERPL-MCNC: 8.1 G/DL (ref 6.4–8.2)
PROT UR STRIP-MCNC: NEGATIVE MG/DL
RBC # BLD AUTO: 4.34 M/UL (ref 3.8–5.2)
SODIUM SERPL-SCNC: 139 MMOL/L (ref 136–145)
SP GR UR REFRACTOMETRY: 1.01 (ref 1–1.03)
T4 FREE SERPL-MCNC: 1.1 NG/DL (ref 0.8–1.5)
TRIGL SERPL-MCNC: 66 MG/DL (ref ?–150)
TSH SERPL DL<=0.05 MIU/L-ACNC: 2.76 UIU/ML (ref 0.36–3.74)
UROBILINOGEN UR QL STRIP.AUTO: 0.2 EU/DL (ref 0.2–1)
VLDLC SERPL CALC-MCNC: 13.2 MG/DL
WBC # BLD AUTO: 4.5 K/UL (ref 3.6–11)

## 2021-12-08 PROCEDURE — 90732 PPSV23 VACC 2 YRS+ SUBQ/IM: CPT | Performed by: NURSE PRACTITIONER

## 2021-12-08 PROCEDURE — G0009 ADMIN PNEUMOCOCCAL VACCINE: HCPCS | Performed by: NURSE PRACTITIONER

## 2022-02-28 RX ORDER — RABEPRAZOLE SODIUM 20 MG/1
TABLET, DELAYED RELEASE ORAL
Qty: 60 TABLET | Refills: 0 | Status: SHIPPED | OUTPATIENT
Start: 2022-02-28 | End: 2022-03-04

## 2022-02-28 NOTE — TELEPHONE ENCOUNTER
----- Message from Tramaine Brush sent at 2/26/2022  1:09 PM EST -----  Subject: Medication Problem    QUESTIONS  Name of Medication? RABEprazole (ACIPHEX) 20 mg TbEC  Patient-reported dosage and instructions? 20mg take 1 tablet a day   What question or problem do you have with the medication? Patient was   calling in to get prior authorization on her medication . The pharmacy is   requesting it   Preferred Pharmacy? [id number] - [place], [address] [place] [address]  [address] [place] [address],[address] phone number (if available)? [phone]  Additional Information for Provider?   ---------------------------------------------------------------------------  --------------  [address]  What is the best way for the office to contact you? OK to leave message on   voicemail  Preferred Call Back Phone Number? [phone]  ---------------------------------------------------------------------------  --------------  SCRIPT ANSWERS  Relationship to Patient?  Self

## 2022-02-28 NOTE — TELEPHONE ENCOUNTER
Last fill: 08/30/2021 (90 day supply with 3 additional refills)    Last ov: 12/07/2021    Has upcoming appt scheduled for 04/01/2022 with Dr. Kleber Moreau

## 2022-03-04 RX ORDER — RABEPRAZOLE SODIUM 20 MG/1
TABLET, DELAYED RELEASE ORAL
Qty: 60 TABLET | Refills: 0 | Status: SHIPPED | OUTPATIENT
Start: 2022-03-04 | End: 2022-06-02 | Stop reason: SDUPTHER

## 2022-03-04 NOTE — TELEPHONE ENCOUNTER
I have not previously evaluated this patient; her most recent visit was in December 2021. A courtesy 60-day refill is provided for the patient; please have her schedule an appointment with me for establishment.

## 2022-03-14 NOTE — PROGRESS NOTES
Subjective:     Chief Complaint   Patient presents with   [address] is a 75 y.o. F.  This 60-year-old female patient was recently seen in December 2021 for a routine wellness examination. She was felt to be doing generally okay at the time, although she was still struggling with her weight. She was still noted to be over 410 pounds, and was having difficulty getting around. She had flatly declined previous offers of bariatric surgery. She had been referred for routine laboratory studies. She was otherwise continued on her usual medication regimen. Today, the patient comes in for establishment and routine follow-up. In general, she reports feeling generally well. She denies any acute somatic complaints. She does however, mention that she has had a lump on her left thigh that she would like to be examined for. This has been present for many months, and has not been significantly changing. She references a 2 to 3 inch in diameter mobile lump on her left thigh, that is nontender to the touch. This is well-circumscribed and not fluctuant, feeling relatively solid, consistent with a lipoma. She denies any fevers or chills or any other constitutional or systemic complaints. Her weight overall has been stable, today weighing at 407 pounds. As per previous visits, she is not interested in bariatric surgery at this time, but is now willing to consider pharmacotherapy. We spent about 20 minutes today discussing the risks and benefits of potential pharmacotherapy options for her to include semaglutide. She denies any personal history of medullary thyroid carcinoma, pheochromocytoma, or any family history of multiple endocrine neoplasia. She does not have a history of diabetes. She also denies any history of daytime fatigue, snoring, or witnessed apneic episodes; she has not previously had a sleep study.   Her heartburn continues to be well controlled with rabeprazole; she is aware of the need to try to lose weight to get this under better control. Her blood pressure readings at home are similar to where they are in clinic today while on Zestoretic and she denies any lightheadedness or dizziness with this medication. She denies any muscle aches with simvastatin but requests a refill of this; she is amenable to a change to Lipitor. Her review of systems is otherwise unremarkable. Past Medical History:  Past Medical History:   Diagnosis Date    Arthritis     Chronic pain     DJD (degenerative joint disease) of knee 8/23/2017    Gallstones 8/23/2017    Gastroesophageal reflux disease with hiatal hernia 8/23/2017    Hip pain, right 8/23/2017    History of tinea corporis 08/23/2017    Hyperlipidemia 08/23/2017    RX = simvastatin    Hypertension     RX = Lisinopril/HCTZ    Hypokalemia 8/23/2017    Left thyroid nodule 8/23/2017    Lower extremity edema 08/23/2017    Lumbar disc disease 8/23/2017    Menopause 8/23/2017    Morbid obesity ([address] 8/23/2017    Overactive bladder     Rhinitis, allergic 8/23/2017    Urge incontinence 08/23/2017    RX = Myrbetrqi    Vitamin D deficiency 8/23/2017       Past Surgical Histor:  Past Surgical History:   Procedure Laterality Date    COLONOSCOPY N/A 2/9/2018    COLONOSCOPY performed by Santana Covarrubias MD at [facility]  11/12/2014         HX CATARACT REMOVAL Right     HX HEENT      NODULES-VOCAL CORD    HX ORTHOPAEDIC  2004    ELIGIO KNEE REPLACEMENT    HX TONSILLECTOMY  AS CHILD    HX TUBAL LIGATION      SD ABDOMEN SURGERY PROC UNLISTED      HERNIA       Allergies: Allergies   Allergen Reactions    Adhesive Tape-Silicones Unknown (comments)       Medications:  Current Outpatient Medications   Medication Sig Dispense Refill    tolterodine ER (Detrol LA) 4 mg ER capsule       atorvastatin (LIPITOR) 40 mg tablet Take 1 Tablet by mouth daily for 360 days.  30 Tablet 11    semaglutide (OZEMPIC) 0.25 mg or 0.5 mg/dose (2 mg/1.5 ml) subq pen 0.25 mg by SubCUTAneous route every seven (7) days for 56 days. 1 Box 0    [START ON 5/27/2022] semaglutide (Ozempic) 0.25 mg or 0.5 mg/dose (2 mg/1.5 ml) subq pen 0.5 mg by SubCUTAneous route every seven (7) days for 56 days. 2 Pen 0    RABEprazole (ACIPHEX) 20 mg TbEC TAKE 1 TABLET BY MOUTH EVERY DAY  Indications: gastroesophageal reflux disease, heartburn 60 Tablet 0    lisinopril-hydroCHLOROthiazide (PRINZIDE, ZESTORETIC) 10-12.5 mg per tablet TAKE 1 TABLET DAILY 90 Tablet 3    potassium chloride SA (MICRO-K) 10 mEq capsule TAKE 2 CAPSULES DAILY 180 Cap 3    biotin (VITAMIN B7) 5 mg capsule Take 10 mg by mouth.  clotrimazole-betamethasone (LOTRISONE) topical cream Apply  to affected area two (2) times a day.  mirabegron ER (MYRBETRIQ) 25 mg ER tablet Take 25 mg by mouth daily.  ERGOCALCIFEROL, VITAMIN D2, (VITAMIN D2 PO) Take  by mouth.  omega-3 fatty acids-vitamin e (FISH OIL) 1,000 mg cap Take 1 Cap by mouth daily.  azithromycin (ZITHROMAX) 250 mg tablet Take 2 tablets initially then one tablet daily until completed (Patient not taking: Reported on 12/7/2021) 6 Tab 0    diclofenac (VOLTAREN) 1 % gel Apply  to affected area four (4) times daily.  (Patient not taking: Reported on 12/7/2021)         Social History:  Social History     Socioeconomic History    Marital status:    Occupational History    Occupation: retired   Tobacco Use    Smoking status: Never Smoker    Smokeless tobacco: Never Used   Vaping Use    Vaping Use: Never used   Substance and Sexual Activity    Alcohol use: No    Drug use: No    Sexual activity: Not Currently       Family History:  Family History   Problem Relation Age of Onset    Diabetes Mother     Cancer Father     Hypertension Sister     Hypertension Brother     Heart Disease Brother        Immunizations:  Immunization History   Administered Date(s) Administered    COVIDFreeCharge top, DILUTE for use, 12+ yrs, 30mcg/0.3mL dose 03/09/2021, 03/30/2021    Influenza Vaccine 11/14/2016    Influenza Vaccine (Quad) PF (>6 Mo Flulaval, Fluarix, and >3 Yrs Afluria, Fluzone 82161) 12/28/2017, 10/22/2018    Influenza Vaccine Split 10/31/2011    Influenza, Quadrivalent, Adjuvanted (>65 Yrs FLUAD QUAD 40711) 11/24/2020, 12/07/2021    Pneumococcal Conjugate (PCV-13) 11/24/2020    Pneumococcal Polysaccharide (PPSV-23) 12/08/2021        Healthcare Maintenance:  Health Maintenance   Topic Date Due    DTaP/Tdap/Td series (1 - Tdap) Never done    Shingrix Vaccine Age 50> (1 of 2) Never done    Bone Densitometry (Dexa) Screening  Never done    COVID-19 Vaccine (3 - Booster for Pfizer series) 08/30/2021    Depression Screen  12/07/2022    Lipid Screen  12/07/2022    Medicare Yearly Exam  12/08/2022    Breast Cancer Screen Mammogram  01/13/2024    Colorectal Cancer Screening Combo  02/09/2028    Hepatitis C Screening  Completed    Flu Vaccine  Completed    Pneumococcal 65+ years  Completed        Review of Systems:  ROS:  Review of Systems   Constitutional: Negative. HENT: Negative. Eyes: Negative. Respiratory: Negative. Cardiovascular: Negative. Gastrointestinal: Negative. Genitourinary: Negative. Musculoskeletal: Negative. Skin: Negative. Neurological: Negative. Endo/Heme/Allergies: Negative. Psychiatric/Behavioral: Negative. ROS otherwise negative      Objective:     Vital Signs:  Visit Vitals  /84 (BP 1 Location: Right arm, BP Patient Position: Sitting, BP Cuff Size: Large adult)   Pulse 83   Temp 98.4 °F (36.9 °C) (Oral)   Resp 18   Ht 5' 5\" (1.651 m)   Wt (!) 407 lb 9.6 oz (184.9 kg)   SpO2 99%   BMI 67.83 kg/m²       BMI:  Body mass index is 67.83 kg/m². Physical Examination:  Physical Exam  Constitutional:       Appearance: Normal appearance. She is obese. HENT:      Head: Normocephalic and atraumatic.       Right Ear: External ear normal. Left Ear: External ear normal.      Nose: Nose normal.      Mouth/Throat:      Mouth: Mucous membranes are moist.      Pharynx: Oropharynx is clear. No oropharyngeal exudate or posterior oropharyngeal erythema. Cardiovascular:      Rate and Rhythm: Normal rate and regular rhythm. Pulses: Normal pulses. Heart sounds: Normal heart sounds. No murmur heard. No friction rub. No gallop. Pulmonary:      Effort: Pulmonary effort is normal. No respiratory distress. Breath sounds: Normal breath sounds. No wheezing, rhonchi or rales. Abdominal:      General: Abdomen is flat. Bowel sounds are normal. There is no distension. Palpations: Abdomen is soft. Tenderness: There is no abdominal tenderness. There is no guarding. Musculoskeletal:         General: No swelling, tenderness or deformity. Normal range of motion. Cervical back: Normal range of motion and neck supple. No rigidity or tenderness. Right lower leg: Edema present. Left lower leg: Edema present. Comments: 2-3\" diameter well circumscribed mass on left thigh, mobile, not fluctuant, solid, c/w lipoma   Skin:     General: Skin is warm and dry. Findings: No erythema, lesion or rash. Neurological:      General: No focal deficit present. Mental Status: She is alert and oriented to person, place, and time. Mental status is at baseline. Sensory: No sensory deficit. Motor: No weakness.       Gait: Gait normal.      Deep Tendon Reflexes: Reflexes normal.   Psychiatric:         Mood and Affect: Mood normal.         Behavior: Behavior normal.         Judgment: Judgment normal.          Physical exam otherwise negative    Diagnostic Testing:    Laboratory Studies:  Office Visit on 12/07/2021   Component Date Value Ref Range Status    T4, Free 12/07/2021 1.1  0.8 - 1.5 NG/DL Final    WBC 12/07/2021 4.5  3.6 - 11.0 K/uL Final    RBC 12/07/2021 4.34  3.80 - 5.20 M/uL Final    HGB 12/07/2021 12.2  11.5 - 16.0 g/dL Final    HCT 12/07/2021 39.9  35.0 - 47.0 % Final    MCV 12/07/2021 91.9  80.0 - 99.0 FL Final    MCH 12/07/2021 28.1  26.0 - 34.0 PG Final    MCHC 12/07/2021 30.6  30.0 - 36.5 g/dL Final    RDW 12/07/2021 14.3  11.5 - 14.5 % Final    PLATELET 15/52/8012 891  150 - 400 K/uL Final    MPV 12/07/2021 11.8  8.9 - 12.9 FL Final    NRBC 12/07/2021 0.0  0  WBC Final    ABSOLUTE NRBC 12/07/2021 0.00  0.00 - 0.01 K/uL Final    NEUTROPHILS 12/07/2021 54  32 - 75 % Final    LYMPHOCYTES 12/07/2021 32  12 - 49 % Final    MONOCYTES 12/07/2021 9  5 - 13 % Final    EOSINOPHILS 12/07/2021 4  0 - 7 % Final    BASOPHILS 12/07/2021 1  0 - 1 % Final    IMMATURE GRANULOCYTES 12/07/2021 0  0.0 - 0.5 % Final    ABS. NEUTROPHILS 12/07/2021 2.5  1.8 - 8.0 K/UL Final    ABS. LYMPHOCYTES 12/07/2021 1.4  0.8 - 3.5 K/UL Final    ABS. MONOCYTES 12/07/2021 0.4  0.0 - 1.0 K/UL Final    ABS. EOSINOPHILS 12/07/2021 0.2  0.0 - 0.4 K/UL Final    ABS. BASOPHILS 12/07/2021 0.0  0.0 - 0.1 K/UL Final    ABS. IMM. GRANS. 12/07/2021 0.0  0.00 - 0.04 K/UL Final    DF 12/07/2021 AUTOMATED    Final    Hemoglobin A1c 12/07/2021 5.2  4.0 - 5.6 % Final    Comment: NEW METHOD PLEASE NOTE NEW REFERENCE RANGE  (NOTE)  HbA1C Interpretive Ranges  <5.7              Normal  5.7 - 6.4         Consider Prediabetes  >6.5              Consider Diabetes      Est. average glucose 12/07/2021 103  mg/dL Final    Cholesterol, total 12/07/2021 182  <200 MG/DL Final    Triglyceride 12/07/2021 66  <150 MG/DL Final    Comment: Based on NCEP-ATP III:  Triglycerides <150 mg/dL  is considered normal, 150-199  mg/dL  borderline high,  200-499 mg/dL high and  greater than or equal to 500  mg/dL very high.  HDL Cholesterol 12/07/2021 53  MG/DL Final    Comment: Based on NCEP ATP III, HDL Cholesterol <40 mg/dL is considered low and >60  mg/dL is elevated.       LDL, calculated 12/07/2021 115.8* 0 - 100 MG/DL Final    Comment: Based on the NCEP-ATP: LDL-C concentrations are considered  optimal <100 mg/dL,  near optimal/above Normal 100-129 mg/dL Borderline High: 130-159, High: 160-189  mg/dL Very High: Greater than or equal to 190 mg/dL      VLDL, calculated 12/07/2021 13.2  MG/DL Final    CHOL/HDL Ratio 12/07/2021 3.4  0.0 - 5.0   Final    Sodium 12/07/2021 139  136 - 145 mmol/L Final    Potassium 12/07/2021 4.1  3.5 - 5.1 mmol/L Final    Chloride 12/07/2021 107  97 - 108 mmol/L Final    CO2 12/07/2021 28  21 - 32 mmol/L Final    Anion gap 12/07/2021 4* 5 - 15 mmol/L Final    Glucose 12/07/2021 92  65 - 100 mg/dL Final    BUN 12/07/2021 16  6 - 20 MG/DL Final    Creatinine 12/07/2021 0.70  0.55 - 1.02 MG/DL Final    BUN/Creatinine ratio 12/07/2021 23* 12 - 20   Final    GFR est AA 12/07/2021 >60  >60 ml/min/1.73m2 Final    GFR est non-AA 12/07/2021 >60  >60 ml/min/1.73m2 Final    Comment: Estimated GFR is calculated using the IDMS-traceable Modification of Diet in  Renal Disease (MDRD) Study equation, reported for both  Americans  (GFRAA) and non- Americans (GFRNA), and normalized to 1.73m2 body  surface area. The physician must decide which value applies to the patient.  Calcium 12/07/2021 9.7  8.5 - 10.1 MG/DL Final    Bilirubin, total 12/07/2021 0.8  0.2 - 1.0 MG/DL Final    ALT (SGPT) 12/07/2021 16  12 - 78 U/L Final    AST (SGOT) 12/07/2021 10* 15 - 37 U/L Final    Alk.  phosphatase 12/07/2021 123* 45 - 117 U/L Final    Protein, total 12/07/2021 8.1  6.4 - 8.2 g/dL Final    Albumin 12/07/2021 3.9  3.5 - 5.0 g/dL Final    Globulin 12/07/2021 4.2* 2.0 - 4.0 g/dL Final    A-G Ratio 12/07/2021 0.9* 1.1 - 2.2   Final    Vitamin D 25-Hydroxy 12/07/2021 28.2* 30 - 100 ng/mL Final    Comment: (NOTE)  Deficiency               <20 ng/mL  Insufficiency          20-30 ng/mL  Sufficient             ng/mL  Possible toxicity       >100 ng/mL    The Method used is Siemens Advia Centaur currently standardized to a Center of Disease Control and Prevention (CDC) certified reference   method. Samples containing fluorescein dye can produce falsely   elevated values when tested with the ADVIA Centaur Vitamin D Assay. It is recommended that results in the toxic range, >100 ng/mL, be   retested 72 hours post fluorescein exposure.  Color 12/07/2021 YELLOW/STRAW    Final    Color Reference Range: Straw, Yellow or Dark Yellow    Appearance 12/07/2021 CLEAR  CLEAR   Final    Specific gravity 12/07/2021 1.013  1.003 - 1.030   Final    pH (UA) 12/07/2021 6.5  5.0 - 8.0   Final    Protein 12/07/2021 Negative  Negative mg/dL Final    Glucose 12/07/2021 Negative  Negative mg/dL Final    Ketone 12/07/2021 Negative  Negative mg/dL Final    Bilirubin 12/07/2021 Negative  Negative   Final    Blood 12/07/2021 Negative  Negative   Final    Urobilinogen 12/07/2021 0.2  0.2 - 1.0 EU/dL Final    Nitrites 12/07/2021 Negative  Negative   Final    Leukocyte Esterase 12/07/2021 Negative  Negative   Final    TSH 12/07/2021 2.76  0.36 - 3.74 uIU/mL Final    Comment:   Due to TSH heterogeneity, both structurally and degree of glycosylation,  monoclonal antibodies used in the TSH assay may not accurately quantitate TSH. Therefore, this result should be correlated with clinical findings as well as  with other assessments of thyroid function, e.g., free T4, free T3.      SAMPLES BEING HELD 12/07/2021 1UA CUP   Final    COMMENT 12/07/2021 Add-on orders for these samples will be processed based on acceptable specimen integrity and analyte stability, which may vary by analyte. Final         Radiographic Studies:  XR Results (most recent):  Results from Hospital Encounter encounter on 12/06/11    XR KUB    Narrative  **Final Report**      ICD Codes / Adm. Diagnosis: 931251  564.00 / Constipation  Examination:  CR ABDOMEN SINGLE VW AP  - [id number] - Dec  6 2011 10:46AM  Accession No:  [id number]  Reason:  Abdominal Pain      REPORT:  An AP radiograph of the abdomen was performed. There is elevation of the  right hemidiaphragm. There is moderate stool in the colon. The bowel gas  pattern is unremarkable. There are no abnormal calcifications. There are  degenerative changes of the spine. IMPRESSION: No acute findings. Signing/Reading Doctor: Florecita Mendosa ([id number])  Approved: Florecita Mendosa ([id number])  12/06/2011     [facility] Results (most recent):  Results from [place] encounter on 05/22/13    [facility] MAMMO DX DIGTL RT    Narrative  **Final Report**      ICD Codes / Adm. Diagnosis: 796.4  564.00 / Other abnormal clinical findin  Examination:  MM MAMMO DIG DX UNI RT  - [id number] - May 22 2013 10:41AM  Accession No:  [id number]  Reason:  abn mammo      REPORT:  RIGHT DIAGNOSTIC MAMMOGRAM.  INDICATION:   Callback from screening examination. COMPARISON: Mammogram(s), most recent, 5/18/2013. Barbara Maid COMPOSITION: There are scattered fibroglandular densities. .  TECHNIQUE: Whole breast CC and MLO views were performed of the right breast  using digital technique and subjected to computer-aided detection. Barbara Maid FINDINGS:  There is a group of rounded calcifications with a few irregular  calcifications in the retroareolar right breast, junction of the middle and  anterior thirds which are new since the comparison. Additionally, there has  been a slight increase in the number of calcifications in the medial aspect  of the right breast, middle 3rd  No new visualized mass or architectural distortion. .    IMPRESSION:  1. New group of calcifications in the retroareolar right breast, junction of  the anterior and middle thirds. BI-RADS Category 4 - Suspicious abnormality. 2. Increasing calcifications in the medial aspect of the right breast,  middle 3rd. BI-RADS Category 4 - Suspicious abnormality. .  RECOMMENDATION: Stereotactic core needle biopsy of the 2 groups of  calcifications.  The findings of this examination the recommendation for  biopsy were directly discussed with the patient at time of exam by myself          Signing/Reading Doctor: Selwyn Payan ([id number])  Approved: Selwyn Payan ([id number])  May 22 2013 11:32AM     CT Results (most recent):  Results from Hospital Encounter encounter on 12/28/17    CT ABD PELV W CONT    Narrative  EXAM:  CT ABD PELV W CONT    INDICATION: right lower quadrant pain. Symptoms present for several years,  increasing in severity. COMPARISON: 5/11/2015    CONTRAST:  100 mL of Isovue-370. TECHNIQUE:  Following the uneventful intravenous administration of contrast, thin axial  images were obtained through the abdomen and pelvis. Coronal and sagittal  reconstructions were generated. Oral contrast was not administered. CT dose  reduction was achieved through use of a standardized protocol tailored for this  examination and automatic exposure control for dose modulation. FINDINGS:  LUNG BASES: Clear. INCIDENTALLY IMAGED HEART AND MEDIASTINUM: Coronary artery calcification. Small  hiatal hernia. LIVER: No mass or biliary dilatation. GALLBLADDER: Unremarkable. SPLEEN: No mass. PANCREAS: No mass or ductal dilatation. ADRENALS: Unremarkable. KIDNEYS: No mass, calculus, or hydronephrosis. STOMACH: Unremarkable. SMALL BOWEL: No dilatation or wall thickening. COLON: No dilatation or wall thickening. APPENDIX: Not visualized. Morbid obesity. Jasvir Settler PERITONEUM: No ascites or pneumoperitoneum. RETROPERITONEUM: No lymphadenopathy or aortic aneurysm. REPRODUCTIVE ORGANS: Small uterus  URINARY BLADDER: No mass or calculus. BONES: No destructive bone lesion. Mild diastases of the symphysis. Vacuum  phenomena of both sacroiliac joints. Vacuum phenomena at all lumbar levels. ADDITIONAL COMMENTS: N/A    Impression  IMPRESSION:  No acute process identified as best assessed in a patient of this body habitus. Morbid obesity.   Marked lumbar spondylosis     DEXA Results (most recent):  No results found for this or any previous visit.     MRI Results (most recent):  Results from [facility] encounter on 12/03/16    MRI FEMUR LT W WO CONT    Narrative  EXAM:  MRI FEMUR LT W WO CONT    INDICATION:  Nodule anterior left thigh    COMPARISON: None    TECHNIQUE: Axial, coronal, and sagittal MRI of the left in the T1, T2, and  inversion-recovery pulse sequences with and without fat saturation . Axial T1  with fat saturation    CONTRAST: After the intravenous demonstration of 15 cc of Gadavist sagittal and  axial fat-suppressed T1-weighted images were obtained    FINDINGS: Bone marrow: within normal limits. No fracture, dislocation, or marrow  replacing process. No evidence of stress reaction or periostitis. Tendons: Intact. Muscles: Within normal limits. Neurovascular bundles: Within normal limits. Articular cartilage:intact. No osteochondral lesion. Soft tissue mass: In the subcutaneous tissues anterior mid left thigh there is a  4.2 x 2.1 x 3.2 cm well-circumscribed mass. It is hypointense on T1-weighted  images and hyperintense on T2-weighted images. It has a lobular but well  circumscribed margin. On the fat-suppressed images it demonstrates no  suppression and is slightly hyperintense to adjacent muscle which may indicate  proteinaceous material. Postcontrast images demonstrate thin linear enhancement  of the margins. There is no central or nodular enhancement. A single thin  septation is noted. Impression  IMPRESSION:  1. Mass lesion anterior thigh has imaging characteristics compatible with a  cyst. It may contain proteinaceous material       Assessment/Plan:       ICD-10-CM ICD-9-CM    1. Routine adult health maintenance  Z00.00 V70.0    2. Morbid obesity (HCC)  E66.01 278.01 semaglutide (OZEMPIC) 0.25 mg or 0.5 mg/dose (2 mg/1.5 ml) subq pen      semaglutide (Ozempic) 0.25 mg or 0.5 mg/dose (2 mg/1.5 ml) subq pen   3. Primary hypertension  I10 401.9    4.  Mixed hyperlipidemia  E78.2 272.2 atorvastatin (LIPITOR) 40 mg tablet   5. Mixed stress and urge urinary incontinence  N39.46 788.33    6. Postmenopausal state  Z78.0 V49.81 DEXA BONE DENSITY STUDY AXIAL   7. Encounter for immunization  Z23 V03.89 TETANUS, DIPHTHERIA TOXOIDS AND ACELLULAR PERTUSSIS VACCINE (TDAP), IN INDIVIDS. >=7, IM   8. Vitamin D deficiency  O08.2 566.0 METABOLIC PANEL, COMPREHENSIVE      VITAMIN D, 25 HYDROXY   9. Gastroesophageal reflux disease with hiatal hernia  K21.9 530.81     K44.9 553.3    10. Lipoma of left thigh  D17.24 214.8 US EXT NONVAS LT LTD          This 79-year-old -American female has severe morbid obesity. She would clearly benefit from bariatric surgery or pharmacotherapy to help with this. After prolonged discussion today, the patient is amenable to considering pharmacotherapy, and semaglutide/Ozempic is ordered for her, although she says that she will need to talk about this with her  before initiation. Her blood pressure appears well controlled today and she will continue with her current regimen. She is tolerating her Zocor, but given the elevated dose of this medication, switching to Lipitor instead. She likely has a lipoma on her left thigh, and an ultrasound is ordered for further characterization; she denies any symptoms associated with this, so we will probably defer general surgery referral.      Her GERD appears well controlled, although over time I would like for her to be able to discontinue AcipHex given the risks associated with prolonged use of this medication. Referral is also made for routine laboratory studies, and vitamin D levels, given the need to screen for osteopenia/osteoporosis with DEXA scanning noted above. Follow-up with me in 3 months time. Follow-up and Dispositions    · Return in about 3 months (around 7/1/2022). Xiao Magallon MD    Please note that this dictation was completed with [employer], the computer voice recognition software.   Quite often unanticipated grammatical, syntax, homophones, and other interpretive errors are inadvertently transcribed by the computer software. Please disregard these errors. Please excuse any errors that have escaped final proofreading.

## 2022-03-18 PROBLEM — R51.9 HEADACHE: Status: ACTIVE | Noted: 2017-08-23

## 2022-03-18 PROBLEM — K21.9 GASTROESOPHAGEAL REFLUX DISEASE WITH HIATAL HERNIA: Status: ACTIVE | Noted: 2017-08-23

## 2022-03-18 PROBLEM — K44.9 GASTROESOPHAGEAL REFLUX DISEASE WITH HIATAL HERNIA: Status: ACTIVE | Noted: 2017-08-23

## 2022-03-18 PROBLEM — J30.9 RHINITIS, ALLERGIC: Status: ACTIVE | Noted: 2017-08-23

## 2022-03-18 PROBLEM — Z78.0 MENOPAUSE: Status: ACTIVE | Noted: 2017-08-23

## 2022-03-18 PROBLEM — R05.9 COUGH: Status: ACTIVE | Noted: 2017-08-23

## 2022-03-18 PROBLEM — M79.89 LEG SWELLING: Status: ACTIVE | Noted: 2017-08-23

## 2022-03-18 PROBLEM — R32 URINARY INCONTINENCE: Status: ACTIVE | Noted: 2017-08-23

## 2022-03-19 PROBLEM — R10.30 LOWER ABDOMINAL PAIN: Status: ACTIVE | Noted: 2017-08-23

## 2022-03-19 PROBLEM — M17.9 DJD (DEGENERATIVE JOINT DISEASE) OF KNEE: Status: ACTIVE | Noted: 2017-08-23

## 2022-03-19 PROBLEM — E87.6 HYPOKALEMIA: Status: ACTIVE | Noted: 2017-08-23

## 2022-03-19 PROBLEM — Z71.3 DIETARY COUNSELING: Status: ACTIVE | Noted: 2017-08-23

## 2022-03-19 PROBLEM — E66.9 OBESITY (BMI 30-39.9): Status: ACTIVE | Noted: 2017-08-23

## 2022-03-19 PROBLEM — E78.5 HYPERLIPIDEMIA: Status: ACTIVE | Noted: 2017-08-23

## 2022-03-19 PROBLEM — B35.4 TINEA CORPORIS: Status: ACTIVE | Noted: 2017-08-23

## 2022-03-19 PROBLEM — I10 HTN (HYPERTENSION): Status: ACTIVE | Noted: 2017-08-23

## 2022-03-19 PROBLEM — M19.90 DEGENERATIVE JOINT DISEASE: Status: ACTIVE | Noted: 2017-08-23

## 2022-03-19 PROBLEM — M51.9 LUMBAR DISC DISEASE: Status: ACTIVE | Noted: 2017-08-23

## 2022-03-19 PROBLEM — K80.20 GALLSTONES: Status: ACTIVE | Noted: 2017-08-23

## 2022-03-19 PROBLEM — M79.89 SWELLING OF BOTH LOWER EXTREMITIES: Status: ACTIVE | Noted: 2017-08-23

## 2022-03-19 PROBLEM — R10.9 ABDOMINAL PAIN: Status: ACTIVE | Noted: 2017-08-23

## 2022-03-19 PROBLEM — E55.9 VITAMIN D DEFICIENCY: Status: ACTIVE | Noted: 2017-08-23

## 2022-03-19 PROBLEM — L25.0 CONTACT DERMATITIS DUE TO COSMETICS: Status: ACTIVE | Noted: 2017-08-23

## 2022-03-19 PROBLEM — M25.551 HIP PAIN, RIGHT: Status: ACTIVE | Noted: 2017-08-23

## 2022-03-19 PROBLEM — Z92.89 HISTORY OF TRANSFUSION: Status: ACTIVE | Noted: 2017-08-23

## 2022-03-19 PROBLEM — R21 RASH: Status: ACTIVE | Noted: 2017-08-23

## 2022-03-20 PROBLEM — E66.01 MORBID OBESITY (HCC): Status: ACTIVE | Noted: 2017-08-23

## 2022-03-20 PROBLEM — E04.1 LEFT THYROID NODULE: Status: ACTIVE | Noted: 2017-08-23

## 2022-04-01 ENCOUNTER — OFFICE VISIT (OUTPATIENT)
Dept: INTERNAL MEDICINE CLINIC | Age: 67
End: 2022-04-01
Payer: MEDICARE

## 2022-04-01 VITALS
HEART RATE: 83 BPM | RESPIRATION RATE: 18 BRPM | BODY MASS INDEX: 48.82 KG/M2 | SYSTOLIC BLOOD PRESSURE: 136 MMHG | OXYGEN SATURATION: 99 % | TEMPERATURE: 98.4 F | DIASTOLIC BLOOD PRESSURE: 84 MMHG | WEIGHT: 293 LBS | HEIGHT: 65 IN

## 2022-04-01 DIAGNOSIS — K21.9 GASTROESOPHAGEAL REFLUX DISEASE WITH HIATAL HERNIA: ICD-10-CM

## 2022-04-01 DIAGNOSIS — I10 PRIMARY HYPERTENSION: ICD-10-CM

## 2022-04-01 DIAGNOSIS — N39.46 MIXED STRESS AND URGE URINARY INCONTINENCE: ICD-10-CM

## 2022-04-01 DIAGNOSIS — K44.9 GASTROESOPHAGEAL REFLUX DISEASE WITH HIATAL HERNIA: ICD-10-CM

## 2022-04-01 DIAGNOSIS — Z00.00 ROUTINE ADULT HEALTH MAINTENANCE: Primary | ICD-10-CM

## 2022-04-01 DIAGNOSIS — E78.2 MIXED HYPERLIPIDEMIA: ICD-10-CM

## 2022-04-01 DIAGNOSIS — D17.24 LIPOMA OF LEFT THIGH: ICD-10-CM

## 2022-04-01 DIAGNOSIS — E66.01 MORBID OBESITY (HCC): ICD-10-CM

## 2022-04-01 DIAGNOSIS — Z23 ENCOUNTER FOR IMMUNIZATION: ICD-10-CM

## 2022-04-01 DIAGNOSIS — E55.9 VITAMIN D DEFICIENCY: ICD-10-CM

## 2022-04-01 DIAGNOSIS — Z78.0 POSTMENOPAUSAL STATE: ICD-10-CM

## 2022-04-01 LAB
25(OH)D3 SERPL-MCNC: 18.7 NG/ML (ref 30–100)
ALBUMIN SERPL-MCNC: 3.8 G/DL (ref 3.5–5)
ALBUMIN/GLOB SERPL: 1 {RATIO} (ref 1.1–2.2)
ALP SERPL-CCNC: 124 U/L (ref 45–117)
ALT SERPL-CCNC: 21 U/L (ref 12–78)
ANION GAP SERPL CALC-SCNC: 4 MMOL/L (ref 5–15)
AST SERPL-CCNC: 10 U/L (ref 15–37)
BILIRUB SERPL-MCNC: 0.5 MG/DL (ref 0.2–1)
BUN SERPL-MCNC: 16 MG/DL (ref 6–20)
BUN/CREAT SERPL: 23 (ref 12–20)
CALCIUM SERPL-MCNC: 9.6 MG/DL (ref 8.5–10.1)
CHLORIDE SERPL-SCNC: 105 MMOL/L (ref 97–108)
CO2 SERPL-SCNC: 30 MMOL/L (ref 21–32)
CREAT SERPL-MCNC: 0.71 MG/DL (ref 0.55–1.02)
GLOBULIN SER CALC-MCNC: 3.9 G/DL (ref 2–4)
GLUCOSE SERPL-MCNC: 86 MG/DL (ref 65–100)
POTASSIUM SERPL-SCNC: 4.1 MMOL/L (ref 3.5–5.1)
PROT SERPL-MCNC: 7.7 G/DL (ref 6.4–8.2)
SODIUM SERPL-SCNC: 139 MMOL/L (ref 136–145)

## 2022-04-01 PROCEDURE — 90471 IMMUNIZATION ADMIN: CPT | Performed by: INTERNAL MEDICINE

## 2022-04-01 PROCEDURE — 90715 TDAP VACCINE 7 YRS/> IM: CPT | Performed by: INTERNAL MEDICINE

## 2022-04-01 PROCEDURE — 99215 OFFICE O/P EST HI 40 MIN: CPT | Performed by: INTERNAL MEDICINE

## 2022-04-01 RX ORDER — SEMAGLUTIDE 1.34 MG/ML
0.5 INJECTION, SOLUTION SUBCUTANEOUS
Qty: 2 PEN | Refills: 0 | Status: SHIPPED | OUTPATIENT
Start: 2022-05-27 | End: 2022-07-22

## 2022-04-01 RX ORDER — ATORVASTATIN CALCIUM 40 MG/1
40 TABLET, FILM COATED ORAL DAILY
Qty: 30 TABLET | Refills: 11 | Status: SHIPPED | OUTPATIENT
Start: 2022-04-01 | End: 2022-06-02 | Stop reason: SDUPTHER

## 2022-04-01 RX ORDER — TOLTERODINE 4 MG/1
CAPSULE, EXTENDED RELEASE ORAL
COMMUNITY
Start: 2021-04-12

## 2022-04-01 NOTE — PROGRESS NOTES
After obtaining written consent and per orders of Dr. Alfredo Du, injection of Tdap given by Audra Bull CMA. Order and injection/medication verified by Radha Sotelo LPN. Patient tolerated procedure well. VIS was given to them. No reactions noted.

## 2022-04-01 NOTE — PROGRESS NOTES
Chief Complaint   Patient presents with    Establish Care     Visit Vitals  /84 (BP 1 Location: Right arm, BP Patient Position: Sitting, BP Cuff Size: Large adult)   Pulse 83   Temp 98.4 °F (36.9 °C) (Oral)   Resp 18   Ht 5' 5\" (1.651 m)   Wt (!) 407 lb 9.6 oz (184.9 kg)   SpO2 99%   BMI 67.83 kg/m²           1. Have you been to the ER, urgent care clinic since your last visit? Hospitalized since your last visit? No    2. Have you seen or consulted any other health care providers outside of the [address] [place] [address] since your last visit? Include any pap smears or colon screening.  No

## 2022-04-01 NOTE — PATIENT INSTRUCTIONS
Dual-Energy X-Ray Absorptiometry (DXA) Test: About This Test  What is it? Dual-energy X-ray absorptiometry (DXA) is a test that uses two different X-ray beams to check bone thickness (density) in your spine and hip. This information is used to estimate the strength of your bones. Why is this test done? Bone density testing is often done for:  · People who are at risk for osteoporosis, including:  ? Women who are age 72 and older. ? Older men. ? People who take some medications, such as corticosteroids. ? People who have certain medical conditions, such as hyperparathyroidism. · Younger women who have osteoporosis and are at high risk for broken bones. · People who have osteoporosis, to see how well treatment is working. How is the test done? · You will lie on your back on a padded table. You probably can leave your clothes on, but you will remove any metal buttons or dexter for the test.  · You may need to lie with your legs straight or with your lower legs resting on a platform built into the table. · The machine will scan your bones and measure the amount of radiation they absorb. During this test you are exposed to a very low dose of radiation. How long does the test take? The DXA scan, which scans the hip and lower spine, takes about 20 minutes. Other kinds of bone density scans may take 30 to 45 minutes. What happens after the test?  · You will probably be able to go home right away. · You can go back to your usual activities right away. Follow-up care is a key part of your treatment and safety. Be sure to make and go to all appointments, and call your doctor if you are having problems. It's also a good idea to keep a list of the medicines you take. Ask your doctor when you can expect to have your test results. Where can you learn more?   Go to http://www.gray.com/  Enter F177 in the search box to learn more about \"Dual-Energy X-Ray Absorptiometry (DXA) Test: About This Test.\"  Current as of: September 8, 2021               Content Version: 13.2  © 9918-6048 [employer]. Care instructions adapted under license by [employer] (which disclaims liability or warranty for this information). If you have questions about a medical condition or this instruction, always ask your healthcare professional. [address] any warranty or liability for your use of this information. Lipoma: Care Instructions  Your Care Instructions  A lipoma is a growth of fat just below the skin. It may feel soft and rubbery. Lipomas can occur anywhere on the body. But they are most common on the torso, neck, upper thighs, upper arms, and armpits. A lipoma does not turn into cancer. Lipomas usually are not treated, because most of them don't hurt or cause problems. But your doctor may remove a lipoma if it is painful, gets infected, or bothers you. Follow-up care is a key part of your treatment and safety. Be sure to make and go to all appointments, and call your doctor if you are having problems. It's also a good idea to know your test results and keep a list of the medicines you take. How can you care for yourself at home? · A lipoma usually needs no care at home unless your doctor made a cut (incision) to remove it. · If your doctor told you how to care for your incision, follow your doctor's instructions. If you did not get instructions, follow this general advice:  ? Wash around the incision with clean water 2 times a day. Don't use hydrogen peroxide or alcohol. These can slow healing. ? You may cover the incision with a thin layer of petroleum jelly, such as Vaseline, and a nonstick bandage. ? Apply more petroleum jelly and replace the bandage as needed. When should you call for help?    Call your doctor now or seek immediate medical care if:    · You have signs of infection, such as:  ? Increased pain, swelling, warmth, or redness. ? Red streaks leading from the lipoma. ? Pus draining from the lipoma. ? A fever. Watch closely for changes in your health, and be sure to contact your doctor if:    · The lipoma is growing or changing.     · You do not get better as expected. Where can you learn more? Go to http://www.gray.com/  Enter J054 in the search box to learn more about \"Lipoma: Care Instructions. \"  Current as of: November 15, 2021               Content Version: 13.2  © 0441-2664 [employer]. Care instructions adapted under license by [employer] (which disclaims liability or warranty for this information). If you have questions about a medical condition or this instruction, always ask your healthcare professional. [address] any warranty or liability for your use of this information. Learning About Weight-Loss (Bariatric) Surgery  What is weight-loss surgery? Bariatric surgery is surgery to help you lose weight. This type of surgery is only used for people who are very overweight and have not been able to lose weight with diet and exercise. This surgery makes the stomach smaller. Some types of surgery also change the connection between your stomach and intestines. Having weight-loss surgery is a big step. After surgery, you'll need to make new, lifelong changes in how you eat and drink. How is weight-loss surgery done? Bariatric surgery may be either \"open\" or \"laparoscopic. \" Open surgery is done through a large cut (incision) in the belly. Laparoscopic surgery is done through several small cuts. The doctor puts a lighted tube, or scope, and other surgical tools through small cuts in your belly. The doctor is able to see your organs with the scope. There are different types of bariatric surgery. Gastric sleeve surgery  The surgery is usually done through several small incisions in the belly.  The doctor removes more than half of your stomach. This leaves a thin sleeve, or tube, that is about the size of a banana. Because part of your stomach has been removed, this can't be reversed. Moo-en-Y gastric bypass surgery  Moo-en-Y (say \"lyn-en-why\") surgery changes the connection between the stomach and the intestines. The doctor separates a section of your stomach from the rest of your stomach. This makes a small pouch. The new pouch will hold the food you eat. The doctor connects the stomach pouch to the middle part of the small intestine. Gastric banding surgery  The surgery is usually done through several small incisions in the belly. The doctor wraps a band around the upper part of the stomach. This creates a small pouch. The small size of the pouch means that you will get full after you eat just a small amount of food. The doctor can inflate or deflate the band to adjust the size. This lets the doctor adjust how quickly food passes from the new pouch into the stomach. It does not change the connection between the stomach and the intestines. What can you expect after the surgery? You may stay in the hospital for one or more days after the surgery. How long you stay depends on the type of surgery you had. Most people need 2 to 4 weeks before they are ready to get back to their usual routine. Your doctor will give you specific instructions about what to eat after the surgery. You'll start with only small amounts of soft foods and liquids. Bit by bit, you will be able to eat more solid foods. Your doctor may advise you to work with a dietitian. This way you'll be sure to get enough protein, vitamins, and minerals while you are losing weight. Even with a healthy diet, you may need to take vitamin and mineral supplements. After surgery, you will not be able to eat very much at one time. You will get full quickly. Try not to eat too much at one time or eat foods that are high in fat or sugar.  If you do, you may vomit, get stomach pain, or have diarrhea. Weight loss  You probably will lose weight very quickly in the first few months after surgery. As time goes on, your weight loss will slow down. You will have regular doctor visits to check how you are doing. Emotions  It is common to have many emotions after this surgery. You may feel happy or excited as you begin to lose weight. But you may also feel overwhelmed or frustrated by the changes that you have to make in your diet, activity, and lifestyle. Talk with your doctor if you have concerns or questions. Think of bariatric surgery as a tool to help you lose weight. It isn't an instant fix. You will still need to eat a healthy diet and get regular exercise. This will help you reach your weight goal and avoid regaining the weight you lose. Follow-up care is a key part of your treatment and safety. Be sure to make and go to all appointments, and call your doctor if you are having problems. It's also a good idea to know your test results and keep a list of the medicines you take. Where can you learn more? Go to http://www.gray.com/  Enter G469 in the search box to learn more about \"Learning About Weight-Loss (Bariatric) Surgery. \"  Current as of: December 27, 2021               Content Version: 13.2  © 2006-2022 [employer]. Care instructions adapted under license by [employer] (which disclaims liability or warranty for this information). If you have questions about a medical condition or this instruction, always ask your healthcare professional. [place] [address] any warranty or liability for your use of this information.

## 2022-04-03 NOTE — PROGRESS NOTES
Please let the patient know that her vitamin D level was low. If she is not currently taking supplementation, she should begin this, taking 2086-0367 units daily. If she is, then we can consider increasing the dose (e.g., 50,000 units weekly).

## 2022-06-02 DIAGNOSIS — E78.2 MIXED HYPERLIPIDEMIA: ICD-10-CM

## 2022-06-02 RX ORDER — ATORVASTATIN CALCIUM 40 MG/1
40 TABLET, FILM COATED ORAL DAILY
Qty: 90 TABLET | Refills: 3 | Status: SHIPPED | OUTPATIENT
Start: 2022-06-02 | End: 2023-05-28

## 2022-06-02 RX ORDER — RABEPRAZOLE SODIUM 20 MG/1
20 TABLET, DELAYED RELEASE ORAL DAILY
Qty: 90 TABLET | Refills: 3 | Status: SHIPPED | OUTPATIENT
Start: 2022-06-02 | End: 2023-05-28

## 2022-06-03 ENCOUNTER — TRANSCRIBE ORDER (OUTPATIENT)
Dept: SCHEDULING | Age: 67
End: 2022-06-03

## 2022-06-03 DIAGNOSIS — R22.42 MASS OF THIGH, LEFT: Primary | ICD-10-CM

## 2022-06-07 ENCOUNTER — HOSPITAL ENCOUNTER (OUTPATIENT)
Dept: ULTRASOUND IMAGING | Age: 67
Discharge: HOME | End: 2022-06-07
Attending: INTERNAL MEDICINE
Payer: MEDICARE

## 2022-06-07 DIAGNOSIS — R22.42 MASS OF THIGH, LEFT: ICD-10-CM

## 2022-06-07 PROCEDURE — 76882 US LMTD JT/FCL EVL NVASC XTR: CPT

## 2022-06-14 ENCOUNTER — OFFICE VISIT (OUTPATIENT)
Dept: SURGERY | Age: 67
End: 2022-06-14
Payer: MEDICARE

## 2022-06-14 VITALS
SYSTOLIC BLOOD PRESSURE: 153 MMHG | RESPIRATION RATE: 18 BRPM | BODY MASS INDEX: 48.82 KG/M2 | OXYGEN SATURATION: 100 % | WEIGHT: 293 LBS | DIASTOLIC BLOOD PRESSURE: 87 MMHG | HEART RATE: 90 BPM | HEIGHT: 65 IN | TEMPERATURE: 97.1 F

## 2022-06-14 DIAGNOSIS — L72.9 CYST OF SKIN: Primary | ICD-10-CM

## 2022-06-14 PROBLEM — E11.9 TYPE 2 DIABETES MELLITUS (HCC): Status: ACTIVE | Noted: 2022-06-14

## 2022-06-14 PROCEDURE — G8536 NO DOC ELDER MAL SCRN: HCPCS | Performed by: SURGERY

## 2022-06-14 PROCEDURE — 99213 OFFICE O/P EST LOW 20 MIN: CPT | Performed by: SURGERY

## 2022-06-14 PROCEDURE — 1123F ACP DISCUSS/DSCN MKR DOCD: CPT | Performed by: SURGERY

## 2022-06-14 PROCEDURE — G8754 DIAS BP LESS 90: HCPCS | Performed by: SURGERY

## 2022-06-14 PROCEDURE — G8400 PT W/DXA NO RESULTS DOC: HCPCS | Performed by: SURGERY

## 2022-06-14 PROCEDURE — 1101F PT FALLS ASSESS-DOCD LE1/YR: CPT | Performed by: SURGERY

## 2022-06-14 PROCEDURE — G9899 SCRN MAM PERF RSLTS DOC: HCPCS | Performed by: SURGERY

## 2022-06-14 PROCEDURE — G8417 CALC BMI ABV UP PARAM F/U: HCPCS | Performed by: SURGERY

## 2022-06-14 PROCEDURE — G8753 SYS BP > OR = 140: HCPCS | Performed by: SURGERY

## 2022-06-14 PROCEDURE — 1090F PRES/ABSN URINE INCON ASSESS: CPT | Performed by: SURGERY

## 2022-06-14 PROCEDURE — G8428 CUR MEDS NOT DOCUMENT: HCPCS | Performed by: SURGERY

## 2022-06-14 PROCEDURE — G8510 SCR DEP NEG, NO PLAN REQD: HCPCS | Performed by: SURGERY

## 2022-06-14 PROCEDURE — 3017F COLORECTAL CA SCREEN DOC REV: CPT | Performed by: SURGERY

## 2022-06-14 NOTE — PROGRESS NOTES
Surgery History and Physical    Subjective:      Jamie Hubbard is a 75 y.o. female who presents for evaluation of knot on her left leg. She has had it for over 1 month. It goes and comes back. Its not hurting or causing pain. US shows: Subcutaneous septated cyst.    Past Medical History:   Diagnosis Date    Arthritis     Chronic pain     DJD (degenerative joint disease) of knee 8/23/2017    Gallstones 8/23/2017    Gastroesophageal reflux disease with hiatal hernia 8/23/2017    Hip pain, right 8/23/2017    History of tinea corporis 08/23/2017    Hyperlipidemia 08/23/2017    RX = simvastatin    Hypertension     RX = Lisinopril/HCTZ    Hypokalemia 8/23/2017    Left thyroid nodule 8/23/2017    Lower extremity edema 08/23/2017    Lumbar disc disease 8/23/2017    Menopause 8/23/2017    Morbid obesity ([address] 8/23/2017    Overactive bladder     Rhinitis, allergic 8/23/2017    Urge incontinence 08/23/2017    RX = Myrbetrqi    Vitamin D deficiency 8/23/2017     Past Surgical History:   Procedure Laterality Date    COLONOSCOPY N/A 2/9/2018    COL[facility]  11/12/2014         HX CATARACT REMOVAL Right     HX HEENT      NODULES-VOCAL CORD    HX ORTHOPAEDIC  2004    ELIGIO KNEE REPLACEMENT    HX TONSILLECTOMY  AS CHILD    HX TUBAL LIGATION      MA ABDOMEN SURGERY PROC UNLISTED      HERNIA      Family History   Problem Relation Age of Onset    Diabetes Mother     Cancer Father     Hypertension Sister     Hypertension Brother     Heart Disease Brother      Social History     Tobacco Use    Smoking status: Never Smoker    Smokeless tobacco: Never Used   Substance Use Topics    Alcohol use: No      Prior to Admission medications    Medication Sig Start Date End Date Taking? Authorizing Provider   RABEprazole (ACIPHEX) 20 mg TbEC Take 1 Tablet by mouth daily for 360 days.  TAKE 1 TABLET BY MOUTH EVERY DAY  Indications: gastroesophageal reflux disease, heartburn 6/2/22 5/28/23 Yes Raysa Castillo MD   atorvastatin (LIPITOR) 40 mg tablet Take 1 Tablet by mouth daily for 360 days. 6/2/22 5/28/23 Yes Raysa Castillo MD   tolterodine ER (Detrol LA) 4 mg ER capsule  4/12/21  Yes Provider, Historical   semaglutide (Ozempic) 0.25 mg or 0.5 mg/dose (2 mg/1.5 ml) subq pen 0.5 mg by SubCUTAneous route every seven (7) days for 56 days. 5/27/22 7/22/22 Yes Raysa Castillo MD   lisinopril-hydroCHLOROthiazide (PRINZIDE, ZESTORETIC) 10-12.5 mg per tablet TAKE 1 TABLET DAILY 9/20/21  Yes Dave Snyder, RIVKA   potassium chloride SA (MICRO-K) 10 mEq capsule TAKE 2 CAPSULES DAILY 9/25/20  Yes Baldo Snyder NP   biotin (VITAMIN B7) 5 mg capsule Take 10 mg by mouth. Yes Provider, Historical   clotrimazole-betamethasone (LOTRISONE) topical cream Apply  to affected area two (2) times a day. Yes Provider, Historical   mirabegron ER (MYRBETRIQ) 25 mg ER tablet Take 25 mg by mouth daily. Yes Provider, Historical   ERGOCALCIFEROL, VITAMIN D2, (VITAMIN D2 PO) Take  by mouth. Yes Provider, Historical   omega-3 fatty acids-vitamin e (FISH OIL) 1,000 mg cap Take 1 Cap by mouth daily. Yes Provider, Historical   azithromycin (ZITHROMAX) 250 mg tablet Take 2 tablets initially then one tablet daily until completed  Patient not taking: Reported on 12/7/2021 3/19/20   Dave Snyder NP   diclofenac (VOLTAREN) 1 % gel Apply  to affected area four (4) times daily. Patient not taking: Reported on 12/7/2021    Provider, Historical      Allergies   Allergen Reactions    Adhesive Tape-Silicones Unknown (comments)       Review of Systems:  A comprehensive review of systems was negative except for that written in the History of Present Illness.     Objective:     Visit Vitals  BP (!) 153/87 (BP 1 Location: Left upper arm, BP Patient Position: Sitting, BP Cuff Size: Large adult)   Pulse 90   Temp 97.1 °F (36.2 °C) (Temporal)   Resp 18 Ht 5' 5\" (1.651 m)   Wt (!) 182.9 kg (403 lb 3.2 oz)   SpO2 100%   BMI 67.10 kg/m²         Physical Exam:  Physical Exam:  General:  Alert, cooperative, no distress, appears stated age. Morbidly obese   Eyes:  Conjunctivae/corneas clear. Ears:  Normal external ear canals both ears. Nose: Nares normal. Septum midline. Mouth/Throat: Lips, mucosa, and tongue normal. Teeth and gums normal.   Neck: Supple, symmetrical, trachea midline   Back:   Symmetric, no curvature. ROM normal.    Lungs:   Clear to auscultation bilaterally. Heart:  Regular rate and rhythm   Abdomen:   Soft, non-tender. Bowel sounds normal. No masses,  No organomegaly. Extremities: Extremities normal, atraumatic, no cyanosis or edema. Skin: Palpable cyst on left thigh         Assessment:     75year old female with asymptomatic left cyst    Plan:     Discussed the risk of surgery including bleeding, infection, and recurrence. At this point, the patient does not want the cyst excised. US consistent with a cyst. I Instructed the patient to follow up as needed.

## 2022-06-14 NOTE — LETTER
6/14/2022    Patient: Bird Emery   YOB: 1955   Date of Visit: 6/14/2022     Yancy Peterson MD  [address]  [address] [address]  [address] [facility], [address] [place], [place] [address] [address] [facility]  [address] [address]  Via Fax: [phone]    Dear MD Kaia Ware MD,      Thank you for referring Ms. Mehran Kramer to [address] [place] [address] for evaluation. My notes for this consultation are attached. If you have questions, please do not hesitate to call me. I look forward to following your patient along with you.       Sincerely,    Anthony Gomez MD

## 2022-06-14 NOTE — PROGRESS NOTES
Chief Complaint   Patient presents with   [address]     cyst on left thigh for a month     3 most recent PHQ Screens 6/14/2022   Little interest or pleasure in doing things Not at all   Feeling down, depressed, irritable, or hopeless Not at all   Total Score PHQ 2 0     Abuse Screening Questionnaire 6/14/2022   Do you ever feel afraid of your partner? N   Are you in a relationship with someone who physically or mentally threatens you? N   Is it safe for you to go home? Y     Visit Vitals  BP (!) 153/87 (BP 1 Location: Left upper arm, BP Patient Position: Sitting, BP Cuff Size: Large adult)   Pulse 90   Temp 97.1 °F (36.2 °C) (Temporal)   Resp 18   Ht 5' 5\" (1.651 m)   Wt (!) 182.9 kg (403 lb 3.2 oz)   SpO2 100%   BMI 67.10 kg/m²     1. \"Have you been to the ER, urgent care clinic since your last visit? Hospitalized since your last visit? \" [facility] 06/03/22    2. \"Have you seen or consulted any other health care providers outside of the [address] [place] [address] since your last visit? \" no

## 2022-06-29 PROBLEM — I25.10 CORONARY ARTERY CALCIFICATION: Status: ACTIVE | Noted: 2017-12-01

## 2022-06-29 PROBLEM — I25.84 CORONARY ARTERY CALCIFICATION: Status: ACTIVE | Noted: 2017-12-01

## 2022-06-29 PROBLEM — N39.41 URGE INCONTINENCE: Status: ACTIVE | Noted: 2017-08-23

## 2022-08-29 RX ORDER — LISINOPRIL AND HYDROCHLOROTHIAZIDE 10; 12.5 MG/1; MG/1
1 TABLET ORAL DAILY
Qty: 90 TABLET | Refills: 3 | Status: SHIPPED | OUTPATIENT
Start: 2022-08-29

## 2022-08-29 NOTE — TELEPHONE ENCOUNTER
PCP: Miko Santacruz MD    Last appt: 2022  No future appointments. Requested Prescriptions     Pending Prescriptions Disp Refills    lisinopril-hydroCHLOROthiazide (PRINZIDE, ZESTORETIC) 10-12.5 mg per tablet 90 Tablet 3     Si Tablet daily.        Prior labs and Blood pressures:  BP Readings from Last 3 Encounters:   22 (!) 153/87   22 136/84   21 130/78     Lab Results   Component Value Date/Time    Sodium 139 2022 11:04 AM    Potassium 4.1 2022 11:04 AM    Chloride 105 2022 11:04 AM    CO2 30 2022 11:04 AM    Anion gap 4 (L) 2022 11:04 AM    Glucose 86 2022 11:04 AM    BUN 16 2022 11:04 AM    Creatinine 0.71 2022 11:04 AM    BUN/Creatinine ratio 23 (H) 2022 11:04 AM    GFR est AA >60 2022 11:04 AM    GFR est non-AA >60 2022 11:04 AM    Calcium 9.6 2022 11:04 AM     Lab Results   Component Value Date/Time    Hemoglobin A1c 5.2 2021 12:27 PM     Lab Results   Component Value Date/Time    Cholesterol, total 182 2021 12:27 PM    HDL Cholesterol 53 2021 12:27 PM    LDL, calculated 115.8 (H) 2021 12:27 PM    VLDL, calculated 13.2 2021 12:27 PM    Triglyceride 66 2021 12:27 PM    CHOL/HDL Ratio 3.4 2021 12:27 PM     Lab Results   Component Value Date/Time    Vitamin D 25-Hydroxy 18.7 (L) 2022 11:04 AM       Lab Results   Component Value Date/Time    TSH 2.76 2021 12:27 PM    TSH, 3rd generation 3.33 2020 11:22 AM

## 2022-09-02 RX ORDER — POTASSIUM CHLORIDE 750 MG/1
CAPSULE, EXTENDED RELEASE ORAL
Qty: 180 CAPSULE | Refills: 3 | Status: SHIPPED | OUTPATIENT
Start: 2022-09-02

## 2022-09-02 NOTE — TELEPHONE ENCOUNTER
PCP: Ari Estevez MD    Last appt: 4/1/2022  No future appointments.     Requested Prescriptions     Pending Prescriptions Disp Refills    potassium chloride SA (MICRO-K) 10 mEq capsule 180 Capsule 3     Sig: TAKE 2 CAPSULES DAILY       Prior labs and Blood pressures:  BP Readings from Last 3 Encounters:   06/14/22 (!) 153/87   04/01/22 136/84   12/07/21 130/78     Lab Results   Component Value Date/Time    Sodium 139 04/01/2022 11:04 AM    Potassium 4.1 04/01/2022 11:04 AM    Chloride 105 04/01/2022 11:04 AM    CO2 30 04/01/2022 11:04 AM    Anion gap 4 (L) 04/01/2022 11:04 AM    Glucose 86 04/01/2022 11:04 AM    BUN 16 04/01/2022 11:04 AM    Creatinine 0.71 04/01/2022 11:04 AM    BUN/Creatinine ratio 23 (H) 04/01/2022 11:04 AM    GFR est AA >60 04/01/2022 11:04 AM    GFR est non-AA >60 04/01/2022 11:04 AM    Calcium 9.6 04/01/2022 11:04 AM     Lab Results   Component Value Date/Time    Hemoglobin A1c 5.2 12/07/2021 12:27 PM     Lab Results   Component Value Date/Time    Cholesterol, total 182 12/07/2021 12:27 PM    HDL Cholesterol 53 12/07/2021 12:27 PM    LDL, calculated 115.8 (H) 12/07/2021 12:27 PM    VLDL, calculated 13.2 12/07/2021 12:27 PM    Triglyceride 66 12/07/2021 12:27 PM    CHOL/HDL Ratio 3.4 12/07/2021 12:27 PM     Lab Results   Component Value Date/Time    Vitamin D 25-Hydroxy 18.7 (L) 04/01/2022 11:04 AM       Lab Results   Component Value Date/Time    TSH 2.76 12/07/2021 12:27 PM    TSH, 3rd generation 3.33 11/24/2020 11:22 AM

## 2023-05-31 ENCOUNTER — OFFICE VISIT (OUTPATIENT)
Age: 68
End: 2023-05-31
Payer: MEDICARE

## 2023-05-31 VITALS
OXYGEN SATURATION: 100 % | TEMPERATURE: 97 F | SYSTOLIC BLOOD PRESSURE: 153 MMHG | HEART RATE: 80 BPM | DIASTOLIC BLOOD PRESSURE: 82 MMHG | BODY MASS INDEX: 51.91 KG/M2 | WEIGHT: 293 LBS | RESPIRATION RATE: 16 BRPM | HEIGHT: 63 IN

## 2023-05-31 DIAGNOSIS — Z13.1 SCREENING FOR DIABETES MELLITUS: ICD-10-CM

## 2023-05-31 DIAGNOSIS — E78.5 HYPERLIPIDEMIA, UNSPECIFIED HYPERLIPIDEMIA TYPE: ICD-10-CM

## 2023-05-31 DIAGNOSIS — Z79.899 ENCOUNTER FOR LONG-TERM (CURRENT) USE OF MEDICATIONS: ICD-10-CM

## 2023-05-31 DIAGNOSIS — I10 PRIMARY HYPERTENSION: ICD-10-CM

## 2023-05-31 DIAGNOSIS — Z76.89 ESTABLISHING CARE WITH NEW DOCTOR, ENCOUNTER FOR: Primary | ICD-10-CM

## 2023-05-31 PROBLEM — R51.9 HEADACHE: Status: RESOLVED | Noted: 2017-08-23 | Resolved: 2023-05-31

## 2023-05-31 PROBLEM — R10.9 ABDOMINAL PAIN: Status: RESOLVED | Noted: 2017-08-23 | Resolved: 2023-05-31

## 2023-05-31 PROBLEM — R10.30 LOWER ABDOMINAL PAIN: Status: RESOLVED | Noted: 2017-08-23 | Resolved: 2023-05-31

## 2023-05-31 PROBLEM — R05.9 COUGH: Status: RESOLVED | Noted: 2017-08-23 | Resolved: 2023-05-31

## 2023-05-31 PROBLEM — E87.6 HYPOKALEMIA: Status: RESOLVED | Noted: 2017-08-23 | Resolved: 2023-05-31

## 2023-05-31 PROCEDURE — 99203 OFFICE O/P NEW LOW 30 MIN: CPT | Performed by: FAMILY MEDICINE

## 2023-05-31 PROCEDURE — G8428 CUR MEDS NOT DOCUMENT: HCPCS | Performed by: FAMILY MEDICINE

## 2023-05-31 PROCEDURE — 3077F SYST BP >= 140 MM HG: CPT | Performed by: FAMILY MEDICINE

## 2023-05-31 PROCEDURE — 3017F COLORECTAL CA SCREEN DOC REV: CPT | Performed by: FAMILY MEDICINE

## 2023-05-31 PROCEDURE — 1123F ACP DISCUSS/DSCN MKR DOCD: CPT | Performed by: FAMILY MEDICINE

## 2023-05-31 PROCEDURE — 1090F PRES/ABSN URINE INCON ASSESS: CPT | Performed by: FAMILY MEDICINE

## 2023-05-31 PROCEDURE — G8400 PT W/DXA NO RESULTS DOC: HCPCS | Performed by: FAMILY MEDICINE

## 2023-05-31 PROCEDURE — G8417 CALC BMI ABV UP PARAM F/U: HCPCS | Performed by: FAMILY MEDICINE

## 2023-05-31 PROCEDURE — 1036F TOBACCO NON-USER: CPT | Performed by: FAMILY MEDICINE

## 2023-05-31 PROCEDURE — 3079F DIAST BP 80-89 MM HG: CPT | Performed by: FAMILY MEDICINE

## 2023-05-31 RX ORDER — METOPROLOL SUCCINATE 25 MG/1
25 TABLET, EXTENDED RELEASE ORAL DAILY
Qty: 90 TABLET | Refills: 1 | Status: SHIPPED | OUTPATIENT
Start: 2023-05-31

## 2023-05-31 RX ORDER — DICLOFENAC POTASSIUM 50 MG/1
TABLET, FILM COATED ORAL
COMMUNITY

## 2023-05-31 RX ORDER — ATORVASTATIN CALCIUM 40 MG/1
40 TABLET, FILM COATED ORAL DAILY
Qty: 30 TABLET | Refills: 11 | Status: SHIPPED | OUTPATIENT
Start: 2023-05-31 | End: 2024-05-28

## 2023-05-31 RX ORDER — CHLORAL HYDRATE 500 MG
CAPSULE ORAL
COMMUNITY
Start: 2014-10-01

## 2023-05-31 RX ORDER — SIMVASTATIN 40 MG
TABLET ORAL
COMMUNITY
Start: 2014-10-01 | End: 2023-05-31 | Stop reason: ALTCHOICE

## 2023-05-31 SDOH — ECONOMIC STABILITY: HOUSING INSECURITY
IN THE LAST 12 MONTHS, WAS THERE A TIME WHEN YOU DID NOT HAVE A STEADY PLACE TO SLEEP OR SLEPT IN A SHELTER (INCLUDING NOW)?: NO

## 2023-05-31 SDOH — ECONOMIC STABILITY: INCOME INSECURITY: HOW HARD IS IT FOR YOU TO PAY FOR THE VERY BASICS LIKE FOOD, HOUSING, MEDICAL CARE, AND HEATING?: NOT HARD AT ALL

## 2023-05-31 SDOH — ECONOMIC STABILITY: FOOD INSECURITY: WITHIN THE PAST 12 MONTHS, YOU WORRIED THAT YOUR FOOD WOULD RUN OUT BEFORE YOU GOT MONEY TO BUY MORE.: NEVER TRUE

## 2023-05-31 SDOH — ECONOMIC STABILITY: FOOD INSECURITY: WITHIN THE PAST 12 MONTHS, THE FOOD YOU BOUGHT JUST DIDN'T LAST AND YOU DIDN'T HAVE MONEY TO GET MORE.: NEVER TRUE

## 2023-05-31 ASSESSMENT — PATIENT HEALTH QUESTIONNAIRE - PHQ9
SUM OF ALL RESPONSES TO PHQ QUESTIONS 1-9: 0
2. FEELING DOWN, DEPRESSED OR HOPELESS: 0
SUM OF ALL RESPONSES TO PHQ9 QUESTIONS 1 & 2: 0
1. LITTLE INTEREST OR PLEASURE IN DOING THINGS: 0
SUM OF ALL RESPONSES TO PHQ QUESTIONS 1-9: 0

## 2023-05-31 ASSESSMENT — ANXIETY QUESTIONNAIRES
GAD7 TOTAL SCORE: 0
2. NOT BEING ABLE TO STOP OR CONTROL WORRYING: 0
3. WORRYING TOO MUCH ABOUT DIFFERENT THINGS: 0
6. BECOMING EASILY ANNOYED OR IRRITABLE: 0
1. FEELING NERVOUS, ANXIOUS, OR ON EDGE: 0
IF YOU CHECKED OFF ANY PROBLEMS ON THIS QUESTIONNAIRE, HOW DIFFICULT HAVE THESE PROBLEMS MADE IT FOR YOU TO DO YOUR WORK, TAKE CARE OF THINGS AT HOME, OR GET ALONG WITH OTHER PEOPLE: NOT DIFFICULT AT ALL
4. TROUBLE RELAXING: 0
7. FEELING AFRAID AS IF SOMETHING AWFUL MIGHT HAPPEN: 0
5. BEING SO RESTLESS THAT IT IS HARD TO SIT STILL: 0

## 2023-05-31 NOTE — PROGRESS NOTES
Kae Flannery is a 70 y.o. female, who's a new patient to our practice. Previous PCP: she saw and establish care w another PCP SABI Rich 04/2022 14 months ago. has a past medical history of Chronic pain, Coronary artery calcification, Gastroesophageal reflux disease with hiatal hernia, History of gallstones, History of right breast biopsy, History of tinea corporis, Hyperlipidemia, Hypertension, Hypokalemia, Left thyroid nodule, Lower extremity edema, Lumbar disc disease, Menopause, Morbid obesity ([address] Osteoarthritis, multiple sites, Rhinitis, allergic, Urge incontinence, and Vitamin D deficiency. Assessment/Plans:    Amy Cortez was seen today for new patient. Diagnoses and all orders for this visit:    Establishing care with new doctor, encounter for    Primary hypertension  -     metoprolol succinate (TOPROL XL) 25 MG extended release tablet; Take 1 tablet by mouth daily  -     Cancel: Comprehensive Metabolic Panel; Future  -     Cancel: TSH; Future  -     Comprehensive Metabolic Panel  -     TSH    Body mass index (BMI) 70 or greater, adult (HCC)    Hyperlipidemia, unspecified hyperlipidemia type  -     atorvastatin (LIPITOR) 40 MG tablet; Take 1 tablet by mouth daily  -     Cancel: Comprehensive Metabolic Panel; Future  -     Cancel: TSH; Future  -     Cancel: Lipid Panel; Future  -     Comprehensive Metabolic Panel  -     TSH  -     Lipid Panel    Encounter for long-term (current) use of medications  -     Cancel: CBC; Future  -     Cancel: Comprehensive Metabolic Panel; Future  -     Cancel: TSH; Future  -     Cancel: Lipid Panel; Future  -     Cancel: Hemoglobin A1C; Future  -     CBC  -     Comprehensive Metabolic Panel  -     TSH  -     Lipid Panel  -     Hemoglobin A1C    Screening for diabetes mellitus  -     Cancel: Hemoglobin A1C; Future  -     Hemoglobin A1C      Discussed plans, risk/benefits of treatments/observations.    Through the use of shared decision making, above plans were agreed

## 2023-06-01 LAB
ALBUMIN SERPL-MCNC: 4.2 G/DL (ref 3.8–4.8)
ALBUMIN/GLOB SERPL: 1.5 {RATIO} (ref 1.2–2.2)
ALP SERPL-CCNC: 107 IU/L (ref 44–121)
ALT SERPL-CCNC: 8 IU/L (ref 0–32)
AST SERPL-CCNC: 11 IU/L (ref 0–40)
BILIRUB SERPL-MCNC: 0.5 MG/DL (ref 0–1.2)
BUN SERPL-MCNC: 12 MG/DL (ref 8–27)
BUN/CREAT SERPL: 18 (ref 12–28)
CALCIUM SERPL-MCNC: 9.5 MG/DL (ref 8.7–10.3)
CHLORIDE SERPL-SCNC: 106 MMOL/L (ref 96–106)
CHOLEST SERPL-MCNC: 192 MG/DL (ref 100–199)
CO2 SERPL-SCNC: 24 MMOL/L (ref 20–29)
CREAT SERPL-MCNC: 0.66 MG/DL (ref 0.57–1)
EGFRCR SERPLBLD CKD-EPI 2021: 95 ML/MIN/1.73
ERYTHROCYTE [DISTWIDTH] IN BLOOD BY AUTOMATED COUNT: 13.2 % (ref 11.7–15.4)
GLOBULIN SER CALC-MCNC: 2.8 G/DL (ref 1.5–4.5)
GLUCOSE SERPL-MCNC: 90 MG/DL (ref 70–99)
HBA1C MFR BLD: 5.4 % (ref 4.8–5.6)
HCT VFR BLD AUTO: 38 % (ref 34–46.6)
HDLC SERPL-MCNC: 49 MG/DL
HGB BLD-MCNC: 11.8 G/DL (ref 11.1–15.9)
LDLC SERPL CALC-MCNC: 133 MG/DL (ref 0–99)
MCH RBC QN AUTO: 26.8 PG (ref 26.6–33)
MCHC RBC AUTO-ENTMCNC: 31.1 G/DL (ref 31.5–35.7)
MCV RBC AUTO: 86 FL (ref 79–97)
PLATELET # BLD AUTO: 192 X10E3/UL (ref 150–450)
POTASSIUM SERPL-SCNC: 4.3 MMOL/L (ref 3.5–5.2)
PROT SERPL-MCNC: 7 G/DL (ref 6–8.5)
RBC # BLD AUTO: 4.4 X10E6/UL (ref 3.77–5.28)
SODIUM SERPL-SCNC: 143 MMOL/L (ref 134–144)
TRIGL SERPL-MCNC: 56 MG/DL (ref 0–149)
TSH SERPL DL<=0.005 MIU/L-ACNC: 3 UIU/ML (ref 0.45–4.5)
VLDLC SERPL CALC-MCNC: 10 MG/DL (ref 5–40)
WBC # BLD AUTO: 3.6 X10E3/UL (ref 3.4–10.8)

## 2023-06-22 ENCOUNTER — OFFICE VISIT (OUTPATIENT)
Age: 68
End: 2023-06-22
Payer: MEDICARE

## 2023-06-22 VITALS
SYSTOLIC BLOOD PRESSURE: 144 MMHG | WEIGHT: 293 LBS | OXYGEN SATURATION: 97 % | DIASTOLIC BLOOD PRESSURE: 80 MMHG | BODY MASS INDEX: 51.91 KG/M2 | RESPIRATION RATE: 18 BRPM | HEIGHT: 63 IN | HEART RATE: 77 BPM | TEMPERATURE: 97.3 F

## 2023-06-22 DIAGNOSIS — I10 PRIMARY HYPERTENSION: ICD-10-CM

## 2023-06-22 DIAGNOSIS — Z00.00 MEDICARE ANNUAL WELLNESS VISIT, SUBSEQUENT: Primary | ICD-10-CM

## 2023-06-22 DIAGNOSIS — Z71.3 WEIGHT LOSS COUNSELING, ENCOUNTER FOR: ICD-10-CM

## 2023-06-22 DIAGNOSIS — E78.5 HYPERLIPIDEMIA, UNSPECIFIED HYPERLIPIDEMIA TYPE: ICD-10-CM

## 2023-06-22 DIAGNOSIS — Z79.899 ENCOUNTER FOR LONG-TERM (CURRENT) USE OF MEDICATIONS: ICD-10-CM

## 2023-06-22 PROCEDURE — 99213 OFFICE O/P EST LOW 20 MIN: CPT | Performed by: FAMILY MEDICINE

## 2023-06-22 PROCEDURE — G8417 CALC BMI ABV UP PARAM F/U: HCPCS | Performed by: FAMILY MEDICINE

## 2023-06-22 PROCEDURE — G8400 PT W/DXA NO RESULTS DOC: HCPCS | Performed by: FAMILY MEDICINE

## 2023-06-22 PROCEDURE — 3017F COLORECTAL CA SCREEN DOC REV: CPT | Performed by: FAMILY MEDICINE

## 2023-06-22 PROCEDURE — 1123F ACP DISCUSS/DSCN MKR DOCD: CPT | Performed by: FAMILY MEDICINE

## 2023-06-22 PROCEDURE — 3079F DIAST BP 80-89 MM HG: CPT | Performed by: FAMILY MEDICINE

## 2023-06-22 PROCEDURE — 3077F SYST BP >= 140 MM HG: CPT | Performed by: FAMILY MEDICINE

## 2023-06-22 PROCEDURE — G0439 PPPS, SUBSEQ VISIT: HCPCS | Performed by: FAMILY MEDICINE

## 2023-06-22 PROCEDURE — 1090F PRES/ABSN URINE INCON ASSESS: CPT | Performed by: FAMILY MEDICINE

## 2023-06-22 PROCEDURE — 1036F TOBACCO NON-USER: CPT | Performed by: FAMILY MEDICINE

## 2023-06-22 PROCEDURE — G8427 DOCREV CUR MEDS BY ELIG CLIN: HCPCS | Performed by: FAMILY MEDICINE

## 2023-06-22 RX ORDER — TOPIRAMATE 25 MG/1
25 TABLET ORAL 2 TIMES DAILY
Qty: 180 TABLET | Refills: 0 | Status: SHIPPED | OUTPATIENT
Start: 2023-06-22

## 2023-06-22 ASSESSMENT — LIFESTYLE VARIABLES
HOW OFTEN DO YOU HAVE A DRINK CONTAINING ALCOHOL: NEVER
HOW MANY STANDARD DRINKS CONTAINING ALCOHOL DO YOU HAVE ON A TYPICAL DAY: PATIENT DOES NOT DRINK

## 2023-06-22 ASSESSMENT — PATIENT HEALTH QUESTIONNAIRE - PHQ9
SUM OF ALL RESPONSES TO PHQ QUESTIONS 1-9: 0
1. LITTLE INTEREST OR PLEASURE IN DOING THINGS: 0
2. FEELING DOWN, DEPRESSED OR HOPELESS: 0
SUM OF ALL RESPONSES TO PHQ QUESTIONS 1-9: 0
SUM OF ALL RESPONSES TO PHQ9 QUESTIONS 1 & 2: 0
SUM OF ALL RESPONSES TO PHQ QUESTIONS 1-9: 0
SUM OF ALL RESPONSES TO PHQ QUESTIONS 1-9: 0

## 2023-06-22 NOTE — PROGRESS NOTES
Chief Complaint   Patient presents with    Medicare AW    Hypertension     3 week check    1. Have you been to the ER, urgent care clinic since your last visit? Hospitalized since your last visit? No    2. Have you seen or consulted any other health care providers outside of the [address] [place] [address] since your last visit? Include any pap smears or colon screening.  No

## 2023-06-22 NOTE — PROGRESS NOTES
Medicare Annual Wellness Visit    Taye Schmitt is here for Medicare AWV and Hypertension    2nd visit w this physician,       [address] Medicare annual wellness visit, subsequent  Primary hypertension  Body mass index (BMI) 70 or greater, adult (HCC)  -     topiramate (TOPAMAX) 25 MG tablet; Take 1 tablet by mouth 2 times daily, Disp-180 tablet, R-0Normal  Weight loss counseling, encounter for  -     topiramate (TOPAMAX) 25 MG tablet; Take 1 tablet by mouth 2 times daily, Disp-180 tablet, R-0Normal  Hyperlipidemia, unspecified hyperlipidemia type  Encounter for long-term (current) use of medications    Recommendations for Preventive Services Due: see orders and patient instructions/AVS.  Recommended screening schedule for the next 5-10 years is provided to the patient in written form: see Patient Instructions/AVS.     Return in about 3 months (around 9/22/2023) for weight loss. Subjective     HTN: BP not at goal today. She didn't take her meds. Makes her pee a lot and don't take it when goes out. Lisinopril/hctz 10-25 she asked to change med - d/c lisin/hctz  Start Metoprolol XL 25mg      HLD: tolerating lipitor 40mg     BMI 72, 409 lbs. We discussed diet  Drinks 2 16oz of bottles of pepsi a day  Brkf rarely eat. - wafles, grit or coffee  Usually 1 large meal at 3pm    Steak, or chicken, or pork chop and mash potatoes. Greens. Eat out a lot. Before bed some type of sweet, honey buns. Snacks on kevin   Topamax 25mg BID    Vit d def needs 5000u daily     Urology Dr. Malika Villela for urinary incontinent  Myrbetriq, detrol     OBGYN: at [address] scan was ordered but the machine was not big enough for her. Patient's complete Health Risk Assessment and screening values have been reviewed and are found in Flowsheets. The following problems were reviewed today and where indicated follow up appointments were made and/or referrals ordered.     Positive Risk Factor Screenings with

## 2023-06-22 NOTE — PATIENT INSTRUCTIONS
Preventing Falls: Care Instructions    Talk to your doctor about the medicines you take. Ask if any of them increase the risk of falls and whether they can be changed or stopped. Try to exercise regularly. It can help improve your strength and balance. This can help lower your risk of falling. Practice fall safety and prevention. Wear low-heeled shoes that fit well and give your feet good support. Talk to your doctor if you have foot problems that make this hard. Carry a cellphone or wear a medical alert device that you can use to call for help. Use stepladders instead of chairs to reach high objects. Don't climb if you're at risk for falls. Ask for help, if needed. Wear the correct eyeglasses, if you need them. Make your home safer. Remove rugs, cords, clutter, and furniture from walkways. Keep your house well lit. Use night-lights in hallways and bathrooms. Install and use sturdy handrails on stairways. Wear nonskid footwear, even inside. Don't walk barefoot or in socks without shoes. Be safe outside. Use handrails, curb cuts, and ramps whenever possible. Keep your hands free by using a shoulder bag or backpack. Try to walk in well-lit areas. Watch out for uneven ground, changes in pavement, and debris. Be careful in the winter. Walk on the grass or gravel when sidewalks are slippery. Use de-icer on steps and walkways. Add non-slip devices to shoes. Put grab bars and nonskid mats in your shower or tub and near the toilet. Try to use a shower chair or bath bench when bathing. Get into a tub or shower by putting in your weaker leg first. Get out with your strong side first. Have a phone or medical alert device in the bathroom with you. Where can you learn more? Go to http://www.murphy.com/ and enter G117 to learn more about \"Preventing Falls: Care Instructions. \"  Current as of: November 9, 2022               Content Version: 13.7  © 3879-7094 Healthwise

## 2023-09-12 ENCOUNTER — HOSPITAL ENCOUNTER (INPATIENT)
Facility: HOSPITAL | Age: 68
LOS: 3 days | Discharge: HOME | End: 2023-09-15
Attending: EMERGENCY MEDICINE | Admitting: STUDENT IN AN ORGANIZED HEALTH CARE EDUCATION/TRAINING PROGRAM
Payer: MEDICARE

## 2023-09-12 ENCOUNTER — APPOINTMENT (OUTPATIENT)
Facility: HOSPITAL | Age: 68
End: 2023-09-12
Payer: MEDICARE

## 2023-09-12 DIAGNOSIS — J96.01 ACUTE RESPIRATORY FAILURE WITH HYPOXIA (HCC): ICD-10-CM

## 2023-09-12 DIAGNOSIS — R00.0 TACHYCARDIA: ICD-10-CM

## 2023-09-12 DIAGNOSIS — U07.1 COVID-19: Primary | ICD-10-CM

## 2023-09-12 LAB
ALBUMIN SERPL-MCNC: 3.1 G/DL (ref 3.5–5)
ALBUMIN/GLOB SERPL: 0.9 (ref 1.1–2.2)
ALP SERPL-CCNC: 109 U/L (ref 45–117)
ALT SERPL-CCNC: 14 U/L (ref 12–78)
ANION GAP BLD CALC-SCNC: 10 (ref 10–20)
ANION GAP SERPL CALC-SCNC: 7 MMOL/L (ref 5–15)
AST SERPL-CCNC: 12 U/L (ref 15–37)
BASE EXCESS BLD CALC-SCNC: 1.1 MMOL/L
BASOPHILS # BLD: 0 K/UL (ref 0–0.1)
BASOPHILS NFR BLD: 0 % (ref 0–1)
BILIRUB SERPL-MCNC: 1.7 MG/DL (ref 0.2–1)
BUN SERPL-MCNC: 15 MG/DL (ref 6–20)
BUN/CREAT SERPL: 20 (ref 12–20)
CA-I BLD-MCNC: 1.2 MMOL/L (ref 1.12–1.32)
CALCIUM SERPL-MCNC: 8.4 MG/DL (ref 8.5–10.1)
CHLORIDE BLD-SCNC: 107 MMOL/L (ref 100–108)
CHLORIDE SERPL-SCNC: 110 MMOL/L (ref 97–108)
CO2 BLD-SCNC: 26 MMOL/L (ref 19–24)
CO2 SERPL-SCNC: 24 MMOL/L (ref 21–32)
CREAT SERPL-MCNC: 0.75 MG/DL (ref 0.55–1.02)
CREAT UR-MCNC: 0.6 MG/DL (ref 0.6–1.3)
DIFFERENTIAL METHOD BLD: ABNORMAL
EOSINOPHIL # BLD: 0 K/UL (ref 0–0.4)
EOSINOPHIL NFR BLD: 0 % (ref 0–7)
ERYTHROCYTE [DISTWIDTH] IN BLOOD BY AUTOMATED COUNT: 14.4 % (ref 11.5–14.5)
FLUAV AG NPH QL IA: NEGATIVE
FLUBV AG NOSE QL IA: NEGATIVE
GLOBULIN SER CALC-MCNC: 3.6 G/DL (ref 2–4)
GLUCOSE BLD STRIP.AUTO-MCNC: 91 MG/DL (ref 74–106)
GLUCOSE SERPL-MCNC: 96 MG/DL (ref 65–100)
HCO3 BLDA-SCNC: 26 MMOL/L
HCT VFR BLD AUTO: 37 % (ref 35–47)
HGB BLD-MCNC: 11.7 G/DL (ref 11.5–16)
IMM GRANULOCYTES # BLD AUTO: 0.1 K/UL (ref 0–0.04)
IMM GRANULOCYTES NFR BLD AUTO: 1 % (ref 0–0.5)
LACTATE BLD-SCNC: 0.96 MMOL/L (ref 0.4–2)
LYMPHOCYTES # BLD: 0.3 K/UL (ref 0.8–3.5)
LYMPHOCYTES NFR BLD: 4 % (ref 12–49)
MCH RBC QN AUTO: 27.9 PG (ref 26–34)
MCHC RBC AUTO-ENTMCNC: 31.6 G/DL (ref 30–36.5)
MCV RBC AUTO: 88.1 FL (ref 80–99)
MONOCYTES # BLD: 0.4 K/UL (ref 0–1)
MONOCYTES NFR BLD: 5 % (ref 5–13)
NEUTS SEG # BLD: 7.7 K/UL (ref 1.8–8)
NEUTS SEG NFR BLD: 90 % (ref 32–75)
NRBC # BLD: 0 K/UL (ref 0–0.01)
NRBC BLD-RTO: 0 PER 100 WBC
PCO2 BLDV: 40 MMHG (ref 41–51)
PH BLDV: 7.42 (ref 7.32–7.42)
PLATELET # BLD AUTO: 151 K/UL (ref 150–400)
PMV BLD AUTO: 11.9 FL (ref 8.9–12.9)
PO2 BLDV: 28 MMHG (ref 25–40)
POTASSIUM BLD-SCNC: 3.8 MMOL/L (ref 3.5–5.5)
POTASSIUM SERPL-SCNC: 3.3 MMOL/L (ref 3.5–5.1)
PROCALCITONIN SERPL-MCNC: 9.77 NG/ML
PROT SERPL-MCNC: 6.7 G/DL (ref 6.4–8.2)
RBC # BLD AUTO: 4.2 M/UL (ref 3.8–5.2)
RBC MORPH BLD: ABNORMAL
SAO2 % BLD: 53 %
SARS-COV-2 RDRP RESP QL NAA+PROBE: DETECTED
SODIUM BLD-SCNC: 143 MMOL/L (ref 136–145)
SODIUM SERPL-SCNC: 141 MMOL/L (ref 136–145)
SOURCE: ABNORMAL
SPECIMEN SITE: ABNORMAL
TROPONIN I SERPL HS-MCNC: 20 NG/L (ref 0–51)
WBC # BLD AUTO: 8.5 K/UL (ref 3.6–11)

## 2023-09-12 PROCEDURE — 71045 X-RAY EXAM CHEST 1 VIEW: CPT

## 2023-09-12 PROCEDURE — 6370000000 HC RX 637 (ALT 250 FOR IP): Performed by: EMERGENCY MEDICINE

## 2023-09-12 PROCEDURE — 87804 INFLUENZA ASSAY W/OPTIC: CPT

## 2023-09-12 PROCEDURE — 84295 ASSAY OF SERUM SODIUM: CPT

## 2023-09-12 PROCEDURE — 87635 SARS-COV-2 COVID-19 AMP PRB: CPT

## 2023-09-12 PROCEDURE — 99285 EMERGENCY DEPT VISIT HI MDM: CPT

## 2023-09-12 PROCEDURE — 1100000003 HC PRIVATE W/ TELEMETRY

## 2023-09-12 PROCEDURE — 85025 COMPLETE CBC W/AUTO DIFF WBC: CPT

## 2023-09-12 PROCEDURE — 84132 ASSAY OF SERUM POTASSIUM: CPT

## 2023-09-12 PROCEDURE — 80053 COMPREHEN METABOLIC PANEL: CPT

## 2023-09-12 PROCEDURE — 96375 TX/PRO/DX INJ NEW DRUG ADDON: CPT

## 2023-09-12 PROCEDURE — 87186 SC STD MICRODIL/AGAR DIL: CPT

## 2023-09-12 PROCEDURE — 84145 PROCALCITONIN (PCT): CPT

## 2023-09-12 PROCEDURE — 87150 DNA/RNA AMPLIFIED PROBE: CPT

## 2023-09-12 PROCEDURE — 96374 THER/PROPH/DIAG INJ IV PUSH: CPT

## 2023-09-12 PROCEDURE — 2580000003 HC RX 258: Performed by: EMERGENCY MEDICINE

## 2023-09-12 PROCEDURE — 96361 HYDRATE IV INFUSION ADD-ON: CPT

## 2023-09-12 PROCEDURE — 82803 BLOOD GASES ANY COMBINATION: CPT

## 2023-09-12 PROCEDURE — 82330 ASSAY OF CALCIUM: CPT

## 2023-09-12 PROCEDURE — 36415 COLL VENOUS BLD VENIPUNCTURE: CPT

## 2023-09-12 PROCEDURE — 87077 CULTURE AEROBIC IDENTIFY: CPT

## 2023-09-12 PROCEDURE — 87040 BLOOD CULTURE FOR BACTERIA: CPT

## 2023-09-12 PROCEDURE — 84484 ASSAY OF TROPONIN QUANT: CPT

## 2023-09-12 PROCEDURE — 6360000002 HC RX W HCPCS: Performed by: EMERGENCY MEDICINE

## 2023-09-12 PROCEDURE — 82947 ASSAY GLUCOSE BLOOD QUANT: CPT

## 2023-09-12 RX ORDER — DEXAMETHASONE 4 MG/1
6 TABLET ORAL DAILY
Status: DISCONTINUED | OUTPATIENT
Start: 2023-09-13 | End: 2023-09-15 | Stop reason: HOSPADM

## 2023-09-12 RX ORDER — ONDANSETRON 2 MG/ML
4 INJECTION INTRAMUSCULAR; INTRAVENOUS ONCE
Status: COMPLETED | OUTPATIENT
Start: 2023-09-12 | End: 2023-09-12

## 2023-09-12 RX ORDER — SODIUM CHLORIDE 9 MG/ML
INJECTION, SOLUTION INTRAVENOUS PRN
Status: DISCONTINUED | OUTPATIENT
Start: 2023-09-12 | End: 2023-09-15 | Stop reason: HOSPADM

## 2023-09-12 RX ORDER — IBUPROFEN 400 MG/1
400 TABLET ORAL
Status: COMPLETED | OUTPATIENT
Start: 2023-09-12 | End: 2023-09-12

## 2023-09-12 RX ORDER — ATORVASTATIN CALCIUM 40 MG/1
40 TABLET, FILM COATED ORAL DAILY
Status: DISCONTINUED | OUTPATIENT
Start: 2023-09-13 | End: 2023-09-15 | Stop reason: HOSPADM

## 2023-09-12 RX ORDER — SODIUM CHLORIDE 0.9 % (FLUSH) 0.9 %
5-40 SYRINGE (ML) INJECTION PRN
Status: DISCONTINUED | OUTPATIENT
Start: 2023-09-12 | End: 2023-09-15 | Stop reason: HOSPADM

## 2023-09-12 RX ORDER — KETOROLAC TROMETHAMINE 30 MG/ML
15 INJECTION, SOLUTION INTRAMUSCULAR; INTRAVENOUS
Status: COMPLETED | OUTPATIENT
Start: 2023-09-12 | End: 2023-09-12

## 2023-09-12 RX ORDER — ACETAMINOPHEN 325 MG/1
650 TABLET ORAL EVERY 6 HOURS PRN
Status: DISCONTINUED | OUTPATIENT
Start: 2023-09-12 | End: 2023-09-12

## 2023-09-12 RX ORDER — ACETAMINOPHEN 650 MG/1
650 SUPPOSITORY RECTAL EVERY 6 HOURS PRN
Status: DISCONTINUED | OUTPATIENT
Start: 2023-09-12 | End: 2023-09-15 | Stop reason: HOSPADM

## 2023-09-12 RX ORDER — ENOXAPARIN SODIUM 100 MG/ML
60 INJECTION SUBCUTANEOUS EVERY 12 HOURS
Status: DISCONTINUED | OUTPATIENT
Start: 2023-09-12 | End: 2023-09-15 | Stop reason: HOSPADM

## 2023-09-12 RX ORDER — SODIUM CHLORIDE 0.9 % (FLUSH) 0.9 %
5-40 SYRINGE (ML) INJECTION EVERY 12 HOURS SCHEDULED
Status: DISCONTINUED | OUTPATIENT
Start: 2023-09-12 | End: 2023-09-15 | Stop reason: HOSPADM

## 2023-09-12 RX ORDER — ONDANSETRON 2 MG/ML
4 INJECTION INTRAMUSCULAR; INTRAVENOUS EVERY 6 HOURS PRN
Status: DISCONTINUED | OUTPATIENT
Start: 2023-09-12 | End: 2023-09-15 | Stop reason: HOSPADM

## 2023-09-12 RX ORDER — VITAMIN B COMPLEX
2000 TABLET ORAL DAILY
Status: DISCONTINUED | OUTPATIENT
Start: 2023-09-13 | End: 2023-09-15 | Stop reason: HOSPADM

## 2023-09-12 RX ORDER — 0.9 % SODIUM CHLORIDE 0.9 %
1000 INTRAVENOUS SOLUTION INTRAVENOUS ONCE
Status: COMPLETED | OUTPATIENT
Start: 2023-09-12 | End: 2023-09-12

## 2023-09-12 RX ORDER — ONDANSETRON 4 MG/1
4 TABLET, ORALLY DISINTEGRATING ORAL EVERY 8 HOURS PRN
Status: DISCONTINUED | OUTPATIENT
Start: 2023-09-12 | End: 2023-09-15 | Stop reason: HOSPADM

## 2023-09-12 RX ORDER — 0.9 % SODIUM CHLORIDE 0.9 %
1000 INTRAVENOUS SOLUTION INTRAVENOUS ONCE
Status: COMPLETED | OUTPATIENT
Start: 2023-09-12 | End: 2023-09-13

## 2023-09-12 RX ORDER — ACETAMINOPHEN 500 MG
1000 TABLET ORAL
Status: COMPLETED | OUTPATIENT
Start: 2023-09-12 | End: 2023-09-12

## 2023-09-12 RX ORDER — POLYETHYLENE GLYCOL 3350 17 G/17G
17 POWDER, FOR SOLUTION ORAL DAILY PRN
Status: DISCONTINUED | OUTPATIENT
Start: 2023-09-12 | End: 2023-09-15 | Stop reason: HOSPADM

## 2023-09-12 RX ORDER — TROSPIUM CHLORIDE 20 MG/1
20 TABLET, FILM COATED ORAL
Status: DISCONTINUED | OUTPATIENT
Start: 2023-09-13 | End: 2023-09-15 | Stop reason: HOSPADM

## 2023-09-12 RX ORDER — PANTOPRAZOLE SODIUM 40 MG/1
40 TABLET, DELAYED RELEASE ORAL
Status: DISCONTINUED | OUTPATIENT
Start: 2023-09-13 | End: 2023-09-15 | Stop reason: HOSPADM

## 2023-09-12 RX ORDER — POTASSIUM CHLORIDE 20 MEQ/1
40 TABLET, EXTENDED RELEASE ORAL ONCE
Status: COMPLETED | OUTPATIENT
Start: 2023-09-12 | End: 2023-09-13

## 2023-09-12 RX ORDER — ACETAMINOPHEN 325 MG/1
650 TABLET ORAL EVERY 6 HOURS PRN
Status: DISCONTINUED | OUTPATIENT
Start: 2023-09-12 | End: 2023-09-15 | Stop reason: HOSPADM

## 2023-09-12 RX ADMIN — SODIUM CHLORIDE 1000 ML: 9 INJECTION, SOLUTION INTRAVENOUS at 21:20

## 2023-09-12 RX ADMIN — ACETAMINOPHEN 1000 MG: 500 TABLET ORAL at 18:28

## 2023-09-12 RX ADMIN — KETOROLAC TROMETHAMINE 15 MG: 30 INJECTION, SOLUTION INTRAMUSCULAR; INTRAVENOUS at 18:28

## 2023-09-12 RX ADMIN — SODIUM CHLORIDE 1000 ML: 9 INJECTION, SOLUTION INTRAVENOUS at 18:27

## 2023-09-12 RX ADMIN — IBUPROFEN 400 MG: 400 TABLET, FILM COATED ORAL at 21:55

## 2023-09-12 RX ADMIN — ONDANSETRON 4 MG: 2 INJECTION INTRAMUSCULAR; INTRAVENOUS at 18:28

## 2023-09-12 ASSESSMENT — PAIN DESCRIPTION - LOCATION: LOCATION: HEAD

## 2023-09-12 ASSESSMENT — PAIN SCALES - GENERAL
PAINLEVEL_OUTOF10: 8
PAINLEVEL_OUTOF10: 0
PAINLEVEL_OUTOF10: 5

## 2023-09-12 NOTE — ED NOTES
PT. Procalcitonin hemolyzed. Spoke with Dr. Miles Pandya. No orders to re-draw at this time.        Good Macario RN  09/12/23 8306

## 2023-09-13 ENCOUNTER — APPOINTMENT (OUTPATIENT)
Facility: HOSPITAL | Age: 68
End: 2023-09-13
Payer: MEDICARE

## 2023-09-13 LAB
ACCESSION NUMBER, LLC1M: ABNORMAL
ACINETOBACTER CALCOAC BAUMANNII COMPLEX BY PCR: NOT DETECTED
B FRAGILIS DNA BLD POS QL NAA+NON-PROBE: NOT DETECTED
BIOFIRE TEST COMMENT: ABNORMAL
BLACTX-M ISLT/SPM QL: NOT DETECTED
BLAIMP ISLT/SPM QL: NOT DETECTED
BLAKPC BLD POS QL NAA+NON-PROBE: NOT DETECTED
BLAOXA-48-LIKE ISLT/SPM QL: NOT DETECTED
BLAVIM ISLT/SPM QL: NOT DETECTED
C ALBICANS DNA BLD POS QL NAA+NON-PROBE: NOT DETECTED
C AURIS DNA BLD POS QL NAA+NON-PROBE: NOT DETECTED
C GATTII+NEOFOR DNA BLD POS QL NAA+N-PRB: NOT DETECTED
C GLABRATA DNA BLD POS QL NAA+NON-PROBE: NOT DETECTED
C KRUSEI DNA BLD POS QL NAA+NON-PROBE: NOT DETECTED
C PARAP DNA BLD POS QL NAA+NON-PROBE: NOT DETECTED
C TROPICLS DNA BLD POS QL NAA+NON-PROBE: NOT DETECTED
COLISTIN RES MCR-1 ISLT/SPM QL: NOT DETECTED
D DIMER PPP FEU-MCNC: 13.08 MG/L FEU (ref 0–0.65)
E CLOAC COMP DNA BLD POS NAA+NON-PROBE: NOT DETECTED
E COLI DNA BLD POS QL NAA+NON-PROBE: DETECTED
E FAECALIS DNA BLD POS QL NAA+NON-PROBE: NOT DETECTED
E FAECIUM DNA BLD POS QL NAA+NON-PROBE: NOT DETECTED
EKG ATRIAL RATE: 113 BPM
EKG DIAGNOSIS: NORMAL
EKG P AXIS: 72 DEGREES
EKG P-R INTERVAL: 166 MS
EKG Q-T INTERVAL: 336 MS
EKG QRS DURATION: 88 MS
EKG QTC CALCULATION (BAZETT): 460 MS
EKG R AXIS: -19 DEGREES
EKG T AXIS: 86 DEGREES
EKG VENTRICULAR RATE: 113 BPM
ENTEROBACTERALES DNA BLD POS NAA+N-PRB: DETECTED
FERRITIN SERPL-MCNC: 93 NG/ML (ref 26–388)
GP B STREP DNA BLD POS QL NAA+NON-PROBE: NOT DETECTED
HAEM INFLU DNA BLD POS QL NAA+NON-PROBE: NOT DETECTED
HAPTOGLOB SERPL-MCNC: 195 MG/DL (ref 30–200)
K OXYTOCA DNA BLD POS QL NAA+NON-PROBE: NOT DETECTED
KLEBSIELLA SP DNA BLD POS QL NAA+NON-PRB: NOT DETECTED
KLEBSIELLA SP DNA BLD POS QL NAA+NON-PRB: NOT DETECTED
L MONOCYTOG DNA BLD POS QL NAA+NON-PROBE: NOT DETECTED
LDH SERPL L TO P-CCNC: 244 U/L (ref 81–246)
N MEN DNA BLD POS QL NAA+NON-PROBE: NOT DETECTED
P AERUGINOSA DNA BLD POS NAA+NON-PROBE: NOT DETECTED
PROTEUS SP DNA BLD POS QL NAA+NON-PROBE: NOT DETECTED
RESISTANT GENE NDM BY PCR: NOT DETECTED
RESISTANT GENE TARGETS: ABNORMAL
S AUREUS DNA BLD POS QL NAA+NON-PROBE: NOT DETECTED
S AUREUS+CONS DNA BLD POS NAA+NON-PROBE: NOT DETECTED
S EPIDERMIDIS DNA BLD POS QL NAA+NON-PRB: NOT DETECTED
S LUGDUNENSIS DNA BLD POS QL NAA+NON-PRB: NOT DETECTED
S MALTOPHILIA DNA BLD POS QL NAA+NON-PRB: NOT DETECTED
S MARCESCENS DNA BLD POS NAA+NON-PROBE: NOT DETECTED
S PNEUM DNA BLD POS QL NAA+NON-PROBE: NOT DETECTED
S PYO DNA BLD POS QL NAA+NON-PROBE: NOT DETECTED
SALMONELLA DNA BLD POS QL NAA+NON-PROBE: NOT DETECTED
STREPTOCOCCUS DNA BLD POS NAA+NON-PROBE: NOT DETECTED

## 2023-09-13 PROCEDURE — 6360000004 HC RX CONTRAST MEDICATION: Performed by: STUDENT IN AN ORGANIZED HEALTH CARE EDUCATION/TRAINING PROGRAM

## 2023-09-13 PROCEDURE — 83615 LACTATE (LD) (LDH) ENZYME: CPT

## 2023-09-13 PROCEDURE — 2580000003 HC RX 258: Performed by: STUDENT IN AN ORGANIZED HEALTH CARE EDUCATION/TRAINING PROGRAM

## 2023-09-13 PROCEDURE — 1100000003 HC PRIVATE W/ TELEMETRY

## 2023-09-13 PROCEDURE — 6370000000 HC RX 637 (ALT 250 FOR IP): Performed by: STUDENT IN AN ORGANIZED HEALTH CARE EDUCATION/TRAINING PROGRAM

## 2023-09-13 PROCEDURE — 85379 FIBRIN DEGRADATION QUANT: CPT

## 2023-09-13 PROCEDURE — 71275 CT ANGIOGRAPHY CHEST: CPT

## 2023-09-13 PROCEDURE — 82728 ASSAY OF FERRITIN: CPT

## 2023-09-13 PROCEDURE — XW0DXM6 INTRODUCTION OF BARICITINIB INTO MOUTH AND PHARYNX, EXTERNAL APPROACH, NEW TECHNOLOGY GROUP 6: ICD-10-PCS | Performed by: STUDENT IN AN ORGANIZED HEALTH CARE EDUCATION/TRAINING PROGRAM

## 2023-09-13 PROCEDURE — 2500000003 HC RX 250 WO HCPCS: Performed by: STUDENT IN AN ORGANIZED HEALTH CARE EDUCATION/TRAINING PROGRAM

## 2023-09-13 PROCEDURE — 6360000002 HC RX W HCPCS: Performed by: STUDENT IN AN ORGANIZED HEALTH CARE EDUCATION/TRAINING PROGRAM

## 2023-09-13 PROCEDURE — 36415 COLL VENOUS BLD VENIPUNCTURE: CPT

## 2023-09-13 PROCEDURE — 83010 ASSAY OF HAPTOGLOBIN QUANT: CPT

## 2023-09-13 RX ORDER — LORAZEPAM 2 MG/ML
1 INJECTION INTRAMUSCULAR ONCE
Status: COMPLETED | OUTPATIENT
Start: 2023-09-13 | End: 2023-09-13

## 2023-09-13 RX ADMIN — PANTOPRAZOLE SODIUM 40 MG: 40 TABLET, DELAYED RELEASE ORAL at 08:55

## 2023-09-13 RX ADMIN — SODIUM CHLORIDE, PRESERVATIVE FREE 10 ML: 5 INJECTION INTRAVENOUS at 00:13

## 2023-09-13 RX ADMIN — TROSPIUM CHLORIDE 20 MG: 20 TABLET, FILM COATED ORAL at 08:51

## 2023-09-13 RX ADMIN — ENOXAPARIN SODIUM 60 MG: 100 INJECTION SUBCUTANEOUS at 13:18

## 2023-09-13 RX ADMIN — SODIUM CHLORIDE 1000 MG: 900 INJECTION INTRAVENOUS at 12:48

## 2023-09-13 RX ADMIN — POTASSIUM CHLORIDE 40 MEQ: 1500 TABLET, EXTENDED RELEASE ORAL at 00:10

## 2023-09-13 RX ADMIN — Medication 2000 UNITS: at 08:55

## 2023-09-13 RX ADMIN — DEXAMETHASONE 6 MG: 4 TABLET ORAL at 08:54

## 2023-09-13 RX ADMIN — SODIUM CHLORIDE, PRESERVATIVE FREE 10 ML: 5 INJECTION INTRAVENOUS at 08:55

## 2023-09-13 RX ADMIN — TROSPIUM CHLORIDE 20 MG: 20 TABLET, FILM COATED ORAL at 15:25

## 2023-09-13 RX ADMIN — BARICITINIB 4 MG: 2 TABLET, FILM COATED ORAL at 13:17

## 2023-09-13 RX ADMIN — LORAZEPAM 1 MG: 2 INJECTION INTRAMUSCULAR; INTRAVENOUS at 14:10

## 2023-09-13 RX ADMIN — ENOXAPARIN SODIUM 60 MG: 100 INJECTION SUBCUTANEOUS at 00:12

## 2023-09-13 RX ADMIN — ATORVASTATIN CALCIUM 40 MG: 40 TABLET, FILM COATED ORAL at 08:54

## 2023-09-13 RX ADMIN — DOXYCYCLINE 100 MG: 100 INJECTION, POWDER, LYOPHILIZED, FOR SOLUTION INTRAVENOUS at 13:19

## 2023-09-13 RX ADMIN — IOPAMIDOL 100 ML: 755 INJECTION, SOLUTION INTRAVENOUS at 15:03

## 2023-09-13 ASSESSMENT — PAIN SCALES - GENERAL
PAINLEVEL_OUTOF10: 3
PAINLEVEL_OUTOF10: 0

## 2023-09-13 ASSESSMENT — PAIN DESCRIPTION - LOCATION: LOCATION: HEAD

## 2023-09-13 NOTE — PROGRESS NOTES
TRANSFER - IN REPORT:    Verbal report received from Ki Menendez RN on [address]  being received from ER for routine progression of patient care      Report consisted of patient's Situation, Background, Assessment and   Recommendations(SBAR). Information from the following report(s) ED Encounter Summary was reviewed with the receiving nurse. Opportunity for questions and clarification was provided. Assessment completed upon patient's arrival to unit and care assumed.

## 2023-09-13 NOTE — ED PROVIDER NOTES
COLONOSCOPY N/A 2/9/2018    COLONOSCOPY performed by Kandy Ernst MD at [facility]  11/12/2014         [address]  2005    ALEX KNEE REPLACEMENT    AL UNLISTED PROCEDURE ABDOMEN PERITONEUM & OMENTUM      HERNIA    TONSILLECTOMY  AS CHILD    TUBAL LIGATION         Family History:  Family History   Problem Relation Age of Onset    Cancer Father     Hypertension Sister     Hypertension Brother     Diabetes Mother     Heart Disease Brother        Social History:  Social History     Tobacco Use    Smoking status: Never    Smokeless tobacco: Never   Vaping Use    Vaping Use: Never used   Substance Use Topics    Alcohol use: No    Drug use: No       Allergies: Allergies   Allergen Reactions    Adhesive Tape      Patient states not allergic       CURRENT MEDICATIONS      Previous Medications    ATORVASTATIN (LIPITOR) 40 MG TABLET    Take 1 tablet by mouth daily    BIOTIN 5 MG CAPS    Take 10 mg by mouth    CLOTRIMAZOLE-BETAMETHASONE (LOTRISONE) 1-0.05 % CREAM    Apply topically 2 times daily    DICLOFENAC (CATAFLAM) 50 MG TABLET    diclofenac potassium 50 mg tablet   Take 1 tablet twice a day by oral route. METOPROLOL SUCCINATE (TOPROL XL) 25 MG EXTENDED RELEASE TABLET    Take 1 tablet by mouth daily    MIRABEGRON (MYRBETRIQ) 25 MG TB24    Take 1 tablet by mouth daily    OMEGA-3 FATTY ACIDS (FISH OIL) 1000 MG CAPSULE    Fish Oil 300 mg-1,000 mg capsule   Take by oral route.     POTASSIUM CHLORIDE (MICRO-K) 10 MEQ EXTENDED RELEASE CAPSULE    TAKE 2 CAPSULES DAILY    RABEPRAZOLE (ACIPHEX) 20 MG TABLET    Take 1 tablet by mouth daily    TOLTERODINE (DETROL LA) 4 MG EXTENDED RELEASE CAPSULE    ceived the following from [facility]: Outside name: tolterodine ER (Detrol LA) 4 mg ER capsule    TOPIRAMATE (TOPAMAX) 25 MG TABLET    Take 1 tablet by mouth 2 times daily    VITAMIN D (CHOLECALCIFEROL) 25 MCG (1000 UT) TABS TABLET    Vitamin D3 25 Clinician of Record. Dena Dennison DO (electronically signed)    (Please note that parts of this dictation were completed with voice recognition software. Quite often unanticipated grammatical, syntax, homophones, and other interpretive errors are inadvertently transcribed by the computer software. Please disregards these errors.  Please excuse any errors that have escaped final proofreading.)          Guillermo Rodgers DO  09/12/23 2418

## 2023-09-13 NOTE — H&P
2.00 mmol/L    Source VENOUS BLOOD     EKG 12 Lead    Collection Time: 09/12/23  6:36 PM   Result Value Ref Range    Ventricular Rate 113 BPM    Atrial Rate 113 BPM    P-R Interval 166 ms    QRS Duration 88 ms    Q-T Interval 336 ms    QTc Calculation (Bazett) 460 ms    P Axis 72 degrees    R Axis -19 degrees    T Axis 86 degrees    Diagnosis       Sinus tachycardia  Biatrial enlargement  Left ventricular hypertrophy  Nonspecific T wave abnormality  When compared with ECG of 25-FEB-2016 09:24,  Nonspecific T wave abnormality now evident in Lateral leads     Comprehensive Metabolic Panel    Collection Time: 09/12/23  8:05 PM   Result Value Ref Range    Sodium 141 136 - 145 mmol/L    Potassium 3.3 (L) 3.5 - 5.1 mmol/L    Chloride 110 (H) 97 - 108 mmol/L    CO2 24 21 - 32 mmol/L    Anion Gap 7 5 - 15 mmol/L    Glucose 96 65 - 100 mg/dL    BUN 15 6 - 20 MG/DL    Creatinine 0.75 0.55 - 1.02 MG/DL    Bun/Cre Ratio 20 12 - 20      Est, Glom Filt Rate >60 >60 ml/min/1.73m2    Calcium 8.4 (L) 8.5 - 10.1 MG/DL    Total Bilirubin 1.7 (H) 0.2 - 1.0 MG/DL    ALT 14 12 - 78 U/L    AST 12 (L) 15 - 37 U/L    Alk Phosphatase 109 45 - 117 U/L    Total Protein 6.7 6.4 - 8.2 g/dL    Albumin 3.1 (L) 3.5 - 5.0 g/dL    Globulin 3.6 2.0 - 4.0 g/dL    Albumin/Globulin Ratio 0.9 (L) 1.1 - 2.2     Procalcitonin    Collection Time: 09/12/23  8:05 PM   Result Value Ref Range    Procalcitonin 9.77 ng/mL         CT Result (most recent):  CT ABDOMEN PELVIS W IV CONTRAST 12/28/2017    Narrative  EXAM:  CT ABD PELV W CONT    INDICATION: right lower quadrant pain. Symptoms present for several years,  increasing in severity. COMPARISON: 5/11/2015    CONTRAST:  100 mL of Isovue-370. TECHNIQUE:  Following the uneventful intravenous administration of contrast, thin axial  images were obtained through the abdomen and pelvis. Coronal and sagittal  reconstructions were generated. Oral contrast was not administered.  CT dose  reduction was achieved through use of a standardized protocol tailored for this  examination and automatic exposure control for dose modulation. FINDINGS:  LUNG BASES: Clear. INCIDENTALLY IMAGED HEART AND MEDIASTINUM: Coronary artery calcification. Small  hiatal hernia. LIVER: No mass or biliary dilatation. GALLBLADDER: Unremarkable. SPLEEN: No mass. PANCREAS: No mass or ductal dilatation. ADRENALS: Unremarkable. KIDNEYS: No mass, calculus, or hydronephrosis. STOMACH: Unremarkable. SMALL BOWEL: No dilatation or wall thickening. COLON: No dilatation or wall thickening. APPENDIX: Not visualized. Morbid obesity. [facility] PERITONEUM: No ascites or pneumoperitoneum. RETROPERITONEUM: No lymphadenopathy or aortic aneurysm. REPRODUCTIVE ORGANS: Small uterus  URINARY BLADDER: No mass or calculus. BONES: No destructive bone lesion. Mild diastases of the symphysis. Vacuum  phenomena of both sacroiliac joints. Vacuum phenomena at all lumbar levels. ADDITIONAL COMMENTS: N/A    Impression  IMPRESSION:  No acute process identified as best assessed in a patient of this body habitus. Morbid obesity. Marked lumbar spondylosis        Xray Result (most recent):  XR CHEST PORTABLE 09/12/2023    Narrative  INDICATION:  Sepsis    Exam: Portable chest 1810. Comparison: None. Findings: Cardiomediastinal silhouette is within normal limits. Pulmonary  vasculature is not engorged. There are no focal parenchymal opacities,  effusions, or pneumothorax. Impression  1. No acute disease        _______________________________________________________________________    TOTAL TIME:  75 Minutes   TOBACCO CESSATION COUNSELING: No    Critical Care Provided  N/A  (Minutes non procedure based)        [address] [place] [address] - Internal Medicine Hospitalist/ Nocturnist  9/12/2023 10:46 PM    Please do not hesitate to reach out to myself or assigned provider on-call via [facility] paging system with questions or concerns.      Procedures: see

## 2023-09-13 NOTE — CARE COORDINATION
Care Management Initial Assessment       RUR:10%  Readmission? No  1st IM letter given? no  1st  letter given: No     09/13/23 1438   Service Assessment   Patient Orientation Other (see comment)   Cognition Other (see comment)   History Provided By [facility]   Primary Caregiver Self   Support Systems Spouse/Significant Other   Patient's Healthcare Decision Maker is: Patient Declined (Legal Next of Kin Remains as Decision Maker)  (If patient decides she wants to do one, she knows she can ask for assistance)   PCP Verified by CM Yes   Prior Functional Level Independent in ADLs/IADLs   Current Functional Level Independent in ADLs/IADLs   Can patient return to prior living arrangement Yes   Family able to assist with home care needs: Yes   Financial Resources Medicare   Social/Functional History   Lives With Spouse   Type of [address] Access   (5 steps)   Home Equipment Cane;Walker, standard   Receives Help From Family   ADL Assistance Independent   Homemaking Assistance Independent   Ambulation Assistance Independent   Transfer Assistance Independent   Discharge Planning   Type of Residence   (outpatient PT)   Type of [id number] PT   Patient expects to be discharged to: House     Patient is on isolation. I spoke with patient's  on the phone. Verified demographics    Patient was independent at home, uses a cane, has a walker, is getting outpatient PT but   did not know the company name. She lives in a 1 story home with 5 steps to enter, lives with her   She is not a . CM will follow for Discharge needs.     Derrick Wilcox  [facility]

## 2023-09-13 NOTE — ED NOTES
Pt refused CT scan in CT because she states she is claustrophobic.       Abhijit Malagon, TI  09/13/23 1721

## 2023-09-13 NOTE — PROGRESS NOTES
Pharmacy consulted for baricitinib vs tocilizumab for COVID treatment. D dimer is elevated and patient is on 3L NC so qualifies for baricitinib.  Ordered baricitinib 4 mg daily for 14 days

## 2023-09-13 NOTE — PROGRESS NOTES
Hospitalist Progress Note    NAME:   Heber Ware   : 1955   MRN: [id number]     Date/Time: 2023 11:27 AM  Patient PCP: Alex Nunez MD    Estimated discharge date: >48 hrs  Barriers: Medical stability      Assessment / Plan:    COVID-19  Acute hypoxemic respiratory failure-unclear if secondary to COVID or obesity hypoventilation  Obesity class III by BMI 67.06  Suspect underlying RAJANI/OHS  , Ferritin 93, Haptoglobin 195, D-Dimer 13.08. Procal 9.77  Plan  Droplet plus precautions  Dexamethasone 6 mg daily x10 days  Obtain CTA chest to better evaluate etiology of acute hypoxemic respiratory failure-ativan for claustrophobia ordered  Incentive spirometry  DuoNebs as needed  If evidence of consolidation on CTA chest will initiate empiric CAP coverage respiratory   Check inflammatory markers-if profoundly elevated could consider immunomodulators-baricitinib versus tocilizumab  Code status and respiratory preferences discussed on admission with patient who would like to be full code and would be amenable to both BiPAP and intubation if deemed necessary.   -prior to CT returning 1/2 blood cultures with GNR, adding ceftriaxone and doxycycline for CAP coverage     CAD  Essential hypertension  Hyperlipidemia  Continue PTA atorvastatin  Hold PTA metoprolol in setting of borderline hypotension     Mild hypokalemia  Replacement ordered  Trend BMP with reflex magnesium     Chronic pain  Hold PTA diclofenac-starting steroids  As needed Tylenol  Escalate regimen as needed     Vitamin D deficiency  Continue PTA vitamin D supplementation     Overactive bladder  Formulary substitution trospium     Mild hyperbilirubinemia of unclear significance-likely secondary to acute illness  Trend hepatic function panel     Medical Decision Making:   I personally reviewed labs: CBC, BMP, Hepatic panel, inflammatory markers  I personally reviewed imaging: CXR no acute changes by my interpretation-agree with overnight rads ________________________________________________________________________  Pam Monson MD     Procedures: see electronic medical records for all procedures/Xrays and details which were not copied into this note but were reviewed prior to creation of Plan. LABS:  I reviewed today's most current labs and imaging studies.   Pertinent labs include:  Recent Labs     09/12/23  1752   WBC 8.5   HGB 11.7   HCT 37.0        Recent Labs     09/12/23  1820 09/12/23 2005   NA  --  141   K  --  3.3*   CL  --  110*   CO2  --  24   GLUCOSE  --  96   BUN  --  15   CREATININE 0.6 0.75   CALCIUM  --  8.4*   LABALBU  --  3.1*   BILITOT  --  1.7*   AST  --  12*   ALT  --  14       Signed: Pam Monson MD

## 2023-09-14 LAB
ALBUMIN SERPL-MCNC: 3.4 G/DL (ref 3.5–5)
ALBUMIN/GLOB SERPL: 0.7 (ref 1.1–2.2)
ALP SERPL-CCNC: 127 U/L (ref 45–117)
ALT SERPL-CCNC: 16 U/L (ref 12–78)
ANION GAP SERPL CALC-SCNC: 4 MMOL/L (ref 5–15)
AST SERPL-CCNC: 12 U/L (ref 15–37)
BASOPHILS # BLD: 0 K/UL (ref 0–0.1)
BASOPHILS NFR BLD: 0 % (ref 0–1)
BILIRUB DIRECT SERPL-MCNC: 0.2 MG/DL (ref 0–0.2)
BILIRUB SERPL-MCNC: 1.2 MG/DL (ref 0.2–1)
BUN SERPL-MCNC: 12 MG/DL (ref 6–20)
BUN/CREAT SERPL: 15 (ref 12–20)
CALCIUM SERPL-MCNC: 9.6 MG/DL (ref 8.5–10.1)
CHLORIDE SERPL-SCNC: 108 MMOL/L (ref 97–108)
CO2 SERPL-SCNC: 27 MMOL/L (ref 21–32)
CREAT SERPL-MCNC: 0.81 MG/DL (ref 0.55–1.02)
DIFFERENTIAL METHOD BLD: ABNORMAL
EOSINOPHIL # BLD: 0 K/UL (ref 0–0.4)
EOSINOPHIL NFR BLD: 0 % (ref 0–7)
ERYTHROCYTE [DISTWIDTH] IN BLOOD BY AUTOMATED COUNT: 14.4 % (ref 11.5–14.5)
GLOBULIN SER CALC-MCNC: 5 G/DL (ref 2–4)
GLUCOSE SERPL-MCNC: 122 MG/DL (ref 65–100)
HCT VFR BLD AUTO: 37.7 % (ref 35–47)
HGB BLD-MCNC: 11.7 G/DL (ref 11.5–16)
IMM GRANULOCYTES # BLD AUTO: 0 K/UL (ref 0–0.04)
IMM GRANULOCYTES NFR BLD AUTO: 0 % (ref 0–0.5)
LYMPHOCYTES # BLD: 0.9 K/UL (ref 0.8–3.5)
LYMPHOCYTES NFR BLD: 8 % (ref 12–49)
MCH RBC QN AUTO: 27.9 PG (ref 26–34)
MCHC RBC AUTO-ENTMCNC: 31 G/DL (ref 30–36.5)
MCV RBC AUTO: 90 FL (ref 80–99)
MONOCYTES # BLD: 0.7 K/UL (ref 0–1)
MONOCYTES NFR BLD: 6 % (ref 5–13)
NEUTS SEG # BLD: 8.9 K/UL (ref 1.8–8)
NEUTS SEG NFR BLD: 86 % (ref 32–75)
NRBC # BLD: 0 K/UL (ref 0–0.01)
NRBC BLD-RTO: 0 PER 100 WBC
PLATELET # BLD AUTO: 160 K/UL (ref 150–400)
PMV BLD AUTO: 12.3 FL (ref 8.9–12.9)
POTASSIUM SERPL-SCNC: 3.6 MMOL/L (ref 3.5–5.1)
PROT SERPL-MCNC: 8.4 G/DL (ref 6.4–8.2)
RBC # BLD AUTO: 4.19 M/UL (ref 3.8–5.2)
SODIUM SERPL-SCNC: 139 MMOL/L (ref 136–145)
WBC # BLD AUTO: 10.5 K/UL (ref 3.6–11)

## 2023-09-14 PROCEDURE — 97165 OT EVAL LOW COMPLEX 30 MIN: CPT | Performed by: OCCUPATIONAL THERAPIST

## 2023-09-14 PROCEDURE — 2500000003 HC RX 250 WO HCPCS: Performed by: STUDENT IN AN ORGANIZED HEALTH CARE EDUCATION/TRAINING PROGRAM

## 2023-09-14 PROCEDURE — 36415 COLL VENOUS BLD VENIPUNCTURE: CPT

## 2023-09-14 PROCEDURE — 2700000000 HC OXYGEN THERAPY PER DAY

## 2023-09-14 PROCEDURE — 6360000002 HC RX W HCPCS: Performed by: STUDENT IN AN ORGANIZED HEALTH CARE EDUCATION/TRAINING PROGRAM

## 2023-09-14 PROCEDURE — 6370000000 HC RX 637 (ALT 250 FOR IP): Performed by: STUDENT IN AN ORGANIZED HEALTH CARE EDUCATION/TRAINING PROGRAM

## 2023-09-14 PROCEDURE — 2580000003 HC RX 258: Performed by: STUDENT IN AN ORGANIZED HEALTH CARE EDUCATION/TRAINING PROGRAM

## 2023-09-14 PROCEDURE — 80076 HEPATIC FUNCTION PANEL: CPT

## 2023-09-14 PROCEDURE — 97116 GAIT TRAINING THERAPY: CPT

## 2023-09-14 PROCEDURE — 97161 PT EVAL LOW COMPLEX 20 MIN: CPT

## 2023-09-14 PROCEDURE — 1100000003 HC PRIVATE W/ TELEMETRY

## 2023-09-14 PROCEDURE — 94760 N-INVAS EAR/PLS OXIMETRY 1: CPT

## 2023-09-14 PROCEDURE — 85025 COMPLETE CBC W/AUTO DIFF WBC: CPT

## 2023-09-14 PROCEDURE — 97535 SELF CARE MNGMENT TRAINING: CPT | Performed by: OCCUPATIONAL THERAPIST

## 2023-09-14 PROCEDURE — 80048 BASIC METABOLIC PNL TOTAL CA: CPT

## 2023-09-14 RX ADMIN — TROSPIUM CHLORIDE 20 MG: 20 TABLET, FILM COATED ORAL at 17:58

## 2023-09-14 RX ADMIN — BARICITINIB 4 MG: 2 TABLET, FILM COATED ORAL at 09:14

## 2023-09-14 RX ADMIN — SODIUM CHLORIDE, PRESERVATIVE FREE 10 ML: 5 INJECTION INTRAVENOUS at 00:37

## 2023-09-14 RX ADMIN — SODIUM CHLORIDE, PRESERVATIVE FREE 10 ML: 5 INJECTION INTRAVENOUS at 09:18

## 2023-09-14 RX ADMIN — DOXYCYCLINE 100 MG: 100 INJECTION, POWDER, LYOPHILIZED, FOR SOLUTION INTRAVENOUS at 17:58

## 2023-09-14 RX ADMIN — DOXYCYCLINE 100 MG: 100 INJECTION, POWDER, LYOPHILIZED, FOR SOLUTION INTRAVENOUS at 00:36

## 2023-09-14 RX ADMIN — ACETAMINOPHEN 650 MG: 325 TABLET ORAL at 17:58

## 2023-09-14 RX ADMIN — SODIUM CHLORIDE 1000 MG: 900 INJECTION INTRAVENOUS at 15:12

## 2023-09-14 RX ADMIN — DEXAMETHASONE 6 MG: 4 TABLET ORAL at 10:21

## 2023-09-14 RX ADMIN — TROSPIUM CHLORIDE 20 MG: 20 TABLET, FILM COATED ORAL at 09:14

## 2023-09-14 RX ADMIN — ENOXAPARIN SODIUM 60 MG: 100 INJECTION SUBCUTANEOUS at 22:07

## 2023-09-14 RX ADMIN — ATORVASTATIN CALCIUM 40 MG: 40 TABLET, FILM COATED ORAL at 09:14

## 2023-09-14 RX ADMIN — SODIUM CHLORIDE, PRESERVATIVE FREE 10 ML: 5 INJECTION INTRAVENOUS at 22:07

## 2023-09-14 RX ADMIN — ENOXAPARIN SODIUM 60 MG: 100 INJECTION SUBCUTANEOUS at 12:26

## 2023-09-14 RX ADMIN — PANTOPRAZOLE SODIUM 40 MG: 40 TABLET, DELAYED RELEASE ORAL at 09:14

## 2023-09-14 RX ADMIN — Medication 2000 UNITS: at 09:14

## 2023-09-14 RX ADMIN — ENOXAPARIN SODIUM 60 MG: 100 INJECTION SUBCUTANEOUS at 00:34

## 2023-09-14 ASSESSMENT — PAIN SCALES - GENERAL
PAINLEVEL_OUTOF10: 0
PAINLEVEL_OUTOF10: 3

## 2023-09-14 ASSESSMENT — PAIN DESCRIPTION - LOCATION: LOCATION: HEAD

## 2023-09-14 ASSESSMENT — PAIN DESCRIPTION - ORIENTATION: ORIENTATION: POSTERIOR

## 2023-09-14 ASSESSMENT — PAIN DESCRIPTION - DESCRIPTORS: DESCRIPTORS: ACHING

## 2023-09-14 NOTE — PLAN OF CARE
Problem: Physical Therapy - Adult  Goal: By Discharge: Performs mobility at highest level of function for planned discharge setting. See evaluation for individualized goals. Description: FUNCTIONAL STATUS PRIOR TO ADMISSION: Patient was modified independent using a single point cane for functional mobility. HOME SUPPORT PRIOR TO ADMISSION: The patient lived with  but provides assistance to him as he has heart failure. Physical Therapy Goals  Initiated 9/14/2023  1. Patient will move from supine to sit and sit to supine in bed with modified independence within 7 day(s). 2.  Patient will perform sit to stand with modified independence within 7 day(s). 3.  Patient will transfer from bed to chair and chair to bed with modified independence using the least restrictive device within 7 day(s). 4.  Patient will ambulate with modified independence for 100 feet with the least restrictive device within 7 day(s). 5.  Patient will ascend/descend 5 stairs with B handrail(s) with supervision/set-up within 7 day(s). Outcome: Progressing     PHYSICAL THERAPY EVALUATION    Patient: Amy Kelly (1 y.o. female)  Date: 9/14/2023  Primary Diagnosis: Tachycardia [R00.0]  Acute respiratory failure with hypoxia ([address]) [J96.01]  COVID-19 [U07.1]       Precautions: Other (comment) (fall risk)                    ASSESSMENT :   DEFICITS/IMPAIRMENTS:   The patient is limited by decreased functional mobility, strength, activity tolerance, endurance, safety awareness, balance. Patient is a 69 y/o female who was admitted to the hospital due to fever and now is being treated for COVID-19 and acute hypoxic respiratory failure. Patient educated on the purpose and benefits of skilled PT and goals to be addressed with pt verbalizing good understanding. Patient received laying in bed and agreeable to activity. Pt directed in supine to sit with CGA. Pt then engaged in transfer from sit to stand with CGA.  Pt ambulated in room Phys Ther. 2021 Apr 4;101(4):uwod931. doi: 10.1093/ptj/dipu000. PMID: 31834942. [facility],[address] Floor, Roxy NEGRON, Arleth Mcgovern, Jonh FREEMAN. Activity Measure for Post-Acute Care \"6-Clicks\" Basic Mobility Scores Predict Discharge Destination After Acute Care Hospitalization in Select Patient Groups: A Retrospective, Observational Study. Arch Rehabil Res Clin Transl. 2022 Jul 16;4(3):996248. doi: 10.1016/j.arrct. 4101.109528. PMID: 44846274; PMCID: SES9248301. 4. Reagan Veras, Wil W, Dai DAVIS. AM-PAC Short Forms Manual 4.0. Revised 2/2020.                                                                                                                                                                                                                                Pain Rating:  Did not rate pain but reports discomfort in R knee when transitioning from sit to stand  Pain Intervention(s):   rest    Activity Tolerance:   Fair     After treatment:   Patient left in no apparent distress sitting up in chair, Call bell within reach, and Bed/ chair alarm activated    COMMUNICATION/EDUCATION:   The patient's plan of care was discussed with: occupational therapist and registered nurse    Patient Education  Education Given To: Patient  Education Provided: Role of Therapy;Plan of Care;Transfer Training  Education Method: Demonstration;Verbal  Barriers to Learning: None  Education Outcome: Verbalized understanding    Thank you for this referral.  Moe Gallegos PT  Minutes: 24      Physical Therapy Evaluation Charge Determination   History Examination Presentation Decision-Making   LOW Complexity : Zero comorbidities / personal factors that will impact the outcome / POC LOW Complexity : 1-2 Standardized tests and measures addressing body structure, function, activity limitation and / or participation in recreation  LOW Complexity : Stable, uncomplicated  AM-PAC  LOW    Based on the above components, the patient evaluation is

## 2023-09-14 NOTE — PROGRESS NOTES
Eval completed and note to follow. Recommend HHOT at discharge. Pt has all needed DME for discharge and OT will need to assess for shower chair in the home.

## 2023-09-14 NOTE — PROGRESS NOTES
Hospitalist Progress Note    NAME:   Emanuel Briggs   : 1955   MRN: [id number]     Date/Time: 2023 8:48 AM  Patient PCP: Yves Green MD    Estimated discharge date: tomorrow   Barriers: Culture      Assessment / Plan:    COVID-19  Acute hypoxemic respiratory failure-unclear if secondary to COVID or obesity hypoventilation  Obesity class III by BMI 67.06  Suspect underlying RAJANI/OHS  , Ferritin 93, Haptoglobin 195, D-Dimer 13.08. Procal 9.77  Plan  Droplet plus precautions  Dexamethasone 6 mg daily x10 days  Obtain CTA chest to better evaluate etiology of acute hypoxemic respiratory failure-ativan for claustrophobia ordered  Incentive spirometry  DuoNebs as needed  If evidence of consolidation on CTA chest will initiate empiric CAP coverage respiratory   Check inflammatory markers-if profoundly elevated could consider immunomodulators-baricitinib versus tocilizumab  Code status and respiratory preferences discussed on admission with patient who would like to be full code and would be amenable to both BiPAP and intubation if deemed necessary.   -prior to CT returning 1/2 blood cultures with GNR, adding ceftriaxone and doxycycline for CAP coverage  Repeat blood cultures negative so far     CAD  Essential hypertension  Hyperlipidemia  Continue PTA atorvastatin  Hold PTA metoprolol in setting of borderline hypotension     Mild hypokalemia  Replacement ordered  Trend BMP with reflex magnesium     Chronic pain  Hold PTA diclofenac-starting steroids  As needed Tylenol  Escalate regimen as needed     Vitamin D deficiency  Continue PTA vitamin D supplementation     Overactive bladder  Formulary substitution trospium     Mild hyperbilirubinemia of unclear significance-likely secondary to acute illness  Trend hepatic function panel     Medical Decision Making:   I personally reviewed labs: CBC, BMP, Hepatic panel, inflammatory markers  I personally reviewed imaging: CXR no acute changes by my

## 2023-09-15 ENCOUNTER — TELEPHONE (OUTPATIENT)
Age: 68
End: 2023-09-15

## 2023-09-15 VITALS
TEMPERATURE: 99 F | DIASTOLIC BLOOD PRESSURE: 83 MMHG | WEIGHT: 293 LBS | BODY MASS INDEX: 48.82 KG/M2 | HEART RATE: 90 BPM | OXYGEN SATURATION: 99 % | RESPIRATION RATE: 16 BRPM | HEIGHT: 65 IN | SYSTOLIC BLOOD PRESSURE: 150 MMHG

## 2023-09-15 LAB
ALBUMIN SERPL-MCNC: 2.8 G/DL (ref 3.5–5)
ALBUMIN/GLOB SERPL: 0.6 (ref 1.1–2.2)
ALP SERPL-CCNC: 100 U/L (ref 45–117)
ALT SERPL-CCNC: 16 U/L (ref 12–78)
ANION GAP SERPL CALC-SCNC: 4 MMOL/L (ref 5–15)
AST SERPL-CCNC: 9 U/L (ref 15–37)
BACTERIA SPEC CULT: ABNORMAL
BACTERIA SPEC CULT: ABNORMAL
BILIRUB SERPL-MCNC: 0.5 MG/DL (ref 0.2–1)
BUN SERPL-MCNC: 12 MG/DL (ref 6–20)
BUN/CREAT SERPL: 16 (ref 12–20)
CALCIUM SERPL-MCNC: 9.1 MG/DL (ref 8.5–10.1)
CHLORIDE SERPL-SCNC: 110 MMOL/L (ref 97–108)
CO2 SERPL-SCNC: 27 MMOL/L (ref 21–32)
CREAT SERPL-MCNC: 0.75 MG/DL (ref 0.55–1.02)
ERYTHROCYTE [DISTWIDTH] IN BLOOD BY AUTOMATED COUNT: 14.2 % (ref 11.5–14.5)
GLOBULIN SER CALC-MCNC: 4.4 G/DL (ref 2–4)
GLUCOSE SERPL-MCNC: 89 MG/DL (ref 65–100)
HCT VFR BLD AUTO: 34.8 % (ref 35–47)
HGB BLD-MCNC: 10.6 G/DL (ref 11.5–16)
MCH RBC QN AUTO: 27.5 PG (ref 26–34)
MCHC RBC AUTO-ENTMCNC: 30.5 G/DL (ref 30–36.5)
MCV RBC AUTO: 90.4 FL (ref 80–99)
NRBC # BLD: 0 K/UL (ref 0–0.01)
NRBC BLD-RTO: 0 PER 100 WBC
PLATELET # BLD AUTO: 155 K/UL (ref 150–400)
PMV BLD AUTO: 12.4 FL (ref 8.9–12.9)
POTASSIUM SERPL-SCNC: 3.6 MMOL/L (ref 3.5–5.1)
PROT SERPL-MCNC: 7.2 G/DL (ref 6.4–8.2)
RBC # BLD AUTO: 3.85 M/UL (ref 3.8–5.2)
SERVICE CMNT-IMP: ABNORMAL
SODIUM SERPL-SCNC: 141 MMOL/L (ref 136–145)
WBC # BLD AUTO: 5.7 K/UL (ref 3.6–11)

## 2023-09-15 PROCEDURE — 2580000003 HC RX 258: Performed by: STUDENT IN AN ORGANIZED HEALTH CARE EDUCATION/TRAINING PROGRAM

## 2023-09-15 PROCEDURE — 36415 COLL VENOUS BLD VENIPUNCTURE: CPT

## 2023-09-15 PROCEDURE — 6360000002 HC RX W HCPCS: Performed by: STUDENT IN AN ORGANIZED HEALTH CARE EDUCATION/TRAINING PROGRAM

## 2023-09-15 PROCEDURE — 6370000000 HC RX 637 (ALT 250 FOR IP): Performed by: STUDENT IN AN ORGANIZED HEALTH CARE EDUCATION/TRAINING PROGRAM

## 2023-09-15 PROCEDURE — 80053 COMPREHEN METABOLIC PANEL: CPT

## 2023-09-15 PROCEDURE — 2500000003 HC RX 250 WO HCPCS: Performed by: STUDENT IN AN ORGANIZED HEALTH CARE EDUCATION/TRAINING PROGRAM

## 2023-09-15 PROCEDURE — 85027 COMPLETE CBC AUTOMATED: CPT

## 2023-09-15 RX ORDER — GUAIFENESIN/DEXTROMETHORPHAN 100-10MG/5
5 SYRUP ORAL 3 TIMES DAILY PRN
Qty: 1 EACH | Refills: 0 | Status: SHIPPED | OUTPATIENT
Start: 2023-09-15 | End: 2023-09-18

## 2023-09-15 RX ORDER — CEFDINIR 300 MG/1
300 CAPSULE ORAL 2 TIMES DAILY
Qty: 20 CAPSULE | Refills: 0 | Status: SHIPPED | OUTPATIENT
Start: 2023-09-15 | End: 2023-09-25

## 2023-09-15 RX ORDER — CEFDINIR 300 MG/1
300 CAPSULE ORAL 2 TIMES DAILY
Qty: 20 CAPSULE | Refills: 0 | Status: SHIPPED | OUTPATIENT
Start: 2023-09-15 | End: 2023-09-15 | Stop reason: SDUPTHER

## 2023-09-15 RX ORDER — GUAIFENESIN AND CODEINE PHOSPHATE 100; 10 MG/5ML; MG/5ML
5 SOLUTION ORAL 2 TIMES DAILY PRN
Qty: 1 EACH | Refills: 0 | Status: SHIPPED | OUTPATIENT
Start: 2023-09-15 | End: 2023-09-15 | Stop reason: HOSPADM

## 2023-09-15 RX ORDER — GUAIFENESIN AND CODEINE PHOSPHATE 100; 10 MG/5ML; MG/5ML
5 SOLUTION ORAL 2 TIMES DAILY PRN
Qty: 1 EACH | Refills: 0 | Status: SHIPPED | OUTPATIENT
Start: 2023-09-15 | End: 2023-09-15 | Stop reason: SDUPTHER

## 2023-09-15 RX ADMIN — PANTOPRAZOLE SODIUM 40 MG: 40 TABLET, DELAYED RELEASE ORAL at 08:19

## 2023-09-15 RX ADMIN — BARICITINIB 4 MG: 2 TABLET, FILM COATED ORAL at 10:14

## 2023-09-15 RX ADMIN — ENOXAPARIN SODIUM 60 MG: 100 INJECTION SUBCUTANEOUS at 10:26

## 2023-09-15 RX ADMIN — TROSPIUM CHLORIDE 20 MG: 20 TABLET, FILM COATED ORAL at 09:00

## 2023-09-15 RX ADMIN — Medication 1 LOZENGE: at 08:26

## 2023-09-15 RX ADMIN — ATORVASTATIN CALCIUM 40 MG: 40 TABLET, FILM COATED ORAL at 08:19

## 2023-09-15 RX ADMIN — DOXYCYCLINE 100 MG: 100 INJECTION, POWDER, LYOPHILIZED, FOR SOLUTION INTRAVENOUS at 04:02

## 2023-09-15 RX ADMIN — Medication 2000 UNITS: at 08:19

## 2023-09-15 RX ADMIN — SODIUM CHLORIDE, PRESERVATIVE FREE 10 ML: 5 INJECTION INTRAVENOUS at 08:20

## 2023-09-15 RX ADMIN — DEXAMETHASONE 6 MG: 4 TABLET ORAL at 09:00

## 2023-09-15 ASSESSMENT — PAIN SCALES - GENERAL
PAINLEVEL_OUTOF10: 0
PAINLEVEL_OUTOF10: 0

## 2023-09-15 NOTE — CARE COORDINATION
Transition of Care Plan:    RUR: 14%  Prior Level of Functioning: Independent  [facility]  If SNF or IPR: Date FOC offered: N/A  Date FOC received: N/A  Accepting facility: N/A  Date authorization started with reference number: N/A  Date authorization received and expires: N/A  Follow up appointments: PCP  DME needed: N/A  Transportation at discharge: Pt's spouse to transport  IM/IMM Medicare/ letter given:   Is patient a [place] and connected with [facility]? N/A   If yes, was [employer] transfer form completed and [facility] notified? N/A  Caregiver Contact: Manuela Kitchen  Discharge Caregiver contacted prior to discharge? Pt was contacted  Care Conference needed? No  Barriers to discharge: N/A    Pt is clear for d/c from a CM standpoint. 09/15/23 3101 Abner Villarreal Discharge   Transition of Care Consult (CM Consult) [address] Resource Information Provided? No   Mode of Transport at Discharge Self   Confirm Follow Up Transport Self   Condition of Participation: Discharge Planning   The Patient and/or Patient Representative was provided with a Choice of Provider? Patient   The Patient and/Or Patient Representative agree with the Discharge Plan? Yes   Freedom of Choice list was provided with basic dialogue that supports the patient's individualized plan of care/goals, treatment preferences, and shares the quality data associated with the providers?   Yes     Lance Carlin

## 2023-09-15 NOTE — TELEPHONE ENCOUNTER
Mireya from [facility] called,  patient is being discharged today from the hospital    they need to know if Dr Sri Pabon will follow her for home health services    Please call  [phone]      Can leave a message    They would like to be able to see her this weekend

## 2023-09-15 NOTE — PROGRESS NOTES
Physician Progress Note      PATIENT:               Diogenes Berrios  CSN #:                  [id number]  :                       1955  ADMIT DATE:       2023 5:40 PM  [address] [facility] DATE:        9/15/2023 5:04 PM  RESPONDING  PROVIDER #:        Starla Moser MD          QUERY TEXT:    Pt admitted with COVID 19. Pt noted to T 102.7 100.8 P 122 113 112 111 114 107   100 RR 26 24 23 20, Procal 9.77, COVID 19. If possible, please document in   the progress notes and discharge summary if you are evaluating and /or   treating any of the following: The medical record reflects the following:  Risk Factors: COVID 19    Clinical Indicators:  - Procal 9.77   T- 102.7 100.8  - P 122 113 112 111 114 107 100  - RR 26 24 23 20    Treatment: IV Fluids, IV doxycycline, rocephin    Thank you,  Sanjeev Mariee RN, BSN, CRCR, CCDS, SMART  Clinical Documentation Improvement  Kate [email or web address]. [email or web address]  [phone] or via Perfect [employer]  Options provided:  -- Sepsis due to COVID 19 infection, present on admission  -- COVID 19 infection without Sepsis  -- Other - I will add my own diagnosis  -- Disagree - Not applicable / Not valid  -- Disagree - Clinically unable to determine / Unknown  -- Refer to Clinical Documentation Reviewer    PROVIDER RESPONSE TEXT:    This patient has Sepsis due to COVID 19 infection which was present on   admission. Query created by: Sanjeev Mariee on 2023 1:09 PM      Electronically signed by:   Starla Moser MD 9/15/2023 5:40 PM

## 2023-09-15 NOTE — PROGRESS NOTES
[facility]UAL [facility] hospital follow-up transitional care appointment has been scheduled with Dr. Lee Ann Garcia on 9/19/23 at [address]. Pending patient discharge.  Salvador Sainz, Care Management Assistant

## 2023-09-17 LAB
BACTERIA SPEC CULT: NORMAL
SERVICE CMNT-IMP: NORMAL

## 2023-09-19 ENCOUNTER — TELEMEDICINE (OUTPATIENT)
Age: 68
End: 2023-09-19

## 2023-09-19 DIAGNOSIS — Z09 HOSPITAL DISCHARGE FOLLOW-UP: Primary | ICD-10-CM

## 2023-09-19 DIAGNOSIS — R09.02 HYPOXIA: ICD-10-CM

## 2023-09-19 DIAGNOSIS — J98.8 RESPIRATORY TRACT INFECTION DUE TO COVID-19 VIRUS: ICD-10-CM

## 2023-09-19 DIAGNOSIS — U07.1 RESPIRATORY TRACT INFECTION DUE TO COVID-19 VIRUS: ICD-10-CM

## 2023-09-19 LAB
BACTERIA SPEC CULT: NORMAL
SERVICE CMNT-IMP: NORMAL

## 2023-09-19 RX ORDER — DEXTROMETHORPHAN HYDROBROMIDE, GUAIFENESIN 20; 400 MG/20ML; MG/20ML
SOLUTION ORAL
COMMUNITY

## 2023-09-19 NOTE — PROGRESS NOTES
Chief Complaint   Patient presents with    Follow-Up from Hospital     Covid positive & pneumonia       1. Have you been to the ER, urgent care clinic since your last visit? Hospitalized since your last visit? Yes Hospital    2. Have you seen or consulted any other health care providers outside of the [address] [place] [address] Avenue since your last visit? Include any pap smears or colon screening.  No
Automatic Reconciliation   potassium chloride (MICRO-K) 10 MEQ extended release capsule TAKE 2 CAPSULES DAILY Yes Ar Automatic Reconciliation   RABEprazole (ACIPHEX) 20 MG tablet Take 1 tablet by mouth daily Yes Ar Automatic Reconciliation   tolterodine (DETROL LA) 4 MG extended release capsule ceived the following from [address] [facility]: Outside name: tolterodine ER (Detrol LA) 4 mg ER capsule Yes Ar Automatic Reconciliation       Allergies   Allergen Reactions    Adhesive Tape      Patient states not allergic   ,   Past Medical History:   Diagnosis Date    Chronic pain     Coronary artery calcification 12/2017    CT Finding    Gastroesophageal reflux disease with hiatal hernia 8/23/2017    History of gallstones 08/23/2017    History of right breast biopsy 05/2013    History of tinea corporis 08/23/2017    Hyperlipidemia 08/23/2017    RX = simvastatin    Hypertension     RX = Lisinopril/HCTZ    Hypokalemia 8/23/2017    Left thyroid nodule 8/23/2017    Lower extremity edema 08/23/2017    Lumbar disc disease 8/23/2017    Menopause 8/23/2017    Morbid obesity ([address]) 8/23/2017    Osteoarthritis, multiple sites     knee, hip    Rhinitis, allergic 8/23/2017    Urge incontinence 08/23/2017    RX = Myrbetrqi    Vitamin D deficiency 8/23/2017   ,   Past Surgical History:   Procedure Laterality Date    CATARACT REMOVAL Right     COLONOSCOPY N/A 2/9/2018    COLONOSCOPY performed by Nathan Barone MD at [facility]  11/12/2014         HEENT      [facility]   2004    ALEX KNEE REPLACEMENT    ME UNLISTED PROCEDURE ABDOMEN PERITONEUM & OMENTUM      HERNIA    TONSILLECTOMY  AS CHILD    TUBAL LIGATION         PHYSICAL EXAMINATION:    She couldn't get her camera to work. We only had audio visit. Her speech was normal. No gasping for air, doesn't sound SOB.      Other pertinent observable physical exam findings-     ASSESSMENT/PLAN:  Coral Sandoval was seen today for follow-up

## 2023-09-27 ENCOUNTER — OFFICE VISIT (OUTPATIENT)
Age: 68
End: 2023-09-27
Payer: MEDICARE

## 2023-09-27 VITALS
WEIGHT: 293 LBS | BODY MASS INDEX: 48.82 KG/M2 | DIASTOLIC BLOOD PRESSURE: 83 MMHG | HEART RATE: 82 BPM | OXYGEN SATURATION: 100 % | RESPIRATION RATE: 20 BRPM | TEMPERATURE: 97.5 F | HEIGHT: 65 IN | SYSTOLIC BLOOD PRESSURE: 148 MMHG

## 2023-09-27 DIAGNOSIS — E55.9 VITAMIN D DEFICIENCY: ICD-10-CM

## 2023-09-27 DIAGNOSIS — Z23 NEED FOR VACCINATION: ICD-10-CM

## 2023-09-27 DIAGNOSIS — E78.5 HYPERLIPIDEMIA, UNSPECIFIED HYPERLIPIDEMIA TYPE: ICD-10-CM

## 2023-09-27 DIAGNOSIS — Z71.3 DIETARY COUNSELING: ICD-10-CM

## 2023-09-27 DIAGNOSIS — E66.1 CLASS 3 DRUG-INDUCED OBESITY WITHOUT SERIOUS COMORBIDITY WITH BODY MASS INDEX (BMI) OF 60.0 TO 69.9 IN ADULT (HCC): ICD-10-CM

## 2023-09-27 DIAGNOSIS — I10 PRIMARY HYPERTENSION: Primary | ICD-10-CM

## 2023-09-27 PROBLEM — E66.813 CLASS 3 DRUG-INDUCED OBESITY WITHOUT SERIOUS COMORBIDITY WITH BODY MASS INDEX (BMI) OF 60.0 TO 69.9 IN ADULT: Status: ACTIVE | Noted: 2023-09-27

## 2023-09-27 PROCEDURE — 90694 VACC AIIV4 NO PRSRV 0.5ML IM: CPT | Performed by: FAMILY MEDICINE

## 2023-09-27 PROCEDURE — 99213 OFFICE O/P EST LOW 20 MIN: CPT | Performed by: FAMILY MEDICINE

## 2023-09-27 RX ORDER — AMLODIPINE BESYLATE 5 MG/1
5 TABLET ORAL DAILY
Qty: 30 TABLET | Refills: 1 | Status: SHIPPED | OUTPATIENT
Start: 2023-09-27

## 2023-09-27 RX ORDER — ERGOCALCIFEROL 1.25 MG/1
50000 CAPSULE ORAL WEEKLY
Qty: 12 CAPSULE | Refills: 1 | Status: SHIPPED | OUTPATIENT
Start: 2023-09-27

## 2023-09-27 NOTE — PROGRESS NOTES
Chief Complaint   Patient presents with    Follow-up Chronic Condition     3 mo check    1. Have you been to the ER, urgent care clinic since your last visit? Hospitalized since your last visit? No    2. Have you seen or consulted any other health care providers outside of the [address] [place] [address] Avenue since your last visit? Include any pap smears or colon screening. No      Order placed for Fluad from provider Dr. Yovany Bess, verified by Verbal Order Read Back with provider. Observed for 15 min, no reaction.

## 2023-09-27 NOTE — PROGRESS NOTES
Garr Burkitt is a 70 y.o. female     has a past medical history of Chronic pain, Coronary artery calcification, Gastroesophageal reflux disease with hiatal hernia, History of gallstones, History of right breast biopsy, History of tinea corporis, Hyperlipidemia, Hypertension, Hypokalemia, Left thyroid nodule, Lower extremity edema, Lumbar disc disease, Menopause, Morbid obesity ([address]), Osteoarthritis, multiple sites, Rhinitis, allergic, Urge incontinence, and Vitamin D deficiency. Assessment/Plans:    Jaswant Mirza was seen today for follow-up chronic condition. 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 Through the use of shared decision making, above plans were agreed upon. Medication compliance advised. Patient verbalized understanding. Return in about 3 months (around 12/27/2023) for HTN, HLD, weight loss meds. Subjective:    Report negative COVID test recently    HTN: BP not at goal today. Lisinopril/hctz 10-25 makes her urinate d/c lisin/hctz  Metoprolol XL 25mg   Add amlodipine 5mg     HLD: tolerating lipitor 40mg     BMI 72, 409 lbs. We discussed diet  Drinks 2 16oz of bottles of pepsi a day  Brkf rarely eat. - wafles, grit or coffee  Usually 1 large meal at 3pm               Steak, or chicken, or pork chop and mash potatoes. Greens. Eat out a lot. Before bed some type of sweet, honey buns. Snacks on kevin   Topamax 25mg BID she haven't start yet b/c was afraid of warning.  To start     Vit d def needs 5000u daily     Urology

## 2023-12-01 DIAGNOSIS — I10 PRIMARY HYPERTENSION: ICD-10-CM

## 2023-12-01 NOTE — TELEPHONE ENCOUNTER
Norvasc was sent on 9/27/23 for #90 with 1 refill - too soon.    Last appointment: 9/27/23  Next appointment: 1/2/24  Previous refill encounter(s): 9/2/22 [facility]    Requested Prescriptions     Pending Prescriptions Disp Refills    potassium chloride (MICRO-K) 10 MEQ extended release capsule [Pharmacy Med Name: POTASSIUM CHLORIDE ER CAPS 10MEQ] 180 capsule 0     Sig: TAKE 2 CAPSULES DAILY    amLODIPine (NORVASC) 5 MG tablet [Pharmacy Med Name: AMLODIPINE BESYLATE TABS 5MG] 30 tablet 11     Sig: TAKE 1 TABLET DAILY         For Pharmacy Admin Tracking Only    Program: Medication Refill  CPA in place:    Recommendation Provided To:   Intervention Detail: New Rx: 2, reason: Patient Preference  Intervention Accepted By:   Gap Closed?:    Time Spent (min): 5

## 2023-12-04 RX ORDER — POTASSIUM CHLORIDE 750 MG/1
CAPSULE, EXTENDED RELEASE ORAL
Qty: 180 CAPSULE | Refills: 0 | Status: SHIPPED | OUTPATIENT
Start: 2023-12-04 | End: 2024-02-01 | Stop reason: SDUPTHER

## 2023-12-04 RX ORDER — AMLODIPINE BESYLATE 5 MG/1
5 TABLET ORAL DAILY
Qty: 90 TABLET | Refills: 0 | Status: SHIPPED | OUTPATIENT
Start: 2023-12-04 | End: 2024-02-01 | Stop reason: SDUPTHER

## 2024-01-25 DIAGNOSIS — I10 PRIMARY HYPERTENSION: ICD-10-CM

## 2024-01-25 RX ORDER — METOPROLOL SUCCINATE 25 MG/1
25 TABLET, EXTENDED RELEASE ORAL DAILY
Qty: 90 TABLET | Refills: 0 | Status: SHIPPED | OUTPATIENT
Start: 2024-01-25

## 2024-01-25 NOTE — TELEPHONE ENCOUNTER
Last appointment: 9/27/23  Next appointment: 2/1/24  Previous refill encounter(s): 5/31/23 #90 with 1 refill    Requested Prescriptions     Pending Prescriptions Disp Refills    metoprolol succinate (TOPROL XL) 25 MG extended release tablet [Pharmacy Med Name: METOPROLOL ER SUCCINATE 25MG TABS] 90 tablet 1     Sig: TAKE 1 TABLET BY MOUTH DAILY         For Pharmacy Admin Tracking Only    Program: Medication Refill  CPA in place:    Recommendation Provided To:   Intervention Detail: New Rx: 1, reason: Patient Preference  Intervention Accepted By:   Gap Closed?:    Time Spent (min): 5

## 2024-02-01 ENCOUNTER — TELEMEDICINE (OUTPATIENT)
Age: 69
End: 2024-02-01
Payer: MEDICARE

## 2024-02-01 DIAGNOSIS — I10 PRIMARY HYPERTENSION: Primary | ICD-10-CM

## 2024-02-01 DIAGNOSIS — E55.9 VITAMIN D DEFICIENCY: ICD-10-CM

## 2024-02-01 DIAGNOSIS — M54.16 LUMBAR RADICULOPATHY, RIGHT: ICD-10-CM

## 2024-02-01 DIAGNOSIS — Z79.899 ENCOUNTER FOR LONG-TERM (CURRENT) USE OF MEDICATIONS: ICD-10-CM

## 2024-02-01 DIAGNOSIS — K21.9 GASTROESOPHAGEAL REFLUX DISEASE, UNSPECIFIED WHETHER ESOPHAGITIS PRESENT: ICD-10-CM

## 2024-02-01 DIAGNOSIS — E78.5 HYPERLIPIDEMIA, UNSPECIFIED HYPERLIPIDEMIA TYPE: ICD-10-CM

## 2024-02-01 DIAGNOSIS — E87.6 HYPOKALEMIA: ICD-10-CM

## 2024-02-01 PROCEDURE — 1036F TOBACCO NON-USER: CPT | Performed by: FAMILY MEDICINE

## 2024-02-01 PROCEDURE — 99214 OFFICE O/P EST MOD 30 MIN: CPT | Performed by: FAMILY MEDICINE

## 2024-02-01 PROCEDURE — 3017F COLORECTAL CA SCREEN DOC REV: CPT | Performed by: FAMILY MEDICINE

## 2024-02-01 PROCEDURE — 1090F PRES/ABSN URINE INCON ASSESS: CPT | Performed by: FAMILY MEDICINE

## 2024-02-01 PROCEDURE — G8400 PT W/DXA NO RESULTS DOC: HCPCS | Performed by: FAMILY MEDICINE

## 2024-02-01 PROCEDURE — G8427 DOCREV CUR MEDS BY ELIG CLIN: HCPCS | Performed by: FAMILY MEDICINE

## 2024-02-01 PROCEDURE — G8484 FLU IMMUNIZE NO ADMIN: HCPCS | Performed by: FAMILY MEDICINE

## 2024-02-01 PROCEDURE — G8417 CALC BMI ABV UP PARAM F/U: HCPCS | Performed by: FAMILY MEDICINE

## 2024-02-01 PROCEDURE — 1124F ACP DISCUSS-NO DSCNMKR DOCD: CPT | Performed by: FAMILY MEDICINE

## 2024-02-01 RX ORDER — BENZONATATE 100 MG/1
CAPSULE ORAL
COMMUNITY
Start: 2024-01-26

## 2024-02-01 RX ORDER — ERGOCALCIFEROL 1.25 MG/1
50000 CAPSULE ORAL WEEKLY
Qty: 12 CAPSULE | Refills: 1 | Status: SHIPPED | OUTPATIENT
Start: 2024-02-01

## 2024-02-01 RX ORDER — OMEPRAZOLE 20 MG/1
20 CAPSULE, DELAYED RELEASE ORAL DAILY PRN
Qty: 90 CAPSULE | Refills: 1 | Status: SHIPPED | OUTPATIENT
Start: 2024-02-01

## 2024-02-01 RX ORDER — METOPROLOL SUCCINATE 25 MG/1
25 TABLET, EXTENDED RELEASE ORAL DAILY
Qty: 90 TABLET | Refills: 0 | Status: SHIPPED | OUTPATIENT
Start: 2024-02-01

## 2024-02-01 RX ORDER — MELOXICAM 15 MG/1
15 TABLET ORAL DAILY PRN
Qty: 30 TABLET | Refills: 1 | Status: SHIPPED | OUTPATIENT
Start: 2024-02-01 | End: 2024-04-01

## 2024-02-01 RX ORDER — AMLODIPINE BESYLATE 5 MG/1
5 TABLET ORAL DAILY
Qty: 90 TABLET | Refills: 0 | Status: SHIPPED | OUTPATIENT
Start: 2024-02-01

## 2024-02-01 RX ORDER — ATORVASTATIN CALCIUM 40 MG/1
40 TABLET, FILM COATED ORAL DAILY
Qty: 90 TABLET | Refills: 1 | Status: SHIPPED | OUTPATIENT
Start: 2024-02-01 | End: 2025-01-29

## 2024-02-01 RX ORDER — POTASSIUM CHLORIDE 750 MG/1
CAPSULE, EXTENDED RELEASE ORAL
Qty: 180 CAPSULE | Refills: 1 | Status: SHIPPED | OUTPATIENT
Start: 2024-02-01

## 2024-02-01 ASSESSMENT — PATIENT HEALTH QUESTIONNAIRE - PHQ9
2. FEELING DOWN, DEPRESSED OR HOPELESS: 0
SUM OF ALL RESPONSES TO PHQ QUESTIONS 1-9: 0
SUM OF ALL RESPONSES TO PHQ9 QUESTIONS 1 & 2: 0
1. LITTLE INTEREST OR PLEASURE IN DOING THINGS: 0

## 2024-02-01 NOTE — PROGRESS NOTES
Chief Complaint   Patient presents with    Hypertension    Cholesterol Problem     3 mo check    1. Have you been to the ER, urgent care clinic since your last visit?  Hospitalized since your last visit?[facility]    2. Have you seen or consulted any other health care providers outside of the [facility] since your last visit?  Include any pap smears or colon screening. Yes

## 2024-02-01 NOTE — PROGRESS NOTES
2024    TELEHEALTH EVALUATION -- Audio/Visual    HPI:    Gabriella Arroyo (:  1955) has requested an audio/video evaluation for the following concern(s):     has a past medical history of Chronic pain, Coronary artery calcification, Gastroesophageal reflux disease with hiatal hernia, History of gallstones, History of right breast biopsy, History of tinea corporis, Hyperlipidemia, Hypertension, Hypokalemia, Left thyroid nodule, Lower extremity edema, Lumbar disc disease, Menopause, Morbid obesity (HCC), Osteoarthritis, multiple sites, Rhinitis, allergic, Urge incontinence, and Vitamin D deficiency.    HTN:   Lisinopril/hctz 10-25 makes her urinate d/c lisin/hctz  Metoprolol XL 25mg   amlodipine 5mg     HLD: tolerating lipitor 40mg     BMI 72, 409 lbs.   We discussed diet  Drinks 2 16oz of bottles of pepsi a day  Brkf rarely eat. - wafles, grit or coffee  Usually 1 large meal at 3pm               Steak, or chicken, or pork chop and mash potatoes. Greens.  Eats out a lot.   Before bed some type of sweet, honey buns.  Snacks on kevin   Topamax 25mg BID she haven't start yet b/c was afraid of warning. To start     Vit d def needs 5000u daily     Urology Dr. Vo for urinary incontinent  Myrbetriq, detrol     [facility]N: at [facility]     Dexa scan was ordered but the machine was not big enough for her.        Review of Systems    Prior to Visit Medications    Medication Sig Taking? Authorizing Provider   benzonatate (TESSALON) 100 MG capsule  Yes Provider, MD Lonnie   amLODIPine (NORVASC) 5 MG tablet Take 1 tablet by mouth daily Yes Justine Shepherd MD   atorvastatin (LIPITOR) 40 MG tablet Take 1 tablet by mouth daily Yes Justine Shepherd MD   metoprolol succinate (TOPROL XL) 25 MG extended release tablet Take 1 tablet by mouth daily Yes Justine Shepherd MD   potassium chloride (MICRO-K) 10 MEQ extended release capsule TAKE 2 CAPSULES DAILY Yes Justine Shepherd MD   vitamin D (ERGOCALCIFEROL) 1.25 MG

## 2024-02-06 ENCOUNTER — TELEPHONE (OUTPATIENT)
Age: 69
End: 2024-02-06

## 2024-02-06 NOTE — TELEPHONE ENCOUNTER
Pt received a medication by mail Omeprazole 20 mg and does not know what it is and why she received it.    Pt is concerned about the new medication, if it was a mistake    Pt was not aware of the change    PT # [phone]

## 2024-02-06 NOTE — TELEPHONE ENCOUNTER
Spoke with patient. Dr. Shepherd ordered this for GERD. It takes the place of Aciphex. Patient verbalized understanding.

## 2024-03-26 ENCOUNTER — OFFICE VISIT (OUTPATIENT)
Age: 69
End: 2024-03-26
Payer: MEDICARE

## 2024-03-26 VITALS
BODY MASS INDEX: 48.82 KG/M2 | WEIGHT: 293 LBS | DIASTOLIC BLOOD PRESSURE: 76 MMHG | RESPIRATION RATE: 18 BRPM | OXYGEN SATURATION: 100 % | SYSTOLIC BLOOD PRESSURE: 145 MMHG | HEART RATE: 93 BPM | TEMPERATURE: 96.3 F | HEIGHT: 65 IN

## 2024-03-26 DIAGNOSIS — E55.9 VITAMIN D DEFICIENCY: ICD-10-CM

## 2024-03-26 DIAGNOSIS — Z79.899 ENCOUNTER FOR LONG-TERM (CURRENT) USE OF MEDICATIONS: ICD-10-CM

## 2024-03-26 DIAGNOSIS — Z78.0 POST-MENOPAUSAL: ICD-10-CM

## 2024-03-26 DIAGNOSIS — Z13.820 SCREENING FOR OSTEOPOROSIS: ICD-10-CM

## 2024-03-26 DIAGNOSIS — I10 PRIMARY HYPERTENSION: Primary | ICD-10-CM

## 2024-03-26 DIAGNOSIS — E87.6 HYPOKALEMIA: ICD-10-CM

## 2024-03-26 DIAGNOSIS — E78.5 HYPERLIPIDEMIA, UNSPECIFIED HYPERLIPIDEMIA TYPE: ICD-10-CM

## 2024-03-26 PROCEDURE — G8484 FLU IMMUNIZE NO ADMIN: HCPCS | Performed by: FAMILY MEDICINE

## 2024-03-26 PROCEDURE — 1124F ACP DISCUSS-NO DSCNMKR DOCD: CPT | Performed by: FAMILY MEDICINE

## 2024-03-26 PROCEDURE — G8417 CALC BMI ABV UP PARAM F/U: HCPCS | Performed by: FAMILY MEDICINE

## 2024-03-26 PROCEDURE — 1036F TOBACCO NON-USER: CPT | Performed by: FAMILY MEDICINE

## 2024-03-26 PROCEDURE — 99214 OFFICE O/P EST MOD 30 MIN: CPT | Performed by: FAMILY MEDICINE

## 2024-03-26 PROCEDURE — 3017F COLORECTAL CA SCREEN DOC REV: CPT | Performed by: FAMILY MEDICINE

## 2024-03-26 PROCEDURE — 3078F DIAST BP <80 MM HG: CPT | Performed by: FAMILY MEDICINE

## 2024-03-26 PROCEDURE — G8427 DOCREV CUR MEDS BY ELIG CLIN: HCPCS | Performed by: FAMILY MEDICINE

## 2024-03-26 PROCEDURE — 1090F PRES/ABSN URINE INCON ASSESS: CPT | Performed by: FAMILY MEDICINE

## 2024-03-26 PROCEDURE — 3077F SYST BP >= 140 MM HG: CPT | Performed by: FAMILY MEDICINE

## 2024-03-26 PROCEDURE — G8400 PT W/DXA NO RESULTS DOC: HCPCS | Performed by: FAMILY MEDICINE

## 2024-03-26 RX ORDER — METOPROLOL SUCCINATE 50 MG/1
50 TABLET, EXTENDED RELEASE ORAL DAILY
Qty: 90 TABLET | Refills: 1 | Status: SHIPPED | OUTPATIENT
Start: 2024-03-26

## 2024-03-26 RX ORDER — AMLODIPINE BESYLATE 5 MG/1
5 TABLET ORAL DAILY
Qty: 90 TABLET | Refills: 1 | Status: SHIPPED | OUTPATIENT
Start: 2024-03-26

## 2024-03-26 RX ORDER — ERGOCALCIFEROL 1.25 MG/1
50000 CAPSULE ORAL WEEKLY
Qty: 12 CAPSULE | Refills: 1 | Status: SHIPPED | OUTPATIENT
Start: 2024-03-26

## 2024-03-26 RX ORDER — ATORVASTATIN CALCIUM 40 MG/1
40 TABLET, FILM COATED ORAL DAILY
Qty: 90 TABLET | Refills: 1 | Status: SHIPPED | OUTPATIENT
Start: 2024-03-26 | End: 2025-03-24

## 2024-03-26 RX ORDER — MIRABEGRON 50 MG/1
TABLET, FILM COATED, EXTENDED RELEASE ORAL
COMMUNITY
Start: 2024-02-28

## 2024-03-26 RX ORDER — POTASSIUM CHLORIDE 750 MG/1
CAPSULE, EXTENDED RELEASE ORAL
Qty: 180 CAPSULE | Refills: 1 | Status: SHIPPED | OUTPATIENT
Start: 2024-03-26

## 2024-03-26 NOTE — PROGRESS NOTES
Chief Complaint   Patient presents with    Follow-up Chronic Condition     Check meds    1. Have you been to the ER, urgent care clinic since your last visit?  Hospitalized since your last visit?[facility]    2. Have you seen or consulted any other health care providers outside of the [facility] since your last visit?  Include any pap smears or colon screening. Yes

## 2024-03-26 NOTE — PROGRESS NOTES
Gabriella Arroyo is a 69 y.o. female      Assessment/Plans:    Gabriella was seen today for follow-up chronic condition.    Diagnoses and all orders for this visit:    Primary hypertension  -     amLODIPine (NORVASC) 5 MG tablet; Take 1 tablet by mouth daily  -     metoprolol succinate (TOPROL XL) 50 MG extended release tablet; Take 1 tablet by mouth daily  -     Comprehensive Metabolic Panel; Future    Hyperlipidemia, unspecified hyperlipidemia type  -     atorvastatin (LIPITOR) 40 MG tablet; Take 1 tablet by mouth daily  -     Comprehensive Metabolic Panel; Future    Hypokalemia  -     potassium chloride (MICRO-K) 10 MEQ extended release capsule; TAKE 2 CAPSULES DAILY  -     Comprehensive Metabolic Panel; Future    Vitamin D deficiency  -     vitamin D (ERGOCALCIFEROL) 1.25 MG (85625 UT) CAPS capsule; Take 1 capsule by mouth once a week    Screening for osteoporosis  -     DEXA BONE DENSITY AXIAL SKELETON; Future  -     DEXA BONE DENSITY PERIPHERAL; Future    Post-menopausal  -     DEXA BONE DENSITY AXIAL SKELETON; Future  -     DEXA BONE DENSITY PERIPHERAL; Future    Encounter for long-term (current) use of medications  -     Comprehensive Metabolic Panel; Future      Discussed plans, risk/benefits of treatments/observations.   Through the use of shared decision making, above plans were agreed upon.   Medication compliance advised.  Patient verbalized understanding.     Return in about 6 months (around 9/11/2024) for AWV, chronic medical issues.      Subjective:     has a past medical history of Chronic pain, Coronary artery calcification, Gastroesophageal reflux disease with hiatal hernia, History of gallstones, History of right breast biopsy, History of tinea corporis, Hyperlipidemia, Hypertension, Hypokalemia, Left thyroid nodule, Lower extremity edema, Lumbar disc disease, Menopause, Morbid obesity (HCC), Osteoarthritis, multiple sites, Rhinitis, allergic, Urge incontinence, and Vitamin D deficiency.    HTN: Not at

## 2024-04-05 ENCOUNTER — HOSPITAL ENCOUNTER (OUTPATIENT)
Facility: HOSPITAL | Age: 69
End: 2024-04-05
Attending: FAMILY MEDICINE
Payer: MEDICARE

## 2024-04-05 DIAGNOSIS — Z13.820 SCREENING FOR OSTEOPOROSIS: ICD-10-CM

## 2024-04-05 DIAGNOSIS — Z78.0 POST-MENOPAUSAL: ICD-10-CM

## 2024-04-05 PROCEDURE — 77081 DXA BONE DENSITY APPENDICULR: CPT

## 2024-04-22 DIAGNOSIS — I10 PRIMARY HYPERTENSION: ICD-10-CM

## 2024-04-22 RX ORDER — METOPROLOL SUCCINATE 25 MG/1
25 TABLET, EXTENDED RELEASE ORAL DAILY
Qty: 90 TABLET | Refills: 0 | OUTPATIENT
Start: 2024-04-22

## 2024-06-14 ENCOUNTER — TRANSCRIBE ORDERS (OUTPATIENT)
Facility: HOSPITAL | Age: 69
End: 2024-06-14

## 2024-06-14 ENCOUNTER — HOSPITAL ENCOUNTER (OUTPATIENT)
Facility: HOSPITAL | Age: 69
End: 2024-06-14
Payer: MEDICARE

## 2024-06-14 DIAGNOSIS — M54.16 LUMBAR RADICULOPATHY: Primary | ICD-10-CM

## 2024-06-14 DIAGNOSIS — D17.24 LIPOMA OF LEFT THIGH: ICD-10-CM

## 2024-06-14 PROCEDURE — 76882 US LMTD JT/FCL EVL NVASC XTR: CPT

## 2024-09-26 ENCOUNTER — HOSPITAL ENCOUNTER (OUTPATIENT)
Facility: HOSPITAL | Age: 69
Discharge: HOME | End: 2024-09-29
Attending: ORTHOPAEDIC SURGERY

## 2024-09-26 DIAGNOSIS — D17.24 LIPOMA OF LEFT THIGH: ICD-10-CM

## 2024-09-26 DIAGNOSIS — M54.16 LUMBAR RADICULOPATHY, RIGHT: ICD-10-CM

## 2024-10-03 ENCOUNTER — HOSPITAL ENCOUNTER (OUTPATIENT)
Age: 69
Discharge: HOME | End: 2024-10-06
Attending: ORTHOPAEDIC SURGERY
Payer: MEDICARE

## 2024-10-03 PROCEDURE — 72148 MRI LUMBAR SPINE W/O DYE: CPT

## 2025-06-07 NOTE — TELEPHONE ENCOUNTER
PCP: Mehran Talamantes MD    Last appt: 4/1/2022  Future Appointments   Date Time Provider Brennon Duke   7/1/2022  1:15 PM Mehran Talamantes MD [facility]       Requested Prescriptions     Pending Prescriptions Disp Refills    RABEprazole (ACIPHEX) 20 mg TbEC 60 Tablet 0     Sig: TAKE 1 TABLET BY MOUTH EVERY DAY  Indications: gastroesophageal reflux disease, heartburn    atorvastatin (LIPITOR) 40 mg tablet 30 Tablet 11     Sig: Take 1 Tablet by mouth daily for 360 days.        Prior labs and Blood pressures:  BP Readings from Last 3 Encounters:   04/01/22 136/84   12/07/21 130/78   01/12/21 132/76     Lab Results   Component Value Date/Time    Sodium 139 04/01/2022 11:04 AM    Potassium 4.1 04/01/2022 11:04 AM    Chloride 105 04/01/2022 11:04 AM    CO2 30 04/01/2022 11:04 AM    Anion gap 4 (L) 04/01/2022 11:04 AM    Glucose 86 04/01/2022 11:04 AM    BUN 16 04/01/2022 11:04 AM    Creatinine 0.71 04/01/2022 11:04 AM    BUN/Creatinine ratio 23 (H) 04/01/2022 11:04 AM    GFR est AA >60 04/01/2022 11:04 AM    GFR est non-AA >60 04/01/2022 11:04 AM    Calcium 9.6 04/01/2022 11:04 AM     Lab Results   Component Value Date/Time    Hemoglobin A1c 5.2 12/07/2021 12:27 PM     Lab Results   Component Value Date/Time    Cholesterol, total 182 12/07/2021 12:27 PM    HDL Cholesterol 53 12/07/2021 12:27 PM    LDL, calculated 115.8 (H) 12/07/2021 12:27 PM    VLDL, calculated 13.2 12/07/2021 12:27 PM    Triglyceride 66 12/07/2021 12:27 PM    CHOL/HDL Ratio 3.4 12/07/2021 12:27 PM     Lab Results   Component Value Date/Time    Vitamin D 25-Hydroxy 18.7 (L) 04/01/2022 11:04 AM       Lab Results   Component Value Date/Time    TSH 2.76 12/07/2021 12:27 PM    TSH, 3rd generation 3.33 11/24/2020 11:22 AM Initial (On Arrival)

## (undated) DEVICE — CONTAINER SPEC 20 ML LID NEUT BUFF FORMALIN 10 % POLYPR STS

## (undated) DEVICE — BAG SPEC BIOHZRD 10 X 10 IN --

## (undated) DEVICE — SET ADMIN 16ML TBNG L100IN 2 Y INJ SITE IV PIGGY BK DISP

## (undated) DEVICE — FORCEPS BX L160CM DIA8MM GRSP DISECT CUP TIP NONLOCKING ROT

## (undated) DEVICE — Z DISCONTINUED PER MEDLINE LINE GAS SAMPLING O2/CO2 LNG AD 13 FT NSL W/ TBNG FILTERLINE

## (undated) DEVICE — Device

## (undated) DEVICE — 1200 GUARD II KIT W/5MM TUBE W/O VAC TUBE: Brand: GUARDIAN

## (undated) DEVICE — CATH IV AUTOGRD BC PNK 20GA 25 -- INSYTE

## (undated) DEVICE — TOWEL 4 PLY TISS 19X30 SUE WHT

## (undated) DEVICE — SYR 3ML LL TIP 1/10ML GRAD --

## (undated) DEVICE — SYR 10ML LUER LOK 1/5ML GRAD --

## (undated) DEVICE — SOLIDIFIER MEDC 1200ML -- CONVERT TO 356117

## (undated) DEVICE — BLOCK BITE ENDOSCP AD 21 MM W/ DIL BLU LF DISP

## (undated) DEVICE — NEONATAL-ADULT SPO2 SENSOR: Brand: NELLCOR

## (undated) DEVICE — NEEDLE HYPO 18GA L1.5IN PNK S STL HUB POLYPR SHLD REG BVL

## (undated) DEVICE — KENDALL RADIOLUCENT FOAM MONITORING ELECTRODE RECTANGULAR SHAPE: Brand: KENDALL

## (undated) DEVICE — BASIN EMSIS 16OZ GRAPHITE PLAS KID SHP MOLD GRAD FOR ORAL

## (undated) DEVICE — MEDI-VAC YANK SUCT HNDL W/TPRD BULBOUS TIP: Brand: CARDINAL HEALTH